# Patient Record
Sex: FEMALE | Race: WHITE | NOT HISPANIC OR LATINO | Employment: FULL TIME | URBAN - METROPOLITAN AREA
[De-identification: names, ages, dates, MRNs, and addresses within clinical notes are randomized per-mention and may not be internally consistent; named-entity substitution may affect disease eponyms.]

---

## 2023-02-11 PROBLEM — K21.9 REFLUX LARYNGITIS: Status: ACTIVE | Noted: 2023-02-11

## 2023-02-11 PROBLEM — R39.9 UTI SYMPTOMS: Status: ACTIVE | Noted: 2023-02-11

## 2023-02-11 PROBLEM — R92.1 CALCIFICATION OF LEFT BREAST ON MAMMOGRAPHY: Status: ACTIVE | Noted: 2023-02-11

## 2023-02-11 PROBLEM — N39.41 URGE INCONTINENCE: Status: ACTIVE | Noted: 2023-02-11

## 2023-02-11 PROBLEM — E55.9 VITAMIN D DEFICIENCY: Status: ACTIVE | Noted: 2023-02-11

## 2023-02-11 PROBLEM — R06.83 SNORING: Status: ACTIVE | Noted: 2023-02-11

## 2023-02-11 PROBLEM — I51.9 CARDIAC DISEASE: Status: ACTIVE | Noted: 2023-02-11

## 2023-02-11 PROBLEM — D22.9 SKIN MOLE: Status: ACTIVE | Noted: 2023-02-11

## 2023-02-11 PROBLEM — R93.1 ELEVATED CORONARY ARTERY CALCIUM SCORE: Status: ACTIVE | Noted: 2023-02-11

## 2023-02-11 PROBLEM — J34.89 NASAL DRYNESS: Status: ACTIVE | Noted: 2023-02-11

## 2023-02-11 PROBLEM — N20.0 RECURRENT NEPHROLITHIASIS: Status: ACTIVE | Noted: 2023-02-11

## 2023-02-11 PROBLEM — G47.33 OSA (OBSTRUCTIVE SLEEP APNEA): Status: ACTIVE | Noted: 2023-02-11

## 2023-02-11 PROBLEM — N64.4 BREAST PAIN: Status: ACTIVE | Noted: 2023-02-11

## 2023-02-11 PROBLEM — E66.811 CLASS 1 OBESITY DUE TO EXCESS CALORIES WITH BODY MASS INDEX (BMI) OF 31.0 TO 31.9 IN ADULT: Status: ACTIVE | Noted: 2023-02-11

## 2023-02-11 PROBLEM — N90.89 LABIAL LESION: Status: ACTIVE | Noted: 2023-02-11

## 2023-02-11 PROBLEM — E66.09 CLASS 1 OBESITY DUE TO EXCESS CALORIES WITH BODY MASS INDEX (BMI) OF 31.0 TO 31.9 IN ADULT: Status: ACTIVE | Noted: 2023-02-11

## 2023-02-11 PROBLEM — N64.89 BREAST HEMATOMA, RESOLVING: Status: ACTIVE | Noted: 2023-02-11

## 2023-02-11 PROBLEM — N95.2 ATROPHIC VAGINITIS: Status: ACTIVE | Noted: 2023-02-11

## 2023-02-11 PROBLEM — R92.8 ABNORMAL MAMMOGRAM OF BOTH BREASTS: Status: ACTIVE | Noted: 2023-02-11

## 2023-02-11 PROBLEM — J38.1 VOCAL CORD POLYP: Status: ACTIVE | Noted: 2023-02-11

## 2023-02-11 PROBLEM — R10.9 ABDOMINAL PAIN: Status: ACTIVE | Noted: 2023-02-11

## 2023-02-11 PROBLEM — E78.9 LIPID DISORDER: Status: ACTIVE | Noted: 2023-02-11

## 2023-02-11 PROBLEM — N81.89 PELVIC FLOOR WEAKNESS: Status: ACTIVE | Noted: 2023-02-11

## 2023-02-11 PROBLEM — R09.89 CHRONIC THROAT CLEARING: Status: ACTIVE | Noted: 2023-02-11

## 2023-02-11 PROBLEM — E66.9 OBESITY: Status: ACTIVE | Noted: 2023-02-11

## 2023-02-11 PROBLEM — E03.9 HYPOTHYROID: Status: ACTIVE | Noted: 2023-02-11

## 2023-02-11 PROBLEM — I42.2 ASYMMETRIC SEPTAL HYPERTROPHY (MULTI): Status: ACTIVE | Noted: 2023-02-11

## 2023-02-11 PROBLEM — E78.5 HYPERLIPIDEMIA: Status: ACTIVE | Noted: 2023-02-11

## 2023-02-11 PROBLEM — M54.59 MECHANICAL LOW BACK PAIN: Status: ACTIVE | Noted: 2023-02-11

## 2023-02-11 PROBLEM — I51.7 ASYMMETRIC SEPTAL HYPERTROPHY: Status: ACTIVE | Noted: 2023-02-11

## 2023-02-11 PROBLEM — M54.12 CERVICAL NEURITIS: Status: ACTIVE | Noted: 2023-02-11

## 2023-02-11 PROBLEM — R10.2 FEMALE PELVIC PAIN: Status: ACTIVE | Noted: 2023-02-11

## 2023-02-11 PROBLEM — R07.89 CHEST WALL PAIN: Status: ACTIVE | Noted: 2023-02-11

## 2023-02-11 PROBLEM — K76.9 LIVER LESION: Status: ACTIVE | Noted: 2023-02-11

## 2023-02-11 PROBLEM — F41.9 ANXIETY: Status: ACTIVE | Noted: 2023-02-11

## 2023-02-11 PROBLEM — N83.209 OVARIAN CYST: Status: ACTIVE | Noted: 2023-02-11

## 2023-02-11 PROBLEM — R35.0 URINARY FREQUENCY: Status: ACTIVE | Noted: 2023-02-11

## 2023-02-11 PROBLEM — R01.1 MURMUR: Status: ACTIVE | Noted: 2023-02-11

## 2023-02-11 PROBLEM — J04.0 REFLUX LARYNGITIS: Status: ACTIVE | Noted: 2023-02-11

## 2023-02-11 PROBLEM — N20.0 KIDNEY STONE: Status: ACTIVE | Noted: 2023-02-11

## 2023-02-11 RX ORDER — VIT C/E/ZN/COPPR/LUTEIN/ZEAXAN 250MG-90MG
CAPSULE ORAL
COMMUNITY

## 2023-02-11 RX ORDER — OXYBUTYNIN CHLORIDE 10 MG/1
1 TABLET, EXTENDED RELEASE ORAL DAILY
COMMUNITY
End: 2023-11-10

## 2023-02-11 RX ORDER — ROSUVASTATIN CALCIUM 20 MG/1
1 TABLET, COATED ORAL DAILY
COMMUNITY
Start: 2020-03-03 | End: 2024-02-02 | Stop reason: SDUPTHER

## 2023-02-11 RX ORDER — BUSPIRONE HYDROCHLORIDE 10 MG/1
2 TABLET ORAL 2 TIMES DAILY
COMMUNITY
Start: 2015-04-27 | End: 2024-02-02 | Stop reason: SDUPTHER

## 2023-02-11 RX ORDER — EZETIMIBE 10 MG/1
1 TABLET ORAL DAILY
COMMUNITY
Start: 2017-02-15 | End: 2024-02-05 | Stop reason: SDUPTHER

## 2023-02-11 RX ORDER — LEVOTHYROXINE SODIUM 88 UG/1
TABLET ORAL
COMMUNITY
Start: 2015-03-18 | End: 2023-05-26 | Stop reason: SDUPTHER

## 2023-02-11 RX ORDER — ASPIRIN 81 MG/1
1 TABLET ORAL DAILY
COMMUNITY
Start: 2015-03-18

## 2023-02-11 RX ORDER — ESTRADIOL 0.1 MG/G
CREAM VAGINAL
COMMUNITY
End: 2023-11-08 | Stop reason: ALTCHOICE

## 2023-02-11 RX ORDER — PHENTERMINE HYDROCHLORIDE 37.5 MG/1
1 CAPSULE ORAL
COMMUNITY
End: 2023-04-17 | Stop reason: SDUPTHER

## 2023-02-11 RX ORDER — OMEPRAZOLE 40 MG/1
1 CAPSULE, DELAYED RELEASE ORAL DAILY
COMMUNITY
Start: 2022-11-02 | End: 2023-11-08 | Stop reason: ALTCHOICE

## 2023-02-11 RX ORDER — FLUOXETINE HYDROCHLORIDE 20 MG/1
1 CAPSULE ORAL DAILY
COMMUNITY
Start: 2018-06-26 | End: 2024-02-02 | Stop reason: SDUPTHER

## 2023-03-07 LAB
ANION GAP IN SER/PLAS: 14 MMOL/L (ref 10–20)
CALCIUM (MG/DL) IN SER/PLAS: 9.8 MG/DL (ref 8.6–10.6)
CARBON DIOXIDE, TOTAL (MMOL/L) IN SER/PLAS: 26 MMOL/L (ref 21–32)
CHLORIDE (MMOL/L) IN SER/PLAS: 107 MMOL/L (ref 98–107)
CREATININE (MG/DL) IN SER/PLAS: 0.74 MG/DL (ref 0.5–1.05)
ERYTHROCYTE DISTRIBUTION WIDTH (RATIO) BY AUTOMATED COUNT: 12.1 % (ref 11.5–14.5)
ERYTHROCYTE MEAN CORPUSCULAR HEMOGLOBIN CONCENTRATION (G/DL) BY AUTOMATED: 32.9 G/DL (ref 32–36)
ERYTHROCYTE MEAN CORPUSCULAR VOLUME (FL) BY AUTOMATED COUNT: 92 FL (ref 80–100)
ERYTHROCYTES (10*6/UL) IN BLOOD BY AUTOMATED COUNT: 4.67 X10E12/L (ref 4–5.2)
GFR FEMALE: 89 ML/MIN/1.73M2
GLUCOSE (MG/DL) IN SER/PLAS: 80 MG/DL (ref 74–99)
HEMATOCRIT (%) IN BLOOD BY AUTOMATED COUNT: 42.8 % (ref 36–46)
HEMOGLOBIN (G/DL) IN BLOOD: 14.1 G/DL (ref 12–16)
INR IN PPP BY COAGULATION ASSAY: 1 (ref 0.9–1.1)
LEUKOCYTES (10*3/UL) IN BLOOD BY AUTOMATED COUNT: 7 X10E9/L (ref 4.4–11.3)
NRBC (PER 100 WBCS) BY AUTOMATED COUNT: 0.3 /100 WBC (ref 0–0)
PLATELETS (10*3/UL) IN BLOOD AUTOMATED COUNT: 261 X10E9/L (ref 150–450)
POTASSIUM (MMOL/L) IN SER/PLAS: 4.5 MMOL/L (ref 3.5–5.3)
PROTHROMBIN TIME (PT) IN PPP BY COAGULATION ASSAY: 11.2 SEC (ref 9.8–13.4)
SODIUM (MMOL/L) IN SER/PLAS: 142 MMOL/L (ref 136–145)
UREA NITROGEN (MG/DL) IN SER/PLAS: 17 MG/DL (ref 6–23)

## 2023-03-08 LAB — URINE CULTURE: NORMAL

## 2023-03-13 ENCOUNTER — OFFICE VISIT (OUTPATIENT)
Dept: PRIMARY CARE | Facility: CLINIC | Age: 68
End: 2023-03-13
Payer: COMMERCIAL

## 2023-03-13 VITALS
SYSTOLIC BLOOD PRESSURE: 124 MMHG | OXYGEN SATURATION: 98 % | HEART RATE: 108 BPM | BODY MASS INDEX: 31.71 KG/M2 | RESPIRATION RATE: 18 BRPM | WEIGHT: 157 LBS | DIASTOLIC BLOOD PRESSURE: 84 MMHG

## 2023-03-13 DIAGNOSIS — E78.2 MIXED HYPERLIPIDEMIA: ICD-10-CM

## 2023-03-13 DIAGNOSIS — E66.09 CLASS 1 OBESITY DUE TO EXCESS CALORIES WITH SERIOUS COMORBIDITY AND BODY MASS INDEX (BMI) OF 31.0 TO 31.9 IN ADULT: Primary | ICD-10-CM

## 2023-03-13 DIAGNOSIS — F41.9 ANXIETY: ICD-10-CM

## 2023-03-13 PROCEDURE — 3008F BODY MASS INDEX DOCD: CPT | Performed by: INTERNAL MEDICINE

## 2023-03-13 PROCEDURE — 99214 OFFICE O/P EST MOD 30 MIN: CPT | Performed by: INTERNAL MEDICINE

## 2023-03-13 ASSESSMENT — ENCOUNTER SYMPTOMS
EYES NEGATIVE: 1
PSYCHIATRIC NEGATIVE: 1
HEMATOLOGIC/LYMPHATIC NEGATIVE: 1
NEUROLOGICAL NEGATIVE: 1
MUSCULOSKELETAL NEGATIVE: 1
CONSTITUTIONAL NEGATIVE: 1
RESPIRATORY NEGATIVE: 1
GASTROINTESTINAL NEGATIVE: 1
CARDIOVASCULAR NEGATIVE: 1
ALLERGIC/IMMUNOLOGIC NEGATIVE: 1
ENDOCRINE NEGATIVE: 1

## 2023-03-13 NOTE — PROGRESS NOTES
Subjective   Patient ID: Susan Rowan is a 67 y.o. female who presents for Follow-up.    Elevated BMI-using Phentermine 37.5 mg     HPI     Elevated BMI    Kidney stones    Dx with OSWALDO recently    HLD    Hypothyroid              Review of Systems   Constitutional: Negative.    HENT: Negative.     Eyes: Negative.    Respiratory: Negative.     Cardiovascular: Negative.    Gastrointestinal: Negative.    Endocrine: Negative.    Genitourinary: Negative.    Musculoskeletal: Negative.    Skin: Negative.    Allergic/Immunologic: Negative.    Neurological: Negative.    Hematological: Negative.    Psychiatric/Behavioral: Negative.         Objective   Pulse 108   Resp 18   Wt 71.2 kg (157 lb)   SpO2 98%   BMI 31.71 kg/m²     Physical Exam  Constitutional:       Appearance: Normal appearance.   HENT:      Head: Normocephalic and atraumatic.      Right Ear: Tympanic membrane, ear canal and external ear normal.      Left Ear: Tympanic membrane, ear canal and external ear normal.      Nose: Nose normal.      Mouth/Throat:      Mouth: Mucous membranes are moist.   Eyes:      Extraocular Movements: Extraocular movements intact.      Conjunctiva/sclera: Conjunctivae normal.      Pupils: Pupils are equal, round, and reactive to light.   Cardiovascular:      Rate and Rhythm: Normal rate and regular rhythm.      Pulses: Normal pulses.      Heart sounds: Normal heart sounds.   Pulmonary:      Effort: Pulmonary effort is normal.      Breath sounds: Normal breath sounds.   Abdominal:      General: Abdomen is flat. Bowel sounds are normal.      Palpations: Abdomen is soft.   Musculoskeletal:         General: Normal range of motion.      Cervical back: Normal range of motion.   Skin:     General: Skin is warm.   Neurological:      General: No focal deficit present.      Mental Status: She is alert and oriented to person, place, and time.   Psychiatric:         Mood and Affect: Mood normal.         Behavior: Behavior normal.          Assessment/Plan        Elevated BMI-Continue with Phentermine 37.5 mg daily    Kidney stones    OSWALDO -follow up with sleep medicine    HLD    Hypothyroid    Continue with Current treatment    Follow up in 3 months/PRN

## 2023-04-06 LAB — POTASSIUM (MMOL/L) IN SER/PLAS: 4.4 MMOL/L (ref 3.5–5.3)

## 2023-04-12 ENCOUNTER — APPOINTMENT (OUTPATIENT)
Dept: PRIMARY CARE | Facility: CLINIC | Age: 68
End: 2023-04-12
Payer: COMMERCIAL

## 2023-04-17 ENCOUNTER — OFFICE VISIT (OUTPATIENT)
Dept: PRIMARY CARE | Facility: CLINIC | Age: 68
End: 2023-04-17
Payer: COMMERCIAL

## 2023-04-17 VITALS
RESPIRATION RATE: 20 BRPM | SYSTOLIC BLOOD PRESSURE: 129 MMHG | HEART RATE: 104 BPM | BODY MASS INDEX: 30.84 KG/M2 | WEIGHT: 153 LBS | HEIGHT: 59 IN | DIASTOLIC BLOOD PRESSURE: 87 MMHG

## 2023-04-17 DIAGNOSIS — E66.9 OBESITY (BMI 30-39.9): Primary | ICD-10-CM

## 2023-04-17 PROCEDURE — 99213 OFFICE O/P EST LOW 20 MIN: CPT | Performed by: INTERNAL MEDICINE

## 2023-04-17 PROCEDURE — 1036F TOBACCO NON-USER: CPT | Performed by: INTERNAL MEDICINE

## 2023-04-17 PROCEDURE — 3008F BODY MASS INDEX DOCD: CPT | Performed by: INTERNAL MEDICINE

## 2023-04-17 PROCEDURE — 1159F MED LIST DOCD IN RCRD: CPT | Performed by: INTERNAL MEDICINE

## 2023-04-17 RX ORDER — TAMSULOSIN HYDROCHLORIDE 0.4 MG/1
1 CAPSULE ORAL DAILY
COMMUNITY
Start: 2023-02-14 | End: 2023-11-08 | Stop reason: ALTCHOICE

## 2023-04-17 RX ORDER — PHENTERMINE HYDROCHLORIDE 37.5 MG/1
37.5 CAPSULE ORAL
Qty: 30 CAPSULE | Refills: 0 | Status: SHIPPED | OUTPATIENT
Start: 2023-04-17 | End: 2023-11-08 | Stop reason: ALTCHOICE

## 2023-04-17 NOTE — PROGRESS NOTES
Subjective   Patient ID: Susan Rowan is a 67 y.o. female who presents for No chief complaint on file..    HPI     Stage 1 Obesity    HTN    HLD    Hypothyroid        Review of Systems      No Fever/chills/headaches/dizziness/chest pains/ shortness of breath/palpitations/Nausea/vomiting/diarrhea/ constipation/urine frequency/blood in urine.      Objective   There were no vitals taken for this visit.    Physical Exam    No JVP elevation. No palpable Lymph Nodes. No Thyromegaly    CVS-NL S1/S2 . No MRG    Lungs-CTA. B/S= B/L    Abdomen-Soft, Non-tender. No masses or HSM    Extremities: No C/C/E      Assessment/Plan        Stage 1 Obesity- Continue with Adipex 37.5 mg daily x 1 month    HTN    HLD    Hypothyroid    OARRS revied    Continue with Current treatment    Follow up in 3 months/PRN

## 2023-05-19 ENCOUNTER — TELEPHONE (OUTPATIENT)
Dept: PRIMARY CARE | Facility: CLINIC | Age: 68
End: 2023-05-19
Payer: COMMERCIAL

## 2023-05-19 NOTE — TELEPHONE ENCOUNTER
PT called and stated that her RX went to the actual pharmacy and was supposed to go to [facility] on file can it get chaged please        RX: Fluoxetine 20 MG            [facility]

## 2023-05-19 NOTE — TELEPHONE ENCOUNTER
Called patient and informed her that [facility] actually sent a 90 day supply on 04/18/23 to [facility] mail service pharmacy. Patient stated that she will call the pharmacy and check what she has at home. Patient was in full understanding and had no other questions or concerns. Patient was identified with full name and date of birth.       Opal Hurtado MA

## 2023-05-26 DIAGNOSIS — E03.9 HYPOTHYROIDISM, UNSPECIFIED TYPE: ICD-10-CM

## 2023-05-26 RX ORDER — LEVOTHYROXINE SODIUM 88 UG/1
88 TABLET ORAL DAILY
Qty: 114 TABLET | Refills: 0 | Status: SHIPPED | OUTPATIENT
Start: 2023-05-26 | End: 2023-08-29 | Stop reason: SDUPTHER

## 2023-08-06 DIAGNOSIS — E03.9 HYPOTHYROIDISM, UNSPECIFIED TYPE: ICD-10-CM

## 2023-08-29 DIAGNOSIS — E03.9 HYPOTHYROIDISM, UNSPECIFIED TYPE: ICD-10-CM

## 2023-08-29 RX ORDER — LEVOTHYROXINE SODIUM 88 UG/1
88 TABLET ORAL DAILY
Qty: 90 TABLET | Refills: 3 | Status: SHIPPED | OUTPATIENT
Start: 2023-08-29 | End: 2024-02-02 | Stop reason: SDUPTHER

## 2023-08-29 RX ORDER — LEVOTHYROXINE SODIUM 88 UG/1
TABLET ORAL
Qty: 104 TABLET | Refills: 0 | OUTPATIENT
Start: 2023-08-29

## 2023-11-08 ENCOUNTER — OFFICE VISIT (OUTPATIENT)
Dept: SLEEP MEDICINE | Facility: CLINIC | Age: 68
End: 2023-11-08
Payer: COMMERCIAL

## 2023-11-08 VITALS
DIASTOLIC BLOOD PRESSURE: 82 MMHG | OXYGEN SATURATION: 95 % | WEIGHT: 159 LBS | SYSTOLIC BLOOD PRESSURE: 114 MMHG | BODY MASS INDEX: 32.11 KG/M2 | HEART RATE: 97 BPM

## 2023-11-08 DIAGNOSIS — G47.33 OSA (OBSTRUCTIVE SLEEP APNEA): Primary | ICD-10-CM

## 2023-11-08 PROCEDURE — 1159F MED LIST DOCD IN RCRD: CPT | Performed by: STUDENT IN AN ORGANIZED HEALTH CARE EDUCATION/TRAINING PROGRAM

## 2023-11-08 PROCEDURE — 1160F RVW MEDS BY RX/DR IN RCRD: CPT | Performed by: STUDENT IN AN ORGANIZED HEALTH CARE EDUCATION/TRAINING PROGRAM

## 2023-11-08 PROCEDURE — 1126F AMNT PAIN NOTED NONE PRSNT: CPT | Performed by: STUDENT IN AN ORGANIZED HEALTH CARE EDUCATION/TRAINING PROGRAM

## 2023-11-08 PROCEDURE — 99214 OFFICE O/P EST MOD 30 MIN: CPT | Performed by: STUDENT IN AN ORGANIZED HEALTH CARE EDUCATION/TRAINING PROGRAM

## 2023-11-08 PROCEDURE — 3008F BODY MASS INDEX DOCD: CPT | Performed by: STUDENT IN AN ORGANIZED HEALTH CARE EDUCATION/TRAINING PROGRAM

## 2023-11-08 PROCEDURE — 1036F TOBACCO NON-USER: CPT | Performed by: STUDENT IN AN ORGANIZED HEALTH CARE EDUCATION/TRAINING PROGRAM

## 2023-11-08 RX ORDER — BACLOFEN 10 MG/1
10 TABLET ORAL 3 TIMES DAILY
COMMUNITY

## 2023-11-08 RX ORDER — POTASSIUM CITRATE AND CITRIC ACID MONOHYDRATE 1100; 334 MG/5ML; MG/5ML
10 SOLUTION ORAL
COMMUNITY
End: 2024-04-25 | Stop reason: SDUPTHER

## 2023-11-08 ASSESSMENT — SLEEP AND FATIGUE QUESTIONNAIRES
ESS-CHAD TOTAL SCORE: 6
HOW LIKELY ARE YOU TO NOD OFF OR FALL ASLEEP WHILE SITTING QUIETLY AFTER LUNCH WITHOUT ALCOHOL: WOULD NEVER DOZE
SATISFACTION_WITH_CURRENT_SLEEP_PATTERN: SATISFIED
HOW LIKELY ARE YOU TO NOD OFF OR FALL ASLEEP WHEN YOU ARE A PASSENGER IN A CAR FOR AN HOUR WITHOUT A BREAK: HIGH CHANCE OF DOZING
WORRIED_DISTRESSED_DUE_TO_SLEEP: A LITTLE
HOW LIKELY ARE YOU TO NOD OFF OR FALL ASLEEP WHILE SITTING AND TALKING TO SOMEONE: WOULD NEVER DOZE
HOW LIKELY ARE YOU TO NOD OFF OR FALL ASLEEP WHILE SITTING AND READING: SLIGHT CHANCE OF DOZING
SLEEP_PROBLEM_INTERFERES_DAILY_ACTIVITIES: A LITTLE
SITING INACTIVE IN A PUBLIC PLACE LIKE A CLASS ROOM OR A MOVIE THEATER: SLIGHT CHANCE OF DOZING
SLEEP_PROBLEM_NOTICEABLE_TO_OTHERS: A LITTLE
HOW LIKELY ARE YOU TO NOD OFF OR FALL ASLEEP IN A CAR, WHILE STOPPED FOR A FEW MINUTES IN TRAFFIC: WOULD NEVER DOZE
HOW LIKELY ARE YOU TO NOD OFF OR FALL ASLEEP WHILE LYING DOWN TO REST IN THE AFTERNOON WHEN CIRCUMSTANCES PERMIT: WOULD NEVER DOZE
HOW LIKELY ARE YOU TO NOD OFF OR FALL ASLEEP WHILE WATCHING TV: SLIGHT CHANCE OF DOZING

## 2023-11-08 ASSESSMENT — LIFESTYLE VARIABLES
HOW OFTEN DO YOU HAVE SIX OR MORE DRINKS ON ONE OCCASION: NEVER
AUDIT-C TOTAL SCORE: 1
HOW OFTEN DO YOU HAVE A DRINK CONTAINING ALCOHOL: MONTHLY OR LESS
SKIP TO QUESTIONS 9-10: 1
HOW MANY STANDARD DRINKS CONTAINING ALCOHOL DO YOU HAVE ON A TYPICAL DAY: 1 OR 2

## 2023-11-08 ASSESSMENT — ENCOUNTER SYMPTOMS
PSYCHIATRIC NEGATIVE: 1
NEUROLOGICAL NEGATIVE: 1
CONSTITUTIONAL NEGATIVE: 1
CARDIOVASCULAR NEGATIVE: 1
RESPIRATORY NEGATIVE: 1

## 2023-11-08 ASSESSMENT — PATIENT HEALTH QUESTIONNAIRE - PHQ9
2. FEELING DOWN, DEPRESSED OR HOPELESS: NOT AT ALL
SUM OF ALL RESPONSES TO PHQ9 QUESTIONS 1 AND 2: 0
1. LITTLE INTEREST OR PLEASURE IN DOING THINGS: NOT AT ALL

## 2023-11-08 NOTE — PROGRESS NOTES
Patient: Susan Rowan    [id number]  : 1955 -- AGE 68 y.o.    Provider: Abhishek Araya MD     Location [facility]   Service Date: 2023              [facility]  Followup Visit Note    HISTORY OF PRESENT ILLNESS     HISTORY OF PRESENT ILLNESS   Susan Rowan is a 68 y.o. female with h/o OSWLADO and Obesity who presents to a [facility] for followup.     Assessment and plan from last visit: 23 w/ María Elena Flores    OBSTRUCTIVE SLEEP APNEA  -continue 4-12cwp with [facility]   -very close with compliance, 67% of nights hitting 4+ hours; using almost every single day with exception of night she had URI symptoms; sometimes takes off without recall but working to put back on if she notices this   -Sleep apnea and PAP therapy education was provided at length in clinic today.   -Diet, exercise, and weight loss were emphasized today in clinic, as were non-supine sleep, avoiding alcohol in the late evening, and driving or operating heavy machinery when sleepy.     *Reports daytime sleepiness; ESS today is 8  -mainly affects her when she has downtime at work in afternoon  -has begun to extend sleep time to 7-8 hours per night over past 1.5 weeks  -working to use pap longer periods of time/put back on if she takes off without recall; average use on days used is ~5 hours 20 minutes  -monitor; I do think with continued sleep extension/increased pap use she will likely see some improvement         OBESITY  -BMI: 32  -consider weight management referral at future visit     aware of my upcoming maternity leave; would like closer interval follow up in ~2-3 mo - provided number for scheduling, will see Dr. Araya or Cobos at [facility] during this time     Current History    On today's visit, the patient reports doing very well with her CPAP.  Haven't really felt that much subjective benefit, but snoring was definitely resolved.  Working on weight  loss    PAP Info  DURABLE MEDICAL EQUIPMENT COMPANY: [employer]  Machine: THERAPY: RESMED AIRSENSE 11 4 - 12 cm H2O  Issues with therapy: ISSUES WITH THERAPY: None  Benefits with PAP: PERCEIVED BENEFITS OF PAP: refreshing sleep decreased or no snoring    RLS Followup:   none.    Daytime Symptoms    Patient reports DAYTIME SYMPTOMS: no daytime symptoms  Patient denies daytime symptoms including: Denies: excessive daytime sleepiness sleep inertia    Naps: No  Fatigue: denies feeling fatigue    ESS: 6  JONATAN: 4    REVIEW OF SYSTEMS     REVIEW OF SYSTEMS  Review of Systems   Constitutional: Negative.    HENT: Negative.     Respiratory: Negative.     Cardiovascular: Negative.    Genitourinary: Negative.    Skin: Negative.    Neurological: Negative.    Psychiatric/Behavioral: Negative.       ALLERGIES AND MEDICATIONS     ALLERGIES  Allergies   Allergen Reactions    Vancomycin Rash    Cephalexin Unknown    Penicillins Hives    Tolterodine Unknown       MEDICATIONS: She has a current medication list which includes the following prescription(s): aspirin - Take 1 tablet (81 mg) by mouth once daily, baclofen - Take 1 tablet (10 mg) by mouth 3 times a day, buspirone - Take 2 tablets (20 mg) by mouth 2 times a day, cholecalciferol - Take by mouth, ezetimibe - Take 1 tablet (10 mg) by mouth once daily, fluoxetine - Take 1 capsule (20 mg) by mouth once daily, levothyroxine - Take 1 tablet (88 mcg) by mouth once daily. Take 1 tablet daily and double on sundays, oxybutynin xl - Take 1 tablet (10 mg) by mouth once daily. Do not crush, chew, or split, potassium citrate-citric acid - Take 5 mL (10 mEq) by mouth 3 times a day with meals, and rosuvastatin - Take 1 tablet (20 mg) by mouth once daily.    PAST MEDICAL HISTORY : She  has a past medical history of Encounter for full-term uncomplicated delivery, Encounter for screening for malignant neoplasm of colon (03/22/2017), Other conditions influencing health status  (05/25/2018), Other specified postprocedural states, Pain in unspecified foot (09/19/2017), Personal history of other diseases of the circulatory system (03/06/2020), Personal history of other diseases of the musculoskeletal system and connective tissue, Personal history of other diseases of the respiratory system (05/16/2018), Personal history of other diseases of the respiratory system (05/16/2018), Personal history of other specified conditions (06/13/2019), Personal history of urinary calculi, Pure hypercholesterolemia, unspecified, and Varicella without complication.    PAST SURGICAL HISTORY: She  has a past surgical history that includes Other surgical history (03/06/2020) and Other surgical history (03/22/2017).     FAMILY HISTORY: No changes since previous visit. Otherwise non-contributory as charted.     SOCIAL HISTORY  She  reports that she has never smoked. She has never used smokeless tobacco. She reports current alcohol use. She reports that she does not use drugs.       PHYSICAL EXAM     VITAL SIGNS: /82   Pulse 97   Wt 72.1 kg (159 lb)   SpO2 95%   BMI 32.11 kg/m²      PREVIOUS WEIGHTS:  Wt Readings from Last 3 Encounters:   11/08/23 72.1 kg (159 lb)   06/22/23 68.5 kg (151 lb)   04/20/23 69.4 kg (153 lb)         RESULTS/DATA     Bicarbonate (mmol/L)   Date Value   03/07/2023 26   11/25/2022 26   11/15/2022 27       PAP Adherence  A PAP adherence download was obtained and data was reviewed personally today in clinic.        ASSESSMENT/PLAN     Ms. Rowan is a 68 y.o. female and she returns in followup to the [facility] for OSWALDO.    Problem List, Orders, Assessment, Recommendations:  Problem List Items Addressed This Visit             ICD-10-CM    OSWALDO (obstructive sleep apnea) - Primary G47.33     - doing well with PAP therapy  - minor adjust for range to 7-14 cwp  - renew PAP supply orders           Relevant Orders    Positive Airway Pressure (PAP) Therapy     BMI 32.0-32.9,adult Z68.32     BMI Readings from Last 1 Encounters:   11/08/23 32.11 kg/m²   - encouraged healthy weight loss via diet and exercise  - consider referral to the weight loss program at follow-up visit              Disposition    Return to clinic in 4 months with María Elena Flores

## 2023-11-08 NOTE — PATIENT INSTRUCTIONS
[facility] Sleep Medicine  DO [address]  [address] [facility] [place] [address]       NAME: Susan Rowan   DATE: 11/08/23    Your Sleep Provider Today: Abhishek Araya MD  Your Primary Care Physician: Malik Draper MD   Your Referring Provider: No ref. provider found    DIAGNOSIS:   1. OSWALDO (obstructive sleep apnea)  Positive Airway Pressure (PAP) Therapy          Thank you for coming to the Sleep Medicine Clinic today! Your sleep medicine provider today was: Abhishek Araya MD Below is a summary of your treatment plan, other important information, and our contact numbers:      TREATMENT PLAN     - Follow-up in 3-4 months.  - If not already done, sign up for 'My Chart' and send prescription requests or messages through this      Obstructive sleep apnea (OSWALDO): OSWALDO is a sleep disorder where your upper airway muscles relax during sleep and the airway intermittently and repetitively narrows and collapses leading to blocked airway (apnea) which, in turn, can disrupt breathing in sleep, lower oxygen levels while you sleep and cause night time wakings. Because apnea may cause higher carbon dioxide or low oxygen levels, untreated OSWALDO can lead to heart arrhythmia, elevation of blood pressure, and make it harder for the body to consolidate memory and metabolize (leading to higher blood sugars at night).   Frequent partial arousals occur during sleep resulting in sleep deprivation and daytime sleepiness. OSWALDO is associated with an increased risk of cardiovascular disease, stroke, hypertension, and insulin resistance. Moreover, untreated OSWALDO with excessive daytime sleepiness can increase the risk of motor vehicular accidents.    Some conservative strategies for OSWALDO are:   Positional therapy - Avoid sleeping on your back.   Healthy diet, exercise, and optimizing weight encouraged.   Avoid alcohol late in the evening as it can make sleep apnea worse.     Safety: Avoid driving and  operating heavy equipment while sleepy. Drowsy driving may lead to life-threatening motor vehicle accidents.     Common treatment options for sleep apnea include weight management, positional therapy, Positive Airway Therapy (PAP) therapy, oral appliance therapy, hypoglossal nerve stimulation, and select airway surgeries.     Annual Reminders About Your Sleep Apnea    Your sleep apnea is well controlled based on reviewing your PAP Data Report.     General Reminders:  Continue current machine settings. Continue using machine every night. Need at least 4 hours daily usage.   Remember to clean your mask, tubings, and water chamber regularly as instructed.  Know the replacement schedule of your supplies/ accessories and contact your DME (durable medical equipment) provider if you are due for them.  Avoid driving or operating heavy machinery when drowsy. A person driving while sleepy is 5 times more likely to have an accident. If you feel sleepy, pull over and take a short power nap (sleep for less than 30 minutes). Otherwise, ask somebody to drive you.    Follow-up sooner through MyChart or calling our office if you have any of the following symptoms:  Snoring or stopping breathing while using the machine  Recurrence of fatigue, sleepiness, insomnia, and other symptoms you had prior using machine  Persistent or worsening fatigue or sleepiness despite regular use of machine  Issues tolerating the machine like bloating sensation, air hunger, too much hot air, too much pressure, taking off mask without recall in the middle of sleep, etc.     Other conservative measures to improve sleep apnea:  Losing weight can lead to some improvement of OSWALDO which means you will need lower pressures in machine to control your OSWALDO. In some patients, they don't need to use the machine after bariatric surgery. Hence, consider medical and/or surgical weight loss especially if your BMI is more than 35.  Avoiding alcohol, sedative-hypnotics,  and sedating medications is imperative as these substances can worsen snoring and sleep apnea  If you have nasal congestion or seasonal allergies, improving your breathing through the nose is critical for treating sleep apnea, tolerating CPAP, and improving your sleep; hence, using intranasal steroid spray like Flonase might help. Make sure you know the proper way to use it.  Stay off your back when sleeping.    Common issues with PAP machine:  Mask gets dislodged when turning to the side: Consider getting a CPAP pillow or switching to a mask with hose on top.     Dry mouth:  Your machine has built-in humidifier that heats up the air to prevent dry mouth. It can be adjusted to your comfort. You can try that first and increase setting one level one night at a time to check which setting is comfortable and effective in lessening dry mouth. If dry mouth persists despite humidity setting adjustment, may apply OTC Biotene gel over the gums at bedtime.  If Biotene gel is not effective, consider trying XEROSTOM gel from [employer].  Also, eliminate or reduce dose of meds that can cause dry mouth if possible. Lastly, may try getting a separate room humidifier machine.    Airleaks: Please call DME as they may need to adjust your mask or refit you with a different kind of mask. In addition, you can ask DME for tips on getting a good mask seal and mask fit.     Difficulty tolerating the mask: Contact your DME to try a different kind of mask and/or call office to get a referral to Sleep Psychologist for CPAP desensitization. CPAP desensitization technique is a set of strategies that helps patient cope with claustrophobia and anxiety related to wearing mask. Alternatively, we can do a daytime mini-sleep study called PAP-nap trial wherein you will try on different kinds of mask and the sleep technician will try different pressure settings on CPAP and BPAP machines to see which specific pressure is tolerable and comfortable for  "you.     Water droplets or moisture within the hose and/or mask: This is called rain-out and it is caused by condensation of too much heated humidity on the cooler walls of the hose. If you have rain-out, turn down humidity settings or get a heated hose. If you already have a heated hose, turn up the \"tube temperature\" of the heated hose. Alternatively, if you don't want to get a heated hose or warmer air, may wrap the CPAP hose with stockings to keep it somewhat warm. Also, you need to place the machine on the floor and lower the hose so that water won't travel upward towards your mask.     You can also go to the following EDUCATION WEBSITES for further information:   American Academy of Sleep Medicine http://sleepeducation.org  National Sleep Foundation: https://sleepfoundation.org  American Sleep Apnea Association: https://www.sleepapnea.org (for patients with sleep apnea)    Here at [facility], we wish you a restful sleep!     Instructions - Common OSWALDO Recs: - For your sleep apnea, continue to use your PAP every night and use it whenever you are sleeping.   - Avoid alcohol or sedatives several hours prior to sleeping.   - Get additional supplies for your PAP (e.g., mask, hose, filters) every 3 months or as your insurance allows from your [employer] company. Replacement cushions for your PAP mask can be requested monthly if airseals are an issue.  - Remember to clean your mask, tubings, and water chamber regularly as instructed.  - Avoid driving or operating heavy machinery when drowsy. A person driving while sleepy is five (5) times more likely to have an accident. If you feel sleepy, pull over and take a short power nap (sleep for less than 30 minutes). Otherwise, ask somebody to drive you.    Follow-up Appointment:   March 4 @ 4:00 pm with María Elena Flores    IMPORTANT INFORMATION     Call 911 for medical emergencies.  Our offices are generally open from Monday-Friday, 9 am - 5 pm.  If you need to get in touch " with me, you may either call me and my team(number is below) or you can use [employer].  If a referral for a test, for CPAP, or for another specialist was made, and you have not heard about scheduling this within a week, please call scheduling at [phone] ([phone]).  If you are unable to make your appointment for clinic or an overnight study, kindly call the office at least 48 hours in advance to cancel and reschedule.  If you are on CPAP, please bring your device's card or the device to each clinic appointment.   There are no supporting services by either the sleep doctors or their staff on weekends and Holidays, or after 5 PM on weekdays.   If you have been asked to come to a sleep study, make sure you bring toiletries, a comfy pillow, and any nighttime medications that you may regularly take. Also be sure to eat dinner before you arrive. We generally do not provide meals.      PRESCRIPTIONS     We require 7 days advanced notice for prescription refills. If we do not receive the request in this time, we cannot guarantee that your medication will be refilled in time.      IMPORTANT PHONE NUMBERS     Sleep Medicine Clinic Fax: [phone]  Appointments (for Adult Sleep Clinic): [phone] - option 2  Appointments (For Sleep Studies): [phone] - option 3  Behavioral Sleep Medicine: [phone]  Sleep Surgery: [phone]  ENT (Otolaryngology): [phone]  Headache Clinic (Neurology): [phone]  Neurology: [phone]  Psychiatry: [phone]  Pulmonary Function Testing (PFT) Center: [phone]  Pulmonary Medicine: [phone]  [employer] (DME): [phone]  [employer] (DME): [phone]  [facility] ([facility]): [phone]      OUR ADULT SLEEP MEDICINE TEAM   Please do not hesitate to call the office or sleep nurse with any questions between appointments:    Adult Sleep Nurses (Jimena Alejandre RN and Sonam Thurman RN):  For clinical questions and refilling  prescriptions: [phone]  Email sleep diaries and other documents at: [email or web address]    Adult Sleep Medicine Secretaries:  Carisa Mcguire (For [place]):   P: [phone]  F: [phone]  Yessica Barron (For Cobos/[place]): P: [phone]  Fax: [phone]  Xuan Young (For [place]cevic/Blank): P: [phone]  F: [phone]  Kari Grossman (For [place]): P: [phone]  F: [phone]  Aida Perkins (For Ana Maria/Ruy/Zakhary): P: [phone]  F: [phone]  Staci Dalton (For Kalin/Ramana): P: [phone]  F: [phone]     Adult Sleep Medicine Advanced Practice Providers:  Derrek Slater ([place], [place])  Jerrica Redmond ([facility])  Natalie Colon CNP ([place], [place], [place])  Audrey Flores CNP ([place], [place], [place])  Armida Bonilla ([place], [place], [place])  Amy Boles CNP ([facility])        OUR SLEEP TESTING LOCATIONS     Our team will contact you to schedule your sleep study, however, you can contact us as follow:  Main Phone Line (scheduling only): [phone]), option 3  Adult and Pediatric Locations  [facility] (6 years and older): [facility] - 4th floor ([address] [place] [address]) After hours line: [phone]  [facility] (Main campus: All ages): [facility], 6th floor. After hours line: [phone]  [facility] (5 years and older; younger considered on case-by-case basis): [facility]; [address], [address]. Scheduling  After hours line: [phone]  [facility] (6 years and older): [address]; [address]; [address]   [place] (6 years and older): [address], [address]  After hours line: [phone]   [place] (13 years and older) in [place]: [address], 2nd floor  After hours line: [phone]  [facility] (13 year and older): [address]   "After hours line: [phone]     Adult Only Locations:   [place] (18 years and older): [address] [place] [address], 2nd floor   [place] (18 years and older): [address]; 4th floor  After hours line: [phone]   [place] (18 years and older) at [place]: [id number] [place] [address]  After hours line: [phone]          CONTACTING YOUR SLEEP MEDICINE PROVIDER     Send a message directly to your provider through \"My Chart\", which is the email service through your  Records Account: [email or web address]   Call [phone] and leave a message. One of the administrative assistants will forward the message to your sleep medicine provider through \"My Chart\" and/or email.     Your sleep medicine provider for this visit was: Abhishek Araya MD      "

## 2023-11-09 ENCOUNTER — APPOINTMENT (OUTPATIENT)
Dept: UROLOGY | Facility: CLINIC | Age: 68
End: 2023-11-09
Payer: COMMERCIAL

## 2023-11-09 NOTE — ASSESSMENT & PLAN NOTE
BMI Readings from Last 1 Encounters:   11/08/23 32.11 kg/m²     - encouraged healthy weight loss via diet and exercise  - consider referral to the weight loss program at follow-up visit

## 2023-11-10 ENCOUNTER — TELEMEDICINE (OUTPATIENT)
Dept: UROLOGY | Facility: CLINIC | Age: 68
End: 2023-11-10
Payer: COMMERCIAL

## 2023-11-10 DIAGNOSIS — N39.41 URGE URINARY INCONTINENCE: Primary | ICD-10-CM

## 2023-11-10 DIAGNOSIS — R10.2 FEMALE PELVIC PAIN: ICD-10-CM

## 2023-11-10 DIAGNOSIS — N81.89 PELVIC FLOOR WEAKNESS: ICD-10-CM

## 2023-11-10 DIAGNOSIS — N95.2 ATROPHIC VAGINITIS: ICD-10-CM

## 2023-11-10 PROCEDURE — 99442 PR PHYS/QHP TELEPHONE EVALUATION 11-20 MIN: CPT | Performed by: OBSTETRICS & GYNECOLOGY

## 2023-11-10 RX ORDER — SOLIFENACIN SUCCINATE 10 MG/1
10 TABLET, FILM COATED ORAL DAILY
Qty: 90 TABLET | Refills: 3 | Status: SHIPPED | OUTPATIENT
Start: 2023-11-10 | End: 2024-01-11 | Stop reason: ALTCHOICE

## 2023-11-10 NOTE — PROGRESS NOTES
"Subjective   Patient ID: Susan Rowan is a 68 y.o. female who presents to discuss her lower urinary tract symptoms.     HPI  This visit was performed through telemedicine   68-year-old with urge urinary incontinence, history of recurrent nephrolithiasis, vaginal atrophy, and pelvic floor weakness.     The patient has been utilizing the Trospium 60 mg daily therapy with excellent results. She notes 0-1 episodes of nocturia but denies any enuresis. She voids every 3-4  hour during the day. However she notes incontinence on working out or waiting too long. She was taking the medication at night and did note dry mouth complaints but this was not bothersome for her. She denies any UTI like symptoms.     She denies any stress urinary incontinence but feels that her leakage is associated with \"waiting too long\".     She denies any vaginal complaints, no abnormal vaginal bleeding or discharge. She is utilizing the vaginal estrogen cream.      She denies any bowel related complaints, no fecal or flatal incontinence.     She has no other complaints.    From Previous note  This visit was performed through telemedicine   68-year-old with urge urinary incontinence, history of recurrent nephrolithiasis, vaginal atrophy, and pelvic floor weakness     The patient was utilizing the Trospium therapy with excellent results. She ran out of it hence noting recurrence of her urgency and incontinence symptoms. She was taking the medication at night and did note dry mouth complaints but this was not bothersome for her as she is utilizing the CPAP at night for sleep apnea. It appears that Myrbetriq and Gemtesa are cost prohibitive. She denies any UTI like symptoms.      She denies any vaginal complaints, no abnormal vaginal bleeding or discharge. She is utilizing the vaginal estrogen cream.      She denies any bowel related complaints, no fecal or flatal incontinence.     She has no other complaints.     From previous note   This visit was " performed through telemedicine  67-year-old with urge urinary incontinence, history of recurrent nephrolithiasis, vaginal atrophy, and pelvic floor weakness.     The patient patient appears to be having excellent results with the Trospium, however she is having terrible dry mouth complaints. She unable to manage these dry mouth complaints with the lozenges and Biotene. Trial of Myrbetriq OR Gemtesa were discussed at length. Other third line options including intradetrusor Botox, InterStim and PTNS were discussed at length.      She denies any vaginal complaints, no abnormal vaginal bleeding or discharge. She is utilizing the vaginal estrogen cream.      She denies any bowel related complaints, no fecal or flatal incontinence.     She has no other complaints.     From previous note  This visit was performed through telemedicine  67-year-old with urge urinary incontinence, history of recurrent nephrolithiasis, vaginal atrophy, and pelvic floor weakness.     The patient patient appears to be having excellent results with the Trospium, however she is having dry mouth complaints. She is using lozenges and other Biotene products to manage the dry mouth. However she is wishes to continue with this medication. She denies any UTI like symptoms.      She denies any vaginal complaints, no abnormal vaginal bleeding or discharge. She is utilizing the vaginal estrogen cream.      She denies any bowel related complaints, no fecal or flatal incontinence.     She has no other complaints.     From previous note  This visit was performed through telemedicine  67-year-old with urge urinary incontinence, history of recurrent nephrolithiasis, vaginal atrophy, and pelvic floor weakness.     The patient appears to be having excellent results with the Oxybutynin but she is having dry mouth complaints with it. She has not stated vaginal estrogen cream and wishes to do it at her earliest connivence. .She denies any UTI like symptoms today.      She denies any bowel related complaints, no fecal or flatal incontinence.     She denies any vaginal complaints, no abnormal vaginal bleeding or discharge.     She has no other complaints.     From previous note  67-year-old presenting as a self referral with complaints urinary urgency, frequency and kidney stones     The patient complaints of urinary urgency and frequency. She voids every 2 hours during the day and notices 2-3 episodes of nocturia but denies enuresis. She leaks on a full bladder and can make it tot he bathroom with difficulty. She leaks on laughing, coughing and sneezing. She has a history of kidney stone and she passed it once and has gross hematuria and is seeing an outside provider but needs a referral . She states she had a UTI like symptoms when she presented tot  emergency room in November.     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      She denies any bowel related complaints, no fecal or flatal incontinence.      She is sexually active and denies any dyspareunia, she denies any vaginal complaints, no abnormal vaginal bleeding or discharge.     She has no other complaints.               Review of Systems  Constitutional: No fever, No chills and No fatigue.   Eyes: No vision problems and No dryness of the eyes.   ENT: No dry mouth, No hearing loss and No nosebleeds.   Cardiovascular: No chest pain, No palpitations and No orthopnea.   Respiratory: No shortness of breath, No cough and No wheezing.   Gastrointestinal: No abdominal pain, No constipation, No nausea, No diarrhea, No vomiting and No melena.   Genitourinary: As noted in HPI.   Musculoskeletal: No back pain, No myalgias, No muscle weakness, No joint swelling and No leg edema.   Integumentary: No rashes, No skin lesion and No itching.   Neurological: No headache, No numbness and No dizziness.   Psychiatric: No sleep disturbances, No anxiety and No depression.   Endocrine: No hot flashes, No loss of hair and  No hirsutism.   Hematologic/Lymphatic: No swollen glands, No tendency for easy bleeding and No tendency for easy bruising.   All other systems have been reviewed and are negative for complaint.        Objective   Physical Exam  This visit was performed through telemedicine    Assessment/Plan      68-year-old with urge urinary incontinence, history of recurrent nephrolithiasis, vaginal atrophy, and pelvic floor weakness.     #1 we again discussed the patient's urge urinary incontinence. We discussed the [facility] care pathway including first, second, third line therapies. She was having excellent results with her oxybutynin but unfortunately noted significantly bothersome dry mouth complaints. She has utilized her trospium therapy with excellent results but does continue to note episodic leakage. She initially noted bothersome dry mouth complaints but this improved with taking the medication before bed. We did discuss proceeding with sugarless lozenges, sugarless gum, or Biotene therapy to help with her dry mouth complaints. Both Gemtesa and Myrbetriq are cost prohibitive.  We will see if there are tier exemption's or other alternatives to attempting dual therapy.  We discussed utilizing Solifenacin 10 mg now and should she have any worsening effects stopping it immediately.  We again discussed her third line options including PTNS, Botox, and InterStim. \She wishes to exhaust all oral therapies before proceeding in this direction.     #2 we discussed the patient's recurrent nephrolithiasis complaints. We discussed the importance of establishing care with one of our stone specialists. She is following up with Dr. Velasquez for stone related concerns.     3. We again discussed the patient's vaginal atrophy and how this affects her lower urinary tract complaints. She will continue her vaginal estrogen therapy 3 times a week moving forward     4. The patient will follow-up in 8 weeks to discuss her lower urinary tract  carrie.     MARK Grey MD     Scribe Attestation  By signing my name below, I, Elva Aria Roach attest that this documentation has been prepared under the direction and in the presence of Denis Grey MD. All medical record entries made by the Scribe were at my direction or personally dictated by me. I have reviewed the chart and agree that the record accurately reflects my personal performance of the history, physical exam, discussion and plan.

## 2023-12-04 ENCOUNTER — TELEPHONE (OUTPATIENT)
Dept: SLEEP MEDICINE | Facility: HOSPITAL | Age: 68
End: 2023-12-04
Payer: COMMERCIAL

## 2023-12-04 NOTE — TELEPHONE ENCOUNTER
Patient called to let Dr. Araya know that she has been having difficulty tolerating the increase in pressure setting on her PAP machine that was changed during her appointment on 11/08/2023. Patient states that she would like to have the pressures decreased so she is able to tolerate the PAP therapy better.    Message sent to Dr. Araya.

## 2023-12-07 ENCOUNTER — TELEMEDICINE (OUTPATIENT)
Dept: OTOLARYNGOLOGY | Facility: CLINIC | Age: 68
End: 2023-12-07
Payer: COMMERCIAL

## 2023-12-07 DIAGNOSIS — R05.3 CHRONIC COUGH: Primary | ICD-10-CM

## 2023-12-07 DIAGNOSIS — R09.89 CHRONIC THROAT CLEARING: ICD-10-CM

## 2023-12-07 PROCEDURE — 99213 OFFICE O/P EST LOW 20 MIN: CPT | Performed by: OTOLARYNGOLOGY

## 2023-12-07 NOTE — PROGRESS NOTES
ASSESSMENT AND PLAN:   Susan Rowan is a 68 y.o. female with a history of chronic cough on baclofen 10 mg TID who is doing well with this dose. The cough is all but gone. She has urgency of cough in the middle of the day. We discussed adding an additional 5 mg in the afternoon. We discussed annual visit in person, but she may follow up for refill of medications as needed.          Reason For Consult  No chief complaint on file.       HISTORY OF PRESENT ILLNESS:  Susan Rowan is a 68 y.o. female presenting for a follow up visit with me for chronic throat clearing.      The patient reports she uses gaviscon when she has abdominal pain or heartburn. This improves her symptom.         Prior History:   Last seen on 06/22/2023  a history of chronic throat clearing that has been ongoing for decades. She was doing well on Gaviscon and Baclofen. She is doing the speech strategies. We placed a refill of Baclofen and she will follow up as needed. She will increase her Gaviscon use as she is wishing to reduce her PPI. She  will stop PPI for now. She will stop Flonase and add nasal saline. She has a right TVC weakness.      Past Medical History  She has a past medical history of Encounter for full-term uncomplicated delivery, Encounter for screening for malignant neoplasm of colon (03/22/2017), Other conditions influencing health status (05/25/2018), Other specified postprocedural states, Pain in unspecified foot (09/19/2017), Personal history of other diseases of the circulatory system (03/06/2020), Personal history of other diseases of the musculoskeletal system and connective tissue, Personal history of other diseases of the respiratory system (05/16/2018), Personal history of other diseases of the respiratory system (05/16/2018), Personal history of other specified conditions (06/13/2019), Personal history of urinary calculi, Pure hypercholesterolemia, unspecified, and Varicella without complication. Surgical History  She  has a past surgical history that includes Other surgical history (03/06/2020) and Other surgical history (03/22/2017).   Social History  She reports that she has never smoked. She has never used smokeless tobacco. She reports current alcohol use. She reports that she does not use drugs. Allergies  Vancomycin, Cephalexin, Penicillins, and Tolterodine     Family History  Family History   Problem Relation Name Age of Onset    Arthritis Mother      Other (CARDIAC DISORDER) Mother      Other (CVA) Mother      Hyperlipidemia Mother      Osteoporosis Mother      Other (HOCM) Mother      Arthritis Father      Other (CARDIAC DISORDER) Father      Other (CEREBROVASCULAR ACCIDENT) Father      Hyperlipidemia Father      Other (MI) Father      Stroke Father      Hyperlipidemia Sister      Hyperlipidemia Brother      Anxiety disorder Other MAT GM     Depression Other MAT GM     Breast cancer Other MAT AUNT     Other (UTERUS CANCER) Other MAT AUNT        Review of Systems  All 10 systems were reviewed and negative except for above.      Last Recorded Vitals  There were no vitals taken for this visit.    Physical Exam  ENT Physical Exam  Constitutional  Appearance: patient appears well-developed and well-nourished,  Head and Face  Appearance: head appears normal and face appears normal;  Ear  Auricles: right auricle normal; left auricle normal;  Nose  External Nose: nares patent bilaterally;  Oral Cavity/Oropharynx  Lips: normal;  Neck  Neck: neck normal; neck palpation normal;  Respiratory  Inspection: no retractions visible;  Cardiovascular  Inspection: no peripheral edema present;  Neurovestibular  Mental Status: alert and oriented;  Psychiatric: mood normal;  Cranial Nerves: cranial nerves intact;        Time Spent  Prep time on day of patient encounter: 10 minutes  Time spent directly with patient, family or caregiver: 15 minutes  Additional Time Spent on Patient Care Activities: 5 minutes  Documentation Time: 10  minutes  Other Time Spent: 0 minutes  Total: 40 minutes       Scribe Attestation  By signing my name below, I, Sofia Colon , Scribbrendon attest that this documentation has been prepared under the direction and in the presence of Boyd Do MD.

## 2023-12-14 NOTE — TELEPHONE ENCOUNTER
Spoke with patient to follow-up on how she has done since Dr. Araya adjusted her pressures back down, patient states she is doing much better on the original pressure. Patient has a follow up visit scheduled with María Elena Flores PA-C on 3/4/2024.

## 2023-12-27 ENCOUNTER — TELEPHONE (OUTPATIENT)
Dept: UROLOGY | Facility: CLINIC | Age: 68
End: 2023-12-27
Payer: COMMERCIAL

## 2024-01-11 ENCOUNTER — OFFICE VISIT (OUTPATIENT)
Dept: UROLOGY | Facility: CLINIC | Age: 69
End: 2024-01-11
Payer: COMMERCIAL

## 2024-01-11 VITALS
WEIGHT: 158.3 LBS | SYSTOLIC BLOOD PRESSURE: 123 MMHG | BODY MASS INDEX: 31.97 KG/M2 | HEART RATE: 78 BPM | DIASTOLIC BLOOD PRESSURE: 86 MMHG

## 2024-01-11 DIAGNOSIS — N95.2 ATROPHIC VAGINITIS: ICD-10-CM

## 2024-01-11 DIAGNOSIS — R10.2 FEMALE PELVIC PAIN: ICD-10-CM

## 2024-01-11 DIAGNOSIS — N81.89 PELVIC FLOOR WEAKNESS: ICD-10-CM

## 2024-01-11 DIAGNOSIS — R35.0 URINARY FREQUENCY: ICD-10-CM

## 2024-01-11 DIAGNOSIS — N39.41 URGE URINARY INCONTINENCE: Primary | ICD-10-CM

## 2024-01-11 LAB
POC APPEARANCE, URINE: CLEAR
POC BILIRUBIN, URINE: NEGATIVE
POC BLOOD, URINE: NEGATIVE
POC COLOR, URINE: YELLOW
POC GLUCOSE, URINE: NEGATIVE MG/DL
POC KETONES, URINE: NEGATIVE MG/DL
POC LEUKOCYTES, URINE: ABNORMAL
POC NITRITE,URINE: NEGATIVE
POC PH, URINE: 7.5 PH
POC PROTEIN, URINE: NEGATIVE MG/DL
POC SPECIFIC GRAVITY, URINE: 1.01
POC UROBILINOGEN, URINE: 1 EU/DL

## 2024-01-11 PROCEDURE — 1036F TOBACCO NON-USER: CPT | Performed by: OBSTETRICS & GYNECOLOGY

## 2024-01-11 PROCEDURE — 52287 CYSTOSCOPY CHEMODENERVATION: CPT | Performed by: OBSTETRICS & GYNECOLOGY

## 2024-01-11 PROCEDURE — 2500000004 HC RX 250 GENERAL PHARMACY W/ HCPCS (ALT 636 FOR OP/ED): Performed by: OBSTETRICS & GYNECOLOGY

## 2024-01-11 PROCEDURE — 1160F RVW MEDS BY RX/DR IN RCRD: CPT | Performed by: OBSTETRICS & GYNECOLOGY

## 2024-01-11 PROCEDURE — 3008F BODY MASS INDEX DOCD: CPT | Performed by: OBSTETRICS & GYNECOLOGY

## 2024-01-11 PROCEDURE — 1126F AMNT PAIN NOTED NONE PRSNT: CPT | Performed by: OBSTETRICS & GYNECOLOGY

## 2024-01-11 PROCEDURE — 99213 OFFICE O/P EST LOW 20 MIN: CPT | Performed by: OBSTETRICS & GYNECOLOGY

## 2024-01-11 PROCEDURE — 96372 THER/PROPH/DIAG INJ SC/IM: CPT | Mod: 59 | Performed by: OBSTETRICS & GYNECOLOGY

## 2024-01-11 PROCEDURE — 81003 URINALYSIS AUTO W/O SCOPE: CPT | Performed by: OBSTETRICS & GYNECOLOGY

## 2024-01-11 RX ORDER — NITROFURANTOIN 25; 75 MG/1; MG/1
100 CAPSULE ORAL ONCE
Status: COMPLETED | OUTPATIENT
Start: 2024-01-11 | End: 2024-01-11

## 2024-01-11 RX ORDER — NITROFURANTOIN 25; 75 MG/1; MG/1
100 CAPSULE ORAL 2 TIMES DAILY
Qty: 6 CAPSULE | Refills: 0 | Status: SHIPPED | OUTPATIENT
Start: 2024-01-11 | End: 2024-01-14

## 2024-01-11 RX ADMIN — ONABOTULINUMTOXINA 100 UNITS: 100 INJECTION, POWDER, LYOPHILIZED, FOR SOLUTION INTRAMUSCULAR at 12:37

## 2024-01-11 RX ADMIN — NITROFURANTOIN (MONOHYDRATE/MACROCRYSTALS) 100 MG: 75; 25 CAPSULE ORAL at 12:38

## 2024-01-11 NOTE — PATIENT INSTRUCTIONS
Please follow-up in 2 weeks to discuss her lower urinary tract symptoms.    Please  your 3-day course of antibiotic on your way home.    Please contact the clinic with any questions or concerns.    [phone]

## 2024-01-11 NOTE — PROGRESS NOTES
"Subjective   Patient ID: Susan Rowan is a 68 y.o. female who presents for 100 units of intradetrusor Botox.   HPI   68-year-old with urge urinary incontinence, history of recurrent nephrolithiasis, vaginal atrophy, and pelvic floor weakness having undergone 100 units of Botox today 01/11/2024.    The patient has been utilizing Solifenacin 10 mg, she was instructed to stop this medication. She denies any UTI like symptoms.     She denies any vaginal complaints, no abnormal bleeding or discharge.     She denies any bowel related complaints, no fecal or flatal incontinence.     She has no other complaints.     From Previous note  This visit was performed through telemedicine   68-year-old with urge urinary incontinence, history of recurrent nephrolithiasis, vaginal atrophy, and pelvic floor weakness.     The patient has been utilizing the Trospium 60 mg daily therapy with excellent results. She notes 0-1 episodes of nocturia but denies any enuresis. She voids every 3-4  hour during the day. However she notes incontinence on working out or waiting too long. She was taking the medication at night and did note dry mouth complaints but this was not bothersome for her. She denies any UTI like symptoms.     She denies any stress urinary incontinence but feels that her leakage is associated with \"waiting too long\".     She denies any vaginal complaints, no abnormal vaginal bleeding or discharge. She is utilizing the vaginal estrogen cream.      She denies any bowel related complaints, no fecal or flatal incontinence.     She has no other complaints.    From Previous note  This visit was performed through telemedicine   68-year-old with urge urinary incontinence, history of recurrent nephrolithiasis, vaginal atrophy, and pelvic floor weakness     The patient was utilizing the Trospium therapy with excellent results. She ran out of it hence noting recurrence of her urgency and incontinence symptoms. She was taking the " medication at night and did note dry mouth complaints but this was not bothersome for her as she is utilizing the CPAP at night for sleep apnea. It appears that Myrbetriq and Gemtesa are cost prohibitive. She denies any UTI like symptoms.      She denies any vaginal complaints, no abnormal vaginal bleeding or discharge. She is utilizing the vaginal estrogen cream.      She denies any bowel related complaints, no fecal or flatal incontinence.     She has no other complaints.     From previous note   This visit was performed through telemedicine  67-year-old with urge urinary incontinence, history of recurrent nephrolithiasis, vaginal atrophy, and pelvic floor weakness.     The patient patient appears to be having excellent results with the Trospium, however she is having terrible dry mouth complaints. She unable to manage these dry mouth complaints with the lozenges and Biotene. Trial of Myrbetriq OR Gemtesa were discussed at length. Other third line options including intradetrusor Botox, InterStim and PTNS were discussed at length.      She denies any vaginal complaints, no abnormal vaginal bleeding or discharge. She is utilizing the vaginal estrogen cream.      She denies any bowel related complaints, no fecal or flatal incontinence.     She has no other complaints.     From previous note  This visit was performed through telemedicine  67-year-old with urge urinary incontinence, history of recurrent nephrolithiasis, vaginal atrophy, and pelvic floor weakness.     The patient patient appears to be having excellent results with the Trospium, however she is having dry mouth complaints. She is using lozenges and other Biotene products to manage the dry mouth. However she is wishes to continue with this medication. She denies any UTI like symptoms.      She denies any vaginal complaints, no abnormal vaginal bleeding or discharge. She is utilizing the vaginal estrogen cream.      She denies any bowel related complaints,  no fecal or flatal incontinence.     She has no other complaints.     From previous note  This visit was performed through telemedicine  67-year-old with urge urinary incontinence, history of recurrent nephrolithiasis, vaginal atrophy, and pelvic floor weakness.     The patient appears to be having excellent results with the Oxybutynin but she is having dry mouth complaints with it. She has not stated vaginal estrogen cream and wishes to do it at her earliest connivence. .She denies any UTI like symptoms today.     She denies any bowel related complaints, no fecal or flatal incontinence.     She denies any vaginal complaints, no abnormal vaginal bleeding or discharge.     She has no other complaints.     From previous note  67-year-old presenting as a self referral with complaints urinary urgency, frequency and kidney stones     The patient complaints of urinary urgency and frequency. She voids every 2 hours during the day and notices 2-3 episodes of nocturia but denies enuresis. She leaks on a full bladder and can make it tot he bathroom with difficulty. She leaks on laughing, coughing and sneezing. She has a history of kidney stone and she passed it once and has gross hematuria and is seeing an outside provider but needs a referral . She states she had a UTI like symptoms when she presented tot  emergency room in November.     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      She denies any bowel related complaints, no fecal or flatal incontinence.      She is sexually active and denies any dyspareunia, she denies any vaginal complaints, no abnormal vaginal bleeding or discharge.     She has no other complaints.               Review of Systems  Constitutional: No fever, No chills and No fatigue.   Eyes: No vision problems and No dryness of the eyes.   ENT: No dry mouth, No hearing loss and No nosebleeds.   Cardiovascular: No chest pain, No palpitations and No orthopnea.   Respiratory: No  shortness of breath, No cough and No wheezing.   Gastrointestinal: No abdominal pain, No constipation, No nausea, No diarrhea, No vomiting and No melena.   Genitourinary: As noted in HPI.   Musculoskeletal: No back pain, No myalgias, No muscle weakness, No joint swelling and No leg edema.   Integumentary: No rashes, No skin lesion and No itching.   Neurological: No headache, No numbness and No dizziness.   Psychiatric: No sleep disturbances, No anxiety and No depression.   Endocrine: No hot flashes, No loss of hair and No hirsutism.   Hematologic/Lymphatic: No swollen glands, No tendency for easy bleeding and No tendency for easy bruising.   All other systems have been reviewed and are negative for complaint.        Objective   Physical Exam    PHYSICAL EXAMINATION:  No LMP recorded.  Body mass index is 31.97 kg/m².  /86   Pulse 78   Wt 71.8 kg (158 lb 4.8 oz)   BMI 31.97 kg/m²   General Appearance: well appearing  Neuro: Alert and oriented   HEENT: mucous membranes moist, neck supple  Resp: No respiratory distress, normal work of breathing  MSK: normal range of motion, gait appropriate    After the patient was identified and consent was electronically signed, the patient was prepped with lidocaine and iodine.  A flexible cystoscope was then introduced into the bladder noting normal-appearing bladder mucosa and ureteral orifices in the normal orthotopic position.  Using an Olympus needle to a depth of 4 mm, 5 locations were injected across the posterior bladder wall using 2 cc aliquots of a mixture of 100 units Botox mixed into 10 cc of preservative-free normal saline.  There was no bleeding noted.  The bladder was then completely drained.  The patient tolerated the procedure well.  She received 100 mg nitrofurantoin at the completion of the case.       Assessment/Plan      68-year-old with urge urinary incontinence, history of recurrent nephrolithiasis, vaginal atrophy, and pelvic floor weakness having  undergone 100 units of Botox today 01/11/2024.     #1 uncomplicated 100 units Botox today 1/11/2024.  She was having excellent results with her oxybutynin but unfortunately noted significantly bothersome dry mouth complaints. She has utilized her trospium therapy with excellent results but does continue to note episodic leakage. She initially noted bothersome dry mouth complaints but this improved with taking the medication before bed. We did discuss proceeding with sugarless lozenges, sugarless gum, or Biotene therapy to help with her dry mouth complaints. Both Gemtesa and Myrbetriq are cost prohibitive.  We discussed stopping her Solifenacin therapy now.  She was provided a 3-day course of Macrobid.  She will follow-up in 2 weeks to discuss her lower urinary tract symptoms.     #2 we discussed the patient's recurrent nephrolithiasis complaints. We discussed the importance of establishing care with one of our stone specialists. She is following up with Dr. Velasquez for stone related concerns.     3. We again discussed the patient's vaginal atrophy and how this affects her lower urinary tract complaints. She will continue her vaginal estrogen therapy 3 times a week moving forward     4. The patient will follow-up in 2 weeks for urinalysis and PVR and to discuss her lower urinary tract symptoms.     MARK Grey MD     Scribe Attestation  By signing my name below, I, Aria Peña attest that this documentation has been prepared under the direction and in the presence of Denis Grey MD. All medical record entries made by the Scribe were at my direction or personally dictated by me. I have reviewed the chart and agree that the record accurately reflects my personal performance of the history, physical exam, discussion and plan.

## 2024-01-23 DIAGNOSIS — E03.9 HYPOTHYROIDISM, UNSPECIFIED TYPE: ICD-10-CM

## 2024-01-23 DIAGNOSIS — R82.991 HYPOCITRATURIA: Primary | ICD-10-CM

## 2024-01-24 RX ORDER — POTASSIUM CITRATE 10 MEQ/1
10 TABLET, EXTENDED RELEASE ORAL
Qty: 180 TABLET | Refills: 3 | Status: SHIPPED | OUTPATIENT
Start: 2024-01-24 | End: 2025-01-23

## 2024-01-26 ENCOUNTER — OFFICE VISIT (OUTPATIENT)
Dept: UROLOGY | Facility: CLINIC | Age: 69
End: 2024-01-26
Payer: COMMERCIAL

## 2024-01-26 VITALS
SYSTOLIC BLOOD PRESSURE: 131 MMHG | HEART RATE: 81 BPM | BODY MASS INDEX: 31.93 KG/M2 | DIASTOLIC BLOOD PRESSURE: 79 MMHG | WEIGHT: 158.1 LBS

## 2024-01-26 DIAGNOSIS — N39.41 URGE URINARY INCONTINENCE: Primary | ICD-10-CM

## 2024-01-26 DIAGNOSIS — R35.0 URINARY FREQUENCY: ICD-10-CM

## 2024-01-26 DIAGNOSIS — R10.2 FEMALE PELVIC PAIN: ICD-10-CM

## 2024-01-26 DIAGNOSIS — N95.2 ATROPHIC VAGINITIS: ICD-10-CM

## 2024-01-26 DIAGNOSIS — N81.89 PELVIC FLOOR WEAKNESS: ICD-10-CM

## 2024-01-26 LAB
POC APPEARANCE, URINE: CLEAR
POC BILIRUBIN, URINE: NEGATIVE
POC BLOOD, URINE: NEGATIVE
POC COLOR, URINE: YELLOW
POC GLUCOSE, URINE: NEGATIVE MG/DL
POC KETONES, URINE: NEGATIVE MG/DL
POC LEUKOCYTES, URINE: NEGATIVE
POC NITRITE,URINE: NEGATIVE
POC PH, URINE: 7 PH
POC PROTEIN, URINE: NEGATIVE MG/DL
POC SPECIFIC GRAVITY, URINE: 1.01
POC UROBILINOGEN, URINE: 0.2 EU/DL

## 2024-01-26 PROCEDURE — 1157F ADVNC CARE PLAN IN RCRD: CPT | Performed by: OBSTETRICS & GYNECOLOGY

## 2024-01-26 PROCEDURE — 1036F TOBACCO NON-USER: CPT | Performed by: OBSTETRICS & GYNECOLOGY

## 2024-01-26 PROCEDURE — 1126F AMNT PAIN NOTED NONE PRSNT: CPT | Performed by: OBSTETRICS & GYNECOLOGY

## 2024-01-26 PROCEDURE — 3008F BODY MASS INDEX DOCD: CPT | Performed by: OBSTETRICS & GYNECOLOGY

## 2024-01-26 PROCEDURE — 99213 OFFICE O/P EST LOW 20 MIN: CPT | Performed by: OBSTETRICS & GYNECOLOGY

## 2024-01-26 NOTE — PATIENT INSTRUCTIONS
You will be contacted by Christine, my , for scheduling of urodynamics at your earliest convenience.    We will follow-up 24 to 48 hours thereafter to discuss these results and the next steps.    Please contact the clinic with any questions or concerns.    [phone]

## 2024-01-26 NOTE — PROGRESS NOTES
Subjective   Patient ID: Susan Rowan is a 68 y.o. female who presents for 100 units of intradetrusor Botox.   HPI   68-year-old with urge urinary incontinence, history of recurrent nephrolithiasis, vaginal atrophy, and pelvic floor weakness having undergone 100 units of Botox today 01/11/2024.    The patient appeared to have benefits in her nighttime urgency and frequency complaints with her recent intradetrusor Botox.  Unfortunately she has not had any significant daytime improvements in her frequency and urgency.  PVR is 12 and urinalysis completely negative.  She continues to note daytime urgency every 1-2 hours.      She denies any vaginal complaints, no abnormal bleeding or discharge.     She denies any bowel related complaints, no fecal or flatal incontinence.     She has no other complaints.     From Previous note   68-year-old with urge urinary incontinence, history of recurrent nephrolithiasis, vaginal atrophy, and pelvic floor weakness having undergone 100 units of Botox today 01/11/2024.    The patient has been utilizing Solifenacin 10 mg, she was instructed to stop this medication. She denies any UTI like symptoms.     She denies any vaginal complaints, no abnormal bleeding or discharge.     She denies any bowel related complaints, no fecal or flatal incontinence.     She has no other complaints.     From Previous note  This visit was performed through telemedicine   68-year-old with urge urinary incontinence, history of recurrent nephrolithiasis, vaginal atrophy, and pelvic floor weakness.     The patient has been utilizing the Trospium 60 mg daily therapy with excellent results. She notes 0-1 episodes of nocturia but denies any enuresis. She voids every 3-4  hour during the day. However she notes incontinence on working out or waiting too long. She was taking the medication at night and did note dry mouth complaints but this was not bothersome for her. She denies any UTI like symptoms.     She denies  "any stress urinary incontinence but feels that her leakage is associated with \"waiting too long\".     She denies any vaginal complaints, no abnormal vaginal bleeding or discharge. She is utilizing the vaginal estrogen cream.      She denies any bowel related complaints, no fecal or flatal incontinence.     She has no other complaints.    From Previous note  This visit was performed through telemedicine   68-year-old with urge urinary incontinence, history of recurrent nephrolithiasis, vaginal atrophy, and pelvic floor weakness     The patient was utilizing the Trospium therapy with excellent results. She ran out of it hence noting recurrence of her urgency and incontinence symptoms. She was taking the medication at night and did note dry mouth complaints but this was not bothersome for her as she is utilizing the CPAP at night for sleep apnea. It appears that Myrbetriq and Gemtesa are cost prohibitive. She denies any UTI like symptoms.      She denies any vaginal complaints, no abnormal vaginal bleeding or discharge. She is utilizing the vaginal estrogen cream.      She denies any bowel related complaints, no fecal or flatal incontinence.     She has no other complaints.     From previous note   This visit was performed through telemedicine  67-year-old with urge urinary incontinence, history of recurrent nephrolithiasis, vaginal atrophy, and pelvic floor weakness.     The patient patient appears to be having excellent results with the Trospium, however she is having terrible dry mouth complaints. She unable to manage these dry mouth complaints with the lozenges and Biotene. Trial of Myrbetriq OR Gemtesa were discussed at length. Other third line options including intradetrusor Botox, InterStim and PTNS were discussed at length.      She denies any vaginal complaints, no abnormal vaginal bleeding or discharge. She is utilizing the vaginal estrogen cream.      She denies any bowel related complaints, no fecal or " flatal incontinence.     She has no other complaints.     From previous note  This visit was performed through telemedicine  67-year-old with urge urinary incontinence, history of recurrent nephrolithiasis, vaginal atrophy, and pelvic floor weakness.     The patient patient appears to be having excellent results with the Trospium, however she is having dry mouth complaints. She is using lozenges and other Biotene products to manage the dry mouth. However she is wishes to continue with this medication. She denies any UTI like symptoms.      She denies any vaginal complaints, no abnormal vaginal bleeding or discharge. She is utilizing the vaginal estrogen cream.      She denies any bowel related complaints, no fecal or flatal incontinence.     She has no other complaints.     From previous note  This visit was performed through telemedicine  67-year-old with urge urinary incontinence, history of recurrent nephrolithiasis, vaginal atrophy, and pelvic floor weakness.     The patient appears to be having excellent results with the Oxybutynin but she is having dry mouth complaints with it. She has not stated vaginal estrogen cream and wishes to do it at her earliest connivence. .She denies any UTI like symptoms today.     She denies any bowel related complaints, no fecal or flatal incontinence.     She denies any vaginal complaints, no abnormal vaginal bleeding or discharge.     She has no other complaints.     From previous note  67-year-old presenting as a self referral with complaints urinary urgency, frequency and kidney stones     The patient complaints of urinary urgency and frequency. She voids every 2 hours during the day and notices 2-3 episodes of nocturia but denies enuresis. She leaks on a full bladder and can make it tot he bathroom with difficulty. She leaks on laughing, coughing and sneezing. She has a history of kidney stone and she passed it once and has gross hematuria and is seeing an outside provider  but needs a referral . She states she had a UTI like symptoms when she presented tot  emergency room in November.     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      She denies any bowel related complaints, no fecal or flatal incontinence.      She is sexually active and denies any dyspareunia, she denies any vaginal complaints, no abnormal vaginal bleeding or discharge.     She has no other complaints.               Review of Systems  Constitutional: No fever, No chills and No fatigue.   Eyes: No vision problems and No dryness of the eyes.   ENT: No dry mouth, No hearing loss and No nosebleeds.   Cardiovascular: No chest pain, No palpitations and No orthopnea.   Respiratory: No shortness of breath, No cough and No wheezing.   Gastrointestinal: No abdominal pain, No constipation, No nausea, No diarrhea, No vomiting and No melena.   Genitourinary: As noted in HPI.   Musculoskeletal: No back pain, No myalgias, No muscle weakness, No joint swelling and No leg edema.   Integumentary: No rashes, No skin lesion and No itching.   Neurological: No headache, No numbness and No dizziness.   Psychiatric: No sleep disturbances, No anxiety and No depression.   Endocrine: No hot flashes, No loss of hair and No hirsutism.   Hematologic/Lymphatic: No swollen glands, No tendency for easy bleeding and No tendency for easy bruising.   All other systems have been reviewed and are negative for complaint.        Objective   Physical Exam    PHYSICAL EXAMINATION:  No LMP recorded.  There is no height or weight on file to calculate BMI.  There were no vitals taken for this visit.  General Appearance: well appearing  Neuro: Alert and oriented   HEENT: mucous membranes moist, neck supple  Resp: No respiratory distress, normal work of breathing  MSK: normal range of motion, gait appropriate          Assessment/Plan      68-year-old with urge urinary incontinence, history of recurrent nephrolithiasis, vaginal atrophy,  and pelvic floor weakness having undergone 100 units of Botox today 01/11/2024.     #1 unfortunately the patient has not had any significant benefits following her 100 units Botox 1/11/2024.  She was having excellent results with her oxybutynin but unfortunately noted significantly bothersome dry mouth complaints. She has utilized her trospium therapy with excellent results but does continue to note episodic leakage. She initially noted bothersome dry mouth complaints but this improved with taking the medication before bed. We did discuss proceeding with sugarless lozenges, sugarless gum, or Biotene therapy to help with her dry mouth complaints. Both Gemtesa and Myrbetriq are cost prohibitive.  She also had some benefit with Solifenacin therapy but is since stopped this following her Botox therapy.  We discussed given her persistent complaints proceeding with urodynamic testing.  She will be scheduled at her earliest convenience.      #2 we discussed the patient's recurrent nephrolithiasis complaints. We discussed the importance of establishing care with one of our stone specialists. She is following up with Dr. Velasquez for stone related concerns.     3. We again discussed the patient's vaginal atrophy and how this affects her lower urinary tract complaints. She will continue her vaginal estrogen therapy 3 times a week moving forward     4. The patient will follow-up for urodynamic testing at her earliest convenience.     MARK Grey MD     Scribe Attestation  By signing my name below, Elva GARCIA Scribe attest that this documentation has been prepared under the direction and in the presence of Denis Grey MD. All medical record entries made by the Scribe were at my direction or personally dictated by me. I have reviewed the chart and agree that the record accurately reflects my personal performance of the history, physical exam, discussion and plan.

## 2024-02-01 ENCOUNTER — PATIENT MESSAGE (OUTPATIENT)
Dept: CARDIOLOGY | Facility: CLINIC | Age: 69
End: 2024-02-01
Payer: COMMERCIAL

## 2024-02-01 DIAGNOSIS — E78.00 PURE HYPERCHOLESTEROLEMIA: Primary | ICD-10-CM

## 2024-02-02 DIAGNOSIS — E03.9 HYPOTHYROIDISM, UNSPECIFIED TYPE: ICD-10-CM

## 2024-02-02 DIAGNOSIS — F41.9 ANXIETY: ICD-10-CM

## 2024-02-02 RX ORDER — ROSUVASTATIN CALCIUM 20 MG/1
20 TABLET, COATED ORAL DAILY
Qty: 90 TABLET | Refills: 3 | Status: SHIPPED | OUTPATIENT
Start: 2024-02-02

## 2024-02-05 DIAGNOSIS — E78.5 HYPERLIPIDEMIA, UNSPECIFIED HYPERLIPIDEMIA TYPE: Primary | ICD-10-CM

## 2024-02-05 RX ORDER — FLUOXETINE HYDROCHLORIDE 20 MG/1
20 CAPSULE ORAL DAILY
Qty: 90 CAPSULE | Refills: 0 | Status: SHIPPED | OUTPATIENT
Start: 2024-02-05 | End: 2024-05-21 | Stop reason: SDUPTHER

## 2024-02-05 RX ORDER — EZETIMIBE 10 MG/1
10 TABLET ORAL DAILY
Qty: 90 TABLET | Refills: 3 | Status: SHIPPED | OUTPATIENT
Start: 2024-02-05 | End: 2024-05-20 | Stop reason: SDUPTHER

## 2024-02-05 RX ORDER — BUSPIRONE HYDROCHLORIDE 10 MG/1
20 TABLET ORAL 2 TIMES DAILY
Qty: 360 TABLET | Refills: 3 | Status: SHIPPED | OUTPATIENT
Start: 2024-02-05 | End: 2024-05-21 | Stop reason: SDUPTHER

## 2024-02-05 RX ORDER — LEVOTHYROXINE SODIUM 88 UG/1
88 TABLET ORAL DAILY
Qty: 90 TABLET | Refills: 3 | Status: SHIPPED
Start: 2024-02-05 | End: 2024-04-09 | Stop reason: SDUPTHER

## 2024-02-06 ENCOUNTER — PROCEDURE VISIT (OUTPATIENT)
Dept: UROLOGY | Facility: CLINIC | Age: 69
End: 2024-02-06
Payer: COMMERCIAL

## 2024-02-06 DIAGNOSIS — N39.41 URGE URINARY INCONTINENCE: Primary | ICD-10-CM

## 2024-02-06 LAB
POC APPEARANCE, URINE: CLEAR
POC BILIRUBIN, URINE: NEGATIVE
POC BLOOD, URINE: ABNORMAL
POC COLOR, URINE: YELLOW
POC GLUCOSE, URINE: ABNORMAL MG/DL
POC KETONES, URINE: NEGATIVE MG/DL
POC LEUKOCYTES, URINE: NEGATIVE
POC NITRITE,URINE: NEGATIVE
POC PH, URINE: 6 PH
POC PROTEIN, URINE: NEGATIVE MG/DL
POC SPECIFIC GRAVITY, URINE: 1.02
POC UROBILINOGEN, URINE: 1 EU/DL

## 2024-02-06 PROCEDURE — 51741 ELECTRO-UROFLOWMETRY FIRST: CPT | Performed by: UROLOGY

## 2024-02-06 PROCEDURE — 51797 INTRAABDOMINAL PRESSURE TEST: CPT | Performed by: UROLOGY

## 2024-02-06 PROCEDURE — 51729 CYSTOMETROGRAM W/VP&UP: CPT | Performed by: UROLOGY

## 2024-02-06 PROCEDURE — 81003 URINALYSIS AUTO W/O SCOPE: CPT | Performed by: UROLOGY

## 2024-02-06 PROCEDURE — 51784 ANAL/URINARY MUSCLE STUDY: CPT | Performed by: UROLOGY

## 2024-02-06 NOTE — PROGRESS NOTES
Susan Rowan 69 y/o female     Patient was referred by Dr. Grey to evaluate urinary incontinence.  Dr. Morgan was present in the office at time of study.  Pre uroflow was completed today with a PVR of 72ml.  Urine was clear for UTI.  Patient did leak on CLPP/VLPP.  Patient did not have DO with leak.  PVR after study was 0ml.    Patient instructed to increase fluids if blood in the urine or burning due to cath insertion.  Patient understood and consented to Urodynamics.  Patient will follow up with Dr. Grey to review results.  2/6/24 CM

## 2024-02-08 ENCOUNTER — TELEMEDICINE (OUTPATIENT)
Dept: UROLOGY | Facility: CLINIC | Age: 69
End: 2024-02-08
Payer: COMMERCIAL

## 2024-02-08 DIAGNOSIS — R10.2 FEMALE PELVIC PAIN: ICD-10-CM

## 2024-02-08 DIAGNOSIS — N95.2 ATROPHIC VAGINITIS: ICD-10-CM

## 2024-02-08 DIAGNOSIS — R35.0 URINARY FREQUENCY: ICD-10-CM

## 2024-02-08 DIAGNOSIS — N39.41 URGE URINARY INCONTINENCE: Primary | ICD-10-CM

## 2024-02-08 DIAGNOSIS — N81.89 PELVIC FLOOR WEAKNESS: ICD-10-CM

## 2024-02-08 PROCEDURE — 51797 INTRAABDOMINAL PRESSURE TEST: CPT | Performed by: OBSTETRICS & GYNECOLOGY

## 2024-02-08 PROCEDURE — 99442 PR PHYS/QHP TELEPHONE EVALUATION 11-20 MIN: CPT | Performed by: OBSTETRICS & GYNECOLOGY

## 2024-02-08 PROCEDURE — 1157F ADVNC CARE PLAN IN RCRD: CPT | Performed by: OBSTETRICS & GYNECOLOGY

## 2024-02-08 PROCEDURE — 51741 ELECTRO-UROFLOWMETRY FIRST: CPT | Performed by: OBSTETRICS & GYNECOLOGY

## 2024-02-08 PROCEDURE — 1159F MED LIST DOCD IN RCRD: CPT | Performed by: OBSTETRICS & GYNECOLOGY

## 2024-02-08 PROCEDURE — 3008F BODY MASS INDEX DOCD: CPT | Performed by: OBSTETRICS & GYNECOLOGY

## 2024-02-08 PROCEDURE — 51729 CYSTOMETROGRAM W/VP&UP: CPT | Performed by: OBSTETRICS & GYNECOLOGY

## 2024-02-08 PROCEDURE — 1126F AMNT PAIN NOTED NONE PRSNT: CPT | Performed by: OBSTETRICS & GYNECOLOGY

## 2024-02-08 PROCEDURE — 51784 ANAL/URINARY MUSCLE STUDY: CPT | Performed by: OBSTETRICS & GYNECOLOGY

## 2024-02-08 PROCEDURE — 1036F TOBACCO NON-USER: CPT | Performed by: OBSTETRICS & GYNECOLOGY

## 2024-02-08 RX ORDER — CEFAZOLIN SODIUM 2 G/100ML
2 INJECTION, SOLUTION INTRAVENOUS ONCE
Status: CANCELLED | OUTPATIENT
Start: 2024-02-08 | End: 2024-02-08

## 2024-02-08 RX ORDER — SODIUM CHLORIDE, SODIUM LACTATE, POTASSIUM CHLORIDE, CALCIUM CHLORIDE 600; 310; 30; 20 MG/100ML; MG/100ML; MG/100ML; MG/100ML
100 INJECTION, SOLUTION INTRAVENOUS CONTINUOUS
Status: CANCELLED | OUTPATIENT
Start: 2024-02-08

## 2024-02-08 NOTE — PROGRESS NOTES
Subjective   Patient ID: Susan Rowan is a 68 y.o. female who presents for  UDS test results..   HPI   This visit was performed through telemedicine    68-year-old with urge urinary incontinence, history of recurrent nephrolithiasis, vaginal atrophy, and pelvic floor weakness having undergone 100 units of Botox  01/11/2024.     The patient presents to discuss her UDS test results,  which demonstrates no evidence of stress urinary incontinence, however it shows that her urethra is hypersensitive causing bladder contraction.  Possibility of PTNS was discussed at length.     She denies any vaginal complaints, no abnormal bleeding or discharge.     She denies any bowel related complaints, no fecal or flatal incontinence.     She has no other complaints.     From Previous note  68-year-old with urge urinary incontinence, history of recurrent nephrolithiasis, vaginal atrophy, and pelvic floor weakness having undergone 100 units of Botox today 01/11/2024.    The patient appeared to have benefits in her nighttime urgency and frequency complaints with her recent intradetrusor Botox.  Unfortunately she has not had any significant daytime improvements in her frequency and urgency.  PVR is 12 and urinalysis completely negative.  She continues to note daytime urgency every 1-2 hours.      She denies any vaginal complaints, no abnormal bleeding or discharge.     She denies any bowel related complaints, no fecal or flatal incontinence.     She has no other complaints.     From Previous note   68-year-old with urge urinary incontinence, history of recurrent nephrolithiasis, vaginal atrophy, and pelvic floor weakness having undergone 100 units of Botox today 01/11/2024.    The patient has been utilizing Solifenacin 10 mg, she was instructed to stop this medication. She denies any UTI like symptoms.     She denies any vaginal complaints, no abnormal bleeding or discharge.     She denies any bowel related complaints, no fecal or  "flatal incontinence.     She has no other complaints.     From Previous note  This visit was performed through telemedicine   68-year-old with urge urinary incontinence, history of recurrent nephrolithiasis, vaginal atrophy, and pelvic floor weakness.     The patient has been utilizing the Trospium 60 mg daily therapy with excellent results. She notes 0-1 episodes of nocturia but denies any enuresis. She voids every 3-4  hour during the day. However she notes incontinence on working out or waiting too long. She was taking the medication at night and did note dry mouth complaints but this was not bothersome for her. She denies any UTI like symptoms.     She denies any stress urinary incontinence but feels that her leakage is associated with \"waiting too long\".     She denies any vaginal complaints, no abnormal vaginal bleeding or discharge. She is utilizing the vaginal estrogen cream.      She denies any bowel related complaints, no fecal or flatal incontinence.     She has no other complaints.    From Previous note  This visit was performed through telemedicine   68-year-old with urge urinary incontinence, history of recurrent nephrolithiasis, vaginal atrophy, and pelvic floor weakness     The patient was utilizing the Trospium therapy with excellent results. She ran out of it hence noting recurrence of her urgency and incontinence symptoms. She was taking the medication at night and did note dry mouth complaints but this was not bothersome for her as she is utilizing the CPAP at night for sleep apnea. It appears that Myrbetriq and Gemtesa are cost prohibitive. She denies any UTI like symptoms.      She denies any vaginal complaints, no abnormal vaginal bleeding or discharge. She is utilizing the vaginal estrogen cream.      She denies any bowel related complaints, no fecal or flatal incontinence.     She has no other complaints.     From previous note   This visit was performed through telemedicine  67-year-old " with urge urinary incontinence, history of recurrent nephrolithiasis, vaginal atrophy, and pelvic floor weakness.     The patient patient appears to be having excellent results with the Trospium, however she is having terrible dry mouth complaints. She unable to manage these dry mouth complaints with the lozenges and Biotene. Trial of Myrbetriq OR Gemtesa were discussed at length. Other third line options including intradetrusor Botox, InterStim and PTNS were discussed at length.      She denies any vaginal complaints, no abnormal vaginal bleeding or discharge. She is utilizing the vaginal estrogen cream.      She denies any bowel related complaints, no fecal or flatal incontinence.     She has no other complaints.     From previous note  This visit was performed through telemedicine  67-year-old with urge urinary incontinence, history of recurrent nephrolithiasis, vaginal atrophy, and pelvic floor weakness.     The patient patient appears to be having excellent results with the Trospium, however she is having dry mouth complaints. She is using lozenges and other Biotene products to manage the dry mouth. However she is wishes to continue with this medication. She denies any UTI like symptoms.      She denies any vaginal complaints, no abnormal vaginal bleeding or discharge. She is utilizing the vaginal estrogen cream.      She denies any bowel related complaints, no fecal or flatal incontinence.     She has no other complaints.     From previous note  This visit was performed through telemedicine  67-year-old with urge urinary incontinence, history of recurrent nephrolithiasis, vaginal atrophy, and pelvic floor weakness.     The patient appears to be having excellent results with the Oxybutynin but she is having dry mouth complaints with it. She has not stated vaginal estrogen cream and wishes to do it at her earliest connivence. .She denies any UTI like symptoms today.     She denies any bowel related complaints,  no fecal or flatal incontinence.     She denies any vaginal complaints, no abnormal vaginal bleeding or discharge.     She has no other complaints.     From previous note  67-year-old presenting as a self referral with complaints urinary urgency, frequency and kidney stones     The patient complaints of urinary urgency and frequency. She voids every 2 hours during the day and notices 2-3 episodes of nocturia but denies enuresis. She leaks on a full bladder and can make it tot he bathroom with difficulty. She leaks on laughing, coughing and sneezing. She has a history of kidney stone and she passed it once and has gross hematuria and is seeing an outside provider but needs a referral . She states she had a UTI like symptoms when she presented tot  emergency room in November.     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      She denies any bowel related complaints, no fecal or flatal incontinence.      She is sexually active and denies any dyspareunia, she denies any vaginal complaints, no abnormal vaginal bleeding or discharge.     She has no other complaints.               Review of Systems  Constitutional: No fever, No chills and No fatigue.   Eyes: No vision problems and No dryness of the eyes.   ENT: No dry mouth, No hearing loss and No nosebleeds.   Cardiovascular: No chest pain, No palpitations and No orthopnea.   Respiratory: No shortness of breath, No cough and No wheezing.   Gastrointestinal: No abdominal pain, No constipation, No nausea, No diarrhea, No vomiting and No melena.   Genitourinary: As noted in HPI.   Musculoskeletal: No back pain, No myalgias, No muscle weakness, No joint swelling and No leg edema.   Integumentary: No rashes, No skin lesion and No itching.   Neurological: No headache, No numbness and No dizziness.   Psychiatric: No sleep disturbances, No anxiety and No depression.   Endocrine: No hot flashes, No loss of hair and No hirsutism.   Hematologic/Lymphatic: No  swollen glands, No tendency for easy bleeding and No tendency for easy bruising.   All other systems have been reviewed and are negative for complaint.        Objective   Physical Exam  This visit was performed through telemedicine     Urodynamics was performed 2/6/2024.    Free uroflow demonstrated a voided volume of 72 cc with a PVR of 72 cc with a max flow of 13 cc a second and average flow of 3.5.  Patient appeared to be a Valsalva voider without evidence of urinary obstruction.    During the filling phase the patient had for sensation at 54 cc, first desire to 142 cc, and strong desire 345 cc capacity at 380 cc.  She did appear to have cough induced detrusor overactivity with hypersensitivity.  There was no evidence of detrusor sphincter dyssynergia and the patient had appropriate pelvic floor contractions.  During the voiding phase she voided a volume of 390 cc with a PVR of 0 with a maximum flow rate of 36 cc a second and average flow rate of 12 cc a second with a peak detrusor pressure of 14 cm of water and a peak detrusor pressure at maximum flow of 4.6 cm of water.    Assessment/Plan      68-year-old with urge urinary incontinence, history of recurrent nephrolithiasis, vaginal atrophy, and pelvic floor weakness having undergone 100 units of Botox  01/11/2024.     #1 urodynamics performed 2/6/2024 is notable for bladder hypersensitivity with detrusor overactivity and what appears to be cough induced detrusor overactivity.  Unfortunately the patient has not had any significant benefits following her 100 units Botox 1/11/2024.  She was having excellent results with her oxybutynin but unfortunately noted significantly bothersome dry mouth complaints. She has utilized her trospium therapy with excellent results but does continue to note episodic leakage. She initially noted bothersome dry mouth complaints but this improved with taking the medication before bed. We did discuss proceeding with sugarless lozenges,  sugarless gum, or Biotene therapy to help with her dry mouth complaints. Both Gemtesa and Myrbetriq are cost prohibitive.  She also had some benefit with Solifenacin therapy but is since stopped this following her Botox therapy.  We discussed the findings of her urodynamics and the difference between stress urinary incontinence and her detrusor overactivity and bladder hypersensitivity.  She is most bothered by these complaints and we discussed proceeding with sacral neuromodulation.  We discussed the InterStim trial as well as its efficacy, safety, and postoperative restrictions.  The patient will be scheduled at her earliest convenience for a stage I InterStim trial.    #2 we discussed the patient's recurrent nephrolithiasis complaints. We discussed the importance of establishing care with one of our stone specialists. She is following up with Dr. Velasquez for stone related concerns.     3. We again discussed the patient's vaginal atrophy and how this affects her lower urinary tract complaints. She will continue her vaginal estrogen therapy 3 times a week moving forward     4. The patient will be scheduled at her earliest convenience for stage I InterStim trial.     MARK Grey MD     Scribe Attestation  By signing my name below, I, Aria Peña attest that this documentation has been prepared under the direction and in the presence of Denis Grey MD. All medical record entries made by the Scribe were at my direction or personally dictated by me. I have reviewed the chart and agree that the record accurately reflects my personal performance of the history, physical exam, discussion and plan.

## 2024-02-12 ENCOUNTER — HOSPITAL ENCOUNTER (OUTPATIENT)
Facility: HOSPITAL | Age: 69
Setting detail: OUTPATIENT SURGERY
End: 2024-02-12
Attending: OBSTETRICS & GYNECOLOGY | Admitting: OBSTETRICS & GYNECOLOGY
Payer: COMMERCIAL

## 2024-02-12 DIAGNOSIS — R05.3 CHRONIC COUGH: ICD-10-CM

## 2024-02-12 PROBLEM — N39.41 URGE URINARY INCONTINENCE: Status: ACTIVE | Noted: 2024-02-08

## 2024-02-12 RX ORDER — BACLOFEN 10 MG/1
TABLET ORAL
Qty: 360 TABLET | Refills: 2 | Status: CANCELLED | OUTPATIENT
Start: 2024-02-12

## 2024-02-15 ENCOUNTER — OFFICE VISIT (OUTPATIENT)
Dept: OBSTETRICS AND GYNECOLOGY | Facility: CLINIC | Age: 69
End: 2024-02-15
Payer: COMMERCIAL

## 2024-02-15 VITALS
DIASTOLIC BLOOD PRESSURE: 70 MMHG | HEIGHT: 60 IN | SYSTOLIC BLOOD PRESSURE: 116 MMHG | BODY MASS INDEX: 31.41 KG/M2 | WEIGHT: 160 LBS

## 2024-02-15 DIAGNOSIS — Z12.4 CERVICAL CANCER SCREENING: ICD-10-CM

## 2024-02-15 DIAGNOSIS — Z78.0 ASYMPTOMATIC POSTMENOPAUSAL STATE: ICD-10-CM

## 2024-02-15 DIAGNOSIS — Z12.31 ENCOUNTER FOR SCREENING MAMMOGRAM FOR BREAST CANCER: ICD-10-CM

## 2024-02-15 DIAGNOSIS — Z01.419 ENCOUNTER FOR WELL WOMAN EXAM WITH ROUTINE GYNECOLOGICAL EXAM: Primary | ICD-10-CM

## 2024-02-15 DIAGNOSIS — Z13.820 SCREENING FOR OSTEOPOROSIS: ICD-10-CM

## 2024-02-15 PROCEDURE — 1160F RVW MEDS BY RX/DR IN RCRD: CPT | Performed by: OBSTETRICS & GYNECOLOGY

## 2024-02-15 PROCEDURE — 87624 HPV HI-RISK TYP POOLED RSLT: CPT

## 2024-02-15 PROCEDURE — 3008F BODY MASS INDEX DOCD: CPT | Performed by: OBSTETRICS & GYNECOLOGY

## 2024-02-15 PROCEDURE — 99397 PER PM REEVAL EST PAT 65+ YR: CPT | Performed by: OBSTETRICS & GYNECOLOGY

## 2024-02-15 PROCEDURE — 1126F AMNT PAIN NOTED NONE PRSNT: CPT | Performed by: OBSTETRICS & GYNECOLOGY

## 2024-02-15 PROCEDURE — 88175 CYTOPATH C/V AUTO FLUID REDO: CPT

## 2024-02-15 PROCEDURE — 1036F TOBACCO NON-USER: CPT | Performed by: OBSTETRICS & GYNECOLOGY

## 2024-02-15 PROCEDURE — 1159F MED LIST DOCD IN RCRD: CPT | Performed by: OBSTETRICS & GYNECOLOGY

## 2024-02-15 PROCEDURE — 1157F ADVNC CARE PLAN IN RCRD: CPT | Performed by: OBSTETRICS & GYNECOLOGY

## 2024-02-15 RX ORDER — SULFAMETHOXAZOLE AND TRIMETHOPRIM 800; 160 MG/1; MG/1
TABLET ORAL
COMMUNITY
Start: 2024-02-13 | End: 2024-03-11 | Stop reason: WASHOUT

## 2024-02-15 RX ORDER — PRAVASTATIN SODIUM 80 MG/1
80 TABLET ORAL
COMMUNITY
Start: 2015-04-13 | End: 2024-03-11 | Stop reason: WASHOUT

## 2024-02-15 RX ORDER — AZITHROMYCIN 250 MG/1
TABLET, FILM COATED ORAL
COMMUNITY
Start: 2022-03-07 | End: 2024-03-11 | Stop reason: WASHOUT

## 2024-02-15 RX ORDER — METFORMIN HYDROCHLORIDE 500 MG/1
TABLET, EXTENDED RELEASE ORAL
COMMUNITY
Start: 2023-12-25

## 2024-02-15 NOTE — PROGRESS NOTES
PAP 18 NEG HPV NEG  MAMMO 2023  DEXA   COLON 17    ASSESSMENT/PLAN    1. Encounter for well woman exam with routine gynecological exam  Routine well woman visit.   Your exam was normal today.   A pap and HPV test was done. If you are connected with the  on line system, you will be notified about your pap results through the patient portal.     2. Encounter for screening mammogram for breast cancer  A screening mammogram requisition has been placed.   Please schedule your mammogram     3. Screening for osteoporosis.  Bone density requisition.    SUBJECTIVE    HPI    67 yo  for routine well woman visit.  Last visit 3/2022 for pelvic pressure.  Up to date with PCP. Sleep apnea, uses CPAP. On fluoxetine, buspar, statin, baclofin, potassium.  Seeing Dr. Que Collier for urinary incontinence. S/p urodynamics 2 weeks ago. Currently being treated for UTI. He recommends interStim. Pt considering.  Denies any other intervening medical or surgical issues.   Works out with light weights and low impact exercise 3 times weekly.    2 grandchildren, they live in [place].  Parents 93 and 95, doing well, assisted living.      Review of Systems    Constitutional: no fever, no chills, no recent weight gain, no recent weight loss and no fatigue.   Eyes: no eye pain, no vision problems and no dryness of the eyes.   ENT: no hearing loss, no nosebleeds and no sinus congestion.   Cardiovascular: no chest pain, no palpitations and no orthopnea.   Respiratory: no shortness of breath, no cough and no wheezing.   Gastrointestinal: no abdominal pain, no constipation, + nausea, + diarrhea and + vomiting.   Genitourinary: + pelvic pain, no dysuria, + urinary incontinence, +_ vaginal dryness, + vaginal itching, no dyspareunia, no dysmenorrhea, no sexual problems, no change in urinary frequency, no vaginal discharge, no unexplained vaginal bleeding and no lesion/sore.   Musculoskeletal: + back pain, + joint  "swelling and + leg edema.   Integumentary: no rashes, no skin lesions, no breast pain, no nipple discharge and no breast lump.   Neurological: no headache, no numbness and no dizziness.   Psychiatric: no sleep disturbances, no anxiety and no depression.   Endocrine: no hot flashes, no loss of hair and no hirsutism.   Hematologic/Lymphatic: no swollen glands, no tendency for easy bleeding and no tendency for easy bruising.       OBJECTIVE    /70   Ht 1.511 m (4' 11.5\")   Wt 72.6 kg (160 lb)   BMI 31.78 kg/m²      Physical Exam     Constitutional: Alert and in no acute distress. Well developed, well nourished   Head and Face: Head and face: normal   Eyes: Normal external exam - nonicteric sclera, extraocular movements intact (EOMI) and no ptosis.   Ears, Nose, Mouth, and Throat: External inspection of ears and nose: normal   Neck: no neck asymmetry. Supple and thyroid not enlarged and there were no palpable thyroid nodules   Cardiovascular: Heart rate and rhythm were normal, normal S1 and S2, no gallops, and no murmurs   Pulmonary: No respiratory distress and clear bilateral breath sounds   Chest: Breasts: normal appearance, no nipple discharge and no skin changes and palpation of breasts and axillae: no palpable mass and no axillary lymphadenopathy   Abdomen: soft nontender; no abdominal mass palpated, no organomegaly and no hernias   Genitourinary: external genitalia: normal, no inguinal lymphadenopathy, Bartholin's urethral and [place]'s glands: normal, urethra: normal, bladder: normal on palpation and perianal area: normal   Vagina: normal.   Cervix: Normal appearing without lesions.  Uterus: Normal, mobile, nontender.  Right Adnexa/parametria: Normal.   Left Adnexa/parametria: Normal.   Skin: normal skin color and pigmentation, normal skin turgor, and no rash.   Psychiatric: alert and oriented x 3., affect normal to patient baseline and mood: appropriate          Lisa Jordan MD    "

## 2024-02-20 ENCOUNTER — APPOINTMENT (OUTPATIENT)
Dept: PRIMARY CARE | Facility: CLINIC | Age: 69
End: 2024-02-20
Payer: COMMERCIAL

## 2024-03-04 ENCOUNTER — OFFICE VISIT (OUTPATIENT)
Dept: SLEEP MEDICINE | Facility: CLINIC | Age: 69
End: 2024-03-04
Payer: COMMERCIAL

## 2024-03-04 VITALS
SYSTOLIC BLOOD PRESSURE: 135 MMHG | HEART RATE: 72 BPM | BODY MASS INDEX: 31.62 KG/M2 | DIASTOLIC BLOOD PRESSURE: 81 MMHG | TEMPERATURE: 98 F | WEIGHT: 159.2 LBS

## 2024-03-04 DIAGNOSIS — G47.33 OSA (OBSTRUCTIVE SLEEP APNEA): Primary | ICD-10-CM

## 2024-03-04 PROCEDURE — 99213 OFFICE O/P EST LOW 20 MIN: CPT | Performed by: PHYSICIAN ASSISTANT

## 2024-03-04 PROCEDURE — 1036F TOBACCO NON-USER: CPT | Performed by: PHYSICIAN ASSISTANT

## 2024-03-04 PROCEDURE — 1157F ADVNC CARE PLAN IN RCRD: CPT | Performed by: PHYSICIAN ASSISTANT

## 2024-03-04 PROCEDURE — 3008F BODY MASS INDEX DOCD: CPT | Performed by: PHYSICIAN ASSISTANT

## 2024-03-04 PROCEDURE — 1159F MED LIST DOCD IN RCRD: CPT | Performed by: PHYSICIAN ASSISTANT

## 2024-03-04 PROCEDURE — 1160F RVW MEDS BY RX/DR IN RCRD: CPT | Performed by: PHYSICIAN ASSISTANT

## 2024-03-04 PROCEDURE — 1126F AMNT PAIN NOTED NONE PRSNT: CPT | Performed by: PHYSICIAN ASSISTANT

## 2024-03-04 NOTE — ASSESSMENT & PLAN NOTE
-adjust pressures to 4-14cwp as rAHI is 8; did not like 7-14 as adjusted at last visit  -will work to increase compliance as she gets new supplies; happy with N30i style mask  -states does not believe she is snoring without pap recently; no significant weight loss or other changes

## 2024-03-04 NOTE — PATIENT INSTRUCTIONS
[facility] Sleep [facility]  DO [address] [facility] [facility]  [address] [facility] [address]       NAME: Susan Rowan   DATE: 03/04/24    Your Sleep Provider Today: María Elena Flores PA-C  Your Primary Care Physician: Malik Draper MD   Your Referring Provider: No ref. provider found    DIAGNOSIS:   No diagnosis found.    Thank you for coming to the Sleep Medicine Clinic today! Your sleep medicine provider today was: María Elena Flores PA-C Below is a summary of your treatment plan, other important information, and our contact numbers:      TREATMENT PLAN     - Follow-up in 4-6 months.  - If not already done, sign up for 'My Chart' and send prescription requests or messages through this      Instructions - Common OSWALDO Recs: - For your sleep apnea, continue to use your PAP every night and use it whenever you are sleeping.   - Avoid alcohol or sedatives several hours prior to sleeping.   - Get additional supplies for your PAP (e.g., mask, hose, filters) every 3 months or as your insurance allows from your [employer] company. Replacement cushions for your PAP mask can be requested monthly if airseals are an issue.  - Remember to clean your mask, tubings, and water chamber regularly as instructed.  - Avoid driving or operating heavy machinery when drowsy. A person driving while sleepy is five (5) times more likely to have an accident. If you feel sleepy, pull over and take a short power nap (sleep for less than 30 minutes). Otherwise, ask somebody to drive you.    Follow-up Appointment:   4-6 mo      IMPORTANT INFORMATION     Call 911 for medical emergencies.  Our offices are generally open from Monday-Friday, 9 am - 5 pm.  If you need to get in touch with me, you may either call me and my team(number is below) or you can use [employer].  If a referral for a test, for CPAP, or for another specialist was made, and you have not heard about scheduling this within a week, please call scheduling at  [phone] ([phone]).  If you are unable to make your appointment for clinic or an overnight study, kindly call the office at least 48 hours in advance to cancel and reschedule.  If you are on CPAP, please bring your device's card or the device to each clinic appointment.   There are no supporting services by either the sleep doctors or their staff on weekends and Holidays, or after 5 PM on weekdays.   If you have been asked to come to a sleep study, make sure you bring toiletries, a comfy pillow, and any nighttime medications that you may regularly take. Also be sure to eat dinner before you arrive. We generally do not provide meals.      PRESCRIPTIONS     We require 7 days advanced notice for prescription refills. If we do not receive the request in this time, we cannot guarantee that your medication will be refilled in time.      IMPORTANT PHONE NUMBERS     Sleep Medicine Clinic Fax: [phone]  Appointments (for Adult Sleep Clinic): [phone] - option 2  Appointments (For Sleep Studies): [phone] - option 3  Behavioral Sleep Medicine: [phone]  Sleep Surgery: [phone]  ENT (Otolaryngology): [phone]  Headache Clinic (Neurology): [phone]  Neurology: [phone]  Psychiatry: [phone]  Pulmonary Function Testing (PFT) Center: [phone]  Pulmonary Medicine: [phone]  [employer] (DME): [phone]  [employer] (DME): [phone]  [facility] (DME): [phone]      OUR ADULT SLEEP MEDICINE TEAM   Please do not hesitate to call the office or sleep nurse with any questions between appointments:    Adult Sleep Nurses (Jimena Alejandre, RN and Sonam Thurman RN):  For clinical questions and refilling prescriptions: [phone]  Email sleep diaries and other documents at: [email or web address]    Adult Sleep Medicine Secretaries:  Carisa Mcguire (For Vincenzo/Mor[place]/Mark/Valentina/Quiana/Roland):   P: [phone]  F:  [phone]  Yessica Barron (For [place]uller): P: [phone]  Fax: [phone]  Xuan Hasrossi (For Jurvalery/Blank): P: [phone]  F: [phone]  Kari Grossman (For Irene): P: [phone]  F: [phone]  Aida Perkins (For Ana Maria/Ruy/Zakhary): P: [phone]  F: [phone]  Staci Dalton (For Kalin/Ramana): P: [phone]  F: [phone]     Adult Sleep Medicine Advanced Practice Providers:  Derrek Slater ([place], [place])  Jerrica Redmond ([facility])  Natalie Colon CNP ([place], [place], [place])  Audrey Flores CNP ([place], [place], [place])  Armida Bonilla ([place], [place], [place])  Amy Boles CNP ([facility])        OUR SLEEP TESTING LOCATIONS     Our team will contact you to schedule your sleep study, however, you can contact us as follow:  Main Phone Line (scheduling only): [phone]), option 3  Adult and Pediatric Locations  [facility] (6 years and older): [facility] - 4th floor ([address] [place] [address]) After hours line: [phone]  [facility] (Main campus: All ages): [facility], 6th floor. After hours line: [phone]   [place] (5 years and older; younger considered on case-by-case basis): [address]; [address], [address]. Scheduling  After hours line: [phone]   [place] (6 years and older): [address]; [address]; [address]   [place] (6 years and older): [address] [facility], [address]  After hours line: [phone]   [place] (13 years and older) in [place]: [address], 2nd floor  After hours line: [phone]   [place] (13 year and older): [address]  After hours line: [phone]     Adult Only Locations:   [place] (18 years and older): [address], 2nd floor   [place] (18 years and older): [address] [facility]; 4th floor  After hours line: [phone]  [facility]  "(18 years and older) at [place]: [address] [place] [address]  After hours line: [phone]          CONTACTING YOUR SLEEP MEDICINE PROVIDER     Send a message directly to your provider through \"My Chart\", which is the email service through your  Records Account: [email or web address]   Call [phone] and leave a message. One of the administrative assistants will forward the message to your sleep medicine provider through \"My Chart\" and/or email.     Your sleep medicine provider for this visit was: María Elena Flores PA-C    "

## 2024-03-04 NOTE — PROGRESS NOTES
Patient: Susan Rowan    [id number]  : 1955 -- AGE 68 y.o.    Provider: María Elena Flores PA-C     Location [facility]   Service Date: 3/4/2024              [facility]  Followup Visit Note    HISTORY OF PRESENT ILLNESS     HISTORY OF PRESENT ILLNESS   Susan Rowan is a 68 y.o. female with h/o OSWALDO, obesity, multiple additional comorbidities who presents to a [facility] Clinic for followup.       PAST SLEEP HISTORY:   2022 - [facility] HSAT - AHI of 35       Assessment and plan from last visit: with Dr. Araya in 2023    OSWALDO (obstructive sleep apnea) - Primary G47.33        - doing well with PAP therapy  - minor adjust for range to 7-14 cwp  - renew PAP supply orders              Relevant Orders     Positive Airway Pressure (PAP) Therapy     BMI 32.0-32.9,adult Z68.32           BMI Readings from Last 1 Encounters:   23 32.11 kg/m²   - encouraged healthy weight loss via diet and exercise  - consider referral to the weight loss program at follow-up visit                  Current History    On today's visit, the patient reports doing well with cpap therapy overall. Has switch to N30i mask , which she is happy with. States she has found a good fit and is waiting for new supplies to arrive in mail- reason for lower compliance. Her pressure was adjusted at last visit, but patient had this changed back down as she was not tolerating well. .Does not snore with cpap on, but does not believe she has been recently without it either (per her ). Denies any EDS, but somewhat tired in the afternoons. Occasionally still taking off pap without recall.         ESS:  7  JONATAN:  1  FOSQ: 40    REVIEW OF SYSTEMS     REVIEW OF SYSTEMS  See HPI; all other ROS were reviewed and negative for compliant      ALLERGIES AND MEDICATIONS     ALLERGIES  Allergies   Allergen Reactions    Vancomycin Rash    Cephalexin Unknown and Hives    Penicillins Hives  and Unknown    Tolterodine Unknown, GI Upset, Other and Nausea/vomiting       MEDICATIONS: She has a current medication list which includes the following prescription(s): aspirin - Take 1 tablet (81 mg) by mouth once daily, baclofen - Take 1 tablet (10 mg) by mouth 3 times a day, buspirone - Take 2 tablets (20 mg) by mouth 2 times a day, cholecalciferol - Take by mouth, ezetimibe - Take 1 tablet (10 mg) by mouth once daily, fluoxetine - Take 1 capsule (20 mg) by mouth once daily, levothyroxine - Take 1 tablet (88 mcg) by mouth once daily. Take 1 tablet daily and double on sundays, metformin xr, omega-3 fatty acids-fish oil - Take 1 capsule by mouth once daily in the evening, potassium citrate-citric acid - Take 5 mL (10 mEq) by mouth 3 times a day with meals, rosuvastatin - Take 1 tablet (20 mg) by mouth once daily, aluminum hydrox-magnesium carb - Chew, azithromycin - Take by mouth, potassium citrate cr - Take 1 tablet (10 mEq) by mouth 2 times a day with meals. Do not crush, chew, or split (Patient not taking: Reported on 3/4/2024), pravastatin - Take 1 tablet (80 mg) by mouth once daily, and sulfamethoxazole-trimethoprim.    PAST MEDICAL HISTORY : She  has a past medical history of Encounter for full-term uncomplicated delivery, Encounter for screening for malignant neoplasm of colon (03/22/2017), Other conditions influencing health status (05/25/2018), Other specified postprocedural states, Overactive bladder, Pain in unspecified foot (09/19/2017), Personal history of other diseases of the circulatory system (03/06/2020), Personal history of other diseases of the musculoskeletal system and connective tissue, Personal history of other diseases of the respiratory system (05/16/2018), Personal history of other diseases of the respiratory system (05/16/2018), Personal history of other specified conditions (06/13/2019), Personal history of urinary calculi, Pure hypercholesterolemia, unspecified, and Varicella without  complication.    PAST SURGICAL HISTORY: She  has a past surgical history that includes Other surgical history (03/06/2020) and Other surgical history (03/22/2017).     FAMILY HISTORY: No changes since previous visit. Otherwise non-contributory as charted.     SOCIAL HISTORY  She  reports that she has never smoked. She has never used smokeless tobacco. She reports current alcohol use. She reports that she does not use drugs.       PHYSICAL EXAM     VITAL SIGNS: /81 (BP Location: Right arm, Patient Position: Sitting, BP Cuff Size: Adult)   Pulse 72   Temp 36.7 °C (98 °F)   Wt 72.2 kg (159 lb 3.2 oz)   BMI 31.62 kg/m²        PREVIOUS WEIGHTS:  Wt Readings from Last 3 Encounters:   03/04/24 72.2 kg (159 lb 3.2 oz)   02/15/24 72.6 kg (160 lb)   01/26/24 71.7 kg (158 lb 1.6 oz)       Constitutional: Alert and oriented, cooperative, no acute distress  Head: Normocephalic, atraumatic   Cranial Features: No abnormal craniofacial features  Neck: Supple. Trachea midline.  Pulmonary: Non-labored breathing, speaks in full sentences. No cough.    Cardiac: regular rate   Extremities: No clubbing, no edema  Neuromuscular: Cranial nerves grossly intact, no focal deficits      RESULTS/DATA     Bicarbonate (mmol/L)   Date Value   03/07/2023 26   11/25/2022 26   11/15/2022 27       PAP Adherence  A PAP adherence download was obtained and data was reviewed personally today in clinic.        ASSESSMENT/PLAN     Ms. Rowan is a 68 y.o. female and she returns in followup to the [facility] for OSAWLDO.    Problem List, Orders, Assessment, Recommendations:  Problem List Items Addressed This Visit             ICD-10-CM    OSWALDO (obstructive sleep apnea) - Primary G47.33     -adjust pressures to 4-14cwp as rAHI is 8; did not like 7-14 as adjusted at last visit  -will work to increase compliance as she gets new supplies; happy with N30i style mask  -states does not believe she is snoring without pap recently;  no significant weight loss or other changes         Relevant Orders    Positive Airway Pressure (PAP) Therapy           Disposition    Return to clinic in 4-6 months

## 2024-03-07 DIAGNOSIS — N39.41 URGE URINARY INCONTINENCE: ICD-10-CM

## 2024-03-07 DIAGNOSIS — N39.41 URGE URINARY INCONTINENCE: Primary | ICD-10-CM

## 2024-03-07 RX ORDER — SOLIFENACIN SUCCINATE 10 MG/1
10 TABLET, FILM COATED ORAL DAILY
Qty: 90 TABLET | Refills: 3 | Status: SHIPPED
Start: 2024-03-07 | End: 2024-03-11 | Stop reason: WASHOUT

## 2024-03-07 RX ORDER — SOLIFENACIN SUCCINATE 10 MG/1
10 TABLET, FILM COATED ORAL DAILY
Qty: 90 TABLET | Refills: 3 | Status: SHIPPED | OUTPATIENT
Start: 2024-03-07 | End: 2024-03-07 | Stop reason: SDUPTHER

## 2024-03-11 ENCOUNTER — TELEPHONE (OUTPATIENT)
Dept: CARDIOLOGY | Facility: HOSPITAL | Age: 69
End: 2024-03-11

## 2024-03-11 ENCOUNTER — OFFICE VISIT (OUTPATIENT)
Dept: CARDIOLOGY | Facility: CLINIC | Age: 69
End: 2024-03-11
Payer: COMMERCIAL

## 2024-03-11 VITALS
BODY MASS INDEX: 31.96 KG/M2 | OXYGEN SATURATION: 93 % | HEIGHT: 59 IN | DIASTOLIC BLOOD PRESSURE: 80 MMHG | SYSTOLIC BLOOD PRESSURE: 115 MMHG | WEIGHT: 158.56 LBS | HEART RATE: 79 BPM

## 2024-03-11 DIAGNOSIS — E78.00 PURE HYPERCHOLESTEROLEMIA: ICD-10-CM

## 2024-03-11 DIAGNOSIS — E78.9 LIPID DISORDER: Primary | ICD-10-CM

## 2024-03-11 DIAGNOSIS — I42.2 ASYMMETRIC SEPTAL HYPERTROPHY (MULTI): ICD-10-CM

## 2024-03-11 PROCEDURE — 1126F AMNT PAIN NOTED NONE PRSNT: CPT | Performed by: INTERNAL MEDICINE

## 2024-03-11 PROCEDURE — 93010 ELECTROCARDIOGRAM REPORT: CPT | Performed by: INTERNAL MEDICINE

## 2024-03-11 PROCEDURE — 99214 OFFICE O/P EST MOD 30 MIN: CPT | Performed by: INTERNAL MEDICINE

## 2024-03-11 PROCEDURE — 1159F MED LIST DOCD IN RCRD: CPT | Performed by: INTERNAL MEDICINE

## 2024-03-11 PROCEDURE — 93005 ELECTROCARDIOGRAM TRACING: CPT | Performed by: INTERNAL MEDICINE

## 2024-03-11 PROCEDURE — 1036F TOBACCO NON-USER: CPT | Performed by: INTERNAL MEDICINE

## 2024-03-11 PROCEDURE — 3008F BODY MASS INDEX DOCD: CPT | Performed by: INTERNAL MEDICINE

## 2024-03-11 PROCEDURE — 1157F ADVNC CARE PLAN IN RCRD: CPT | Performed by: INTERNAL MEDICINE

## 2024-03-11 PROCEDURE — 1160F RVW MEDS BY RX/DR IN RCRD: CPT | Performed by: INTERNAL MEDICINE

## 2024-03-11 ASSESSMENT — PAIN SCALES - GENERAL: PAINLEVEL: 0-NO PAIN

## 2024-03-11 ASSESSMENT — COLUMBIA-SUICIDE SEVERITY RATING SCALE - C-SSRS
1. IN THE PAST MONTH, HAVE YOU WISHED YOU WERE DEAD OR WISHED YOU COULD GO TO SLEEP AND NOT WAKE UP?: NO
6. HAVE YOU EVER DONE ANYTHING, STARTED TO DO ANYTHING, OR PREPARED TO DO ANYTHING TO END YOUR LIFE?: NO
2. HAVE YOU ACTUALLY HAD ANY THOUGHTS OF KILLING YOURSELF?: NO

## 2024-03-11 ASSESSMENT — ENCOUNTER SYMPTOMS
OCCASIONAL FEELINGS OF UNSTEADINESS: 0
DEPRESSION: 0
LOSS OF SENSATION IN FEET: 0

## 2024-03-11 NOTE — TELEPHONE ENCOUNTER
Referral placed to [facility] by Dr. Andersen.  Left patient a message to return call to center at [phone]  to facilitate an evaluation with one of our [facility] medical providers.

## 2024-03-11 NOTE — PROGRESS NOTES
Primary Care Physician: Malik Draper MD  Date of Visit: 2024  8:40 AM EDT  Location of visit: [facility] [place] [address]   Last office visit: 2022    Chief Complaint:     Follow-up (Family Hx of Heart Disease)     HPI/Summary  Susan Rowan is a 68 y.o. female who presents for followup cardiology evaluation.     In , a coronary calcium score was 276, with most of the calcification in the RCA.  A stress test in  was negative at a workload of 9.3 METS.  A screening ECG on 2020 showed leftward axis but was otherwise normal.  We saw her in  for comment on abnormal echocardiogram.  The patient's mother was diagnosed many years ago with hypertrophic cardiomyopathy.  The echocardiogram showed septal thickness of 15 mm, posterior wall thickness of 10 mm but no other features noted on the echocardiogram.  Cardiac MRI on 2022 showed a small left ventricle, normal systolic function with an ejection fraction of 69%, mild to moderate basal septal hypertrophy with maximal septal thickness of 1.5 cm.  No abnormal delayed contrast enhancement to suggest myocardial scar.    The patient feels well.  She works full-time for [employer] in the [employer].  She does not experience lightheadedness, palpitations, or syncope.  Her mother was apparently diagnosed with hypertrophic cardiomyopathy more than 20 years ago, but is in her 90s, never underwent heart surgery and does not have a defibrillator.  No family history of sudden cardiac death.        Specialty Problems          Cardiology Problems    Asymmetric septal hypertrophy (CMS/HCC)    Cardiac disease    Elevated coronary artery calcium score    Hyperlipidemia    Lipid disorder    Murmur        Past Medical History:   Diagnosis Date    Encounter for full-term uncomplicated delivery      (spontaneous vaginal delivery)    Encounter for screening for malignant neoplasm of colon 2017    Colon cancer  screening    Other conditions influencing health status 05/25/2018    History of cough    Other specified postprocedural states     History of surgery on arm    Overactive bladder     Pain in unspecified foot 09/19/2017    Acute foot pain    Personal history of other diseases of the circulatory system 03/06/2020    History of abnormal electrocardiography    Personal history of other diseases of the musculoskeletal system and connective tissue     History of arthritis    Personal history of other diseases of the respiratory system 05/16/2018    History of sinusitis    Personal history of other diseases of the respiratory system 05/16/2018    History of sore throat    Personal history of other specified conditions 06/13/2019    History of urinary urgency    Personal history of urinary calculi     History of renal calculi    Pure hypercholesterolemia, unspecified     High cholesterol    Varicella without complication     Varicella without complication          Past Surgical History:   Procedure Laterality Date    OTHER SURGICAL HISTORY  03/06/2020    Elbow surgery    OTHER SURGICAL HISTORY  03/22/2017    Repair Of Humerus / Arm            Social History     Tobacco Use    Smoking status: Never    Smokeless tobacco: Never   Vaping Use    Vaping Use: Never used   Substance Use Topics    Alcohol use: Yes    Drug use: Never        Physical Activity: Not on file            Allergies   Allergen Reactions    Vancomycin Rash    Cephalexin Unknown and Hives    Penicillins Hives and Unknown    Tolterodine Unknown, GI Upset, Other and Nausea/vomiting         Current Outpatient Medications   Medication Instructions    aspirin 81 mg EC tablet 1 tablet, oral, Daily    baclofen (LIORESAL) 10 mg, oral, 3 times daily    busPIRone (BUSPAR) 20 mg, oral, 2 times daily    cholecalciferol (Vitamin D-3) 25 MCG (1000 UT) capsule oral    ezetimibe (ZETIA) 10 mg, oral, Daily    FLUoxetine (PROZAC) 20 mg, oral, Daily    levothyroxine (SYNTHROID,  "LEVOXYL) 88 mcg, oral, Daily, Take 1 tablet daily and double on sundays    metFORMIN  mg 24 hr tablet     omega-3 fatty acids-fish oil 340-1,000 mg capsule 1 capsule, oral, Every evening    potassium citrate CR (Urocit-K-10) 10 mEq ER tablet 10 mEq, oral, 2 times daily with meals, Do not crush, chew, or split.    potassium citrate-citric acid (Polycitra) 1,100-334 mg/5 mL solution 10 mEq, oral, 3 times daily with meals    rosuvastatin (CRESTOR) 20 mg, oral, Daily       ROS     Vital Signs:  Vitals:    03/11/24 0930   BP: 115/80   BP Location: Left arm   Patient Position: Sitting   BP Cuff Size: Adult   Pulse: 79   SpO2: 93%   Weight: 71.9 kg (158 lb 9 oz)   Height: 1.499 m (4' 11\")     Wt Readings from Last 2 Encounters:   03/11/24 71.9 kg (158 lb 9 oz)   03/04/24 72.2 kg (159 lb 3.2 oz)     Body mass index is 32.03 kg/m².       Physical Exam:    On examination she appeared in good health and spirits. Vital signs as documented. Skin warm and dry and without overt rashes. Neck without JVD. Lungs clear. Heart exam notable for regular rhythm, normal sounds and absence of murmurs, rubs or gallops. Abdomen unremarkable and without evidence of organomegaly, masses, or abdominal aortic enlargement. Extremities nonedematous.     Last Labs:  CMP:  Recent Labs     04/06/23  1408 03/07/23  1347 11/25/22  2331 11/15/22  0212 05/21/22  1037 01/05/21  1749   NA  --  142 139 139 142 140   K 4.4 4.5 4.1 4.0 4.5 4.2   CL  --  107 103 106 107 104   CO2  --  26 26 27 26 26   ANIONGAP  --  14 14 10 14 14   BUN  --  17 19 16 14 15   CREATININE  --  0.74 0.95 0.69 0.73 0.70   GLUCOSE  --  80 147* 92 113* 85     Recent Labs     11/25/22  2331 11/15/22  0212 05/21/22  1037 01/05/21  1749 06/13/20  1114 06/13/19  0939 08/31/18  0417   ALBUMIN 4.3 4.2 4.4 4.1 4.1   < > 4.1   ALKPHOS 68 76 81 71 65   < > 74   ALT 29 20 31 25 32   < > 27   AST 28 17 24 19 23   < > 20   BILITOT 0.4 0.3 0.7 0.4 0.7   < > 0.3   LIPASE 73  --   --   --   --  " " --  66    < > = values in this interval not displayed.     CBC:  Recent Labs     03/07/23  1347 11/25/22  2331 11/15/22  0212 05/21/22  1037 01/05/21  1749   WBC 7.0 9.4 7.0 5.7 7.3   HGB 14.1 13.7 13.5 15.2 14.2   HCT 42.8 41.0 40.9 46.0 43.0    255 258 270 257   MCV 92 93 93 94 95     COAG:   Recent Labs     03/07/23  1347   INR 1.0     HEME/ENDO:  Recent Labs     05/21/22  1037 01/05/21  1749 06/13/20  1114 06/13/19  0939   TSH 0.27* 1.85 0.67 1.42   HGBA1C  --   --  6.2  --       CARDIAC: No results for input(s): \"LDH\", \"CKMB\", \"TROPHS\", \"BNP\" in the last 46165 hours.    No lab exists for component: \"CK\", \"CKMBP\"  Recent Labs     05/21/22  1037 06/13/20  1114 06/13/19  0939   CHOL 153 153 186   LDLF 69 65 101*   HDL 62.2 62.8 66.8   TRIG 108 128 91       Last Cardiology Tests:    ECG:    Within normal limits.    Echo:  Echo Results:  No results found for this or any previous visit from the past 3650 days.       Cath:      Stress Test:  Stress Results:  No results found for this or any previous visit from the past 365 days.         Cardiac Imaging:        Assessment/Plan     The patient likely does have a mild form of hypertrophic cardiomyopathy, with septal thickness of 1.5 cm.  Fortunately, MR imaging  performed in 2022 shows basal septal thickness of 1.5 cm, no LVOT obstruction and no abnormal delayed contrast-enhancement.  No HERB.  A lipid panel will be performed during her health screening at [facility] next week.  For completeness, we will refer her to the HCM board.  Will schedule an updated echocardiogram.  In the past, she was unable to afford any genetic testing and so we did not pursue a genetics consultation.      Orders:  Orders Placed This Encounter   Procedures    ECG 12 lead (Clinic Performed)      Followup Appts:  Future Appointments   Date Time Provider Department Center   3/20/2024  8:15 AM MARIA LUISA PAT ROOM [address] [place] [address]   6/17/2024  2:30 PM [facility] [id number] DXA 1 [id number] [facility] "   6/17/2024  3:50 PM [facility] 6 [id number] U [facility]   7/8/2024  4:00 PM María Elena Flores PA-C [id number] [facility]   9/25/2024  3:50 PM Ann Velasquez MD [id number] [facility]           ____________________________________________________________  Mp Andersen MD    Senior Attending Physician  [facility]  [facility]    [facility]  [employer]

## 2024-03-13 NOTE — TELEPHONE ENCOUNTER
Patient returns call. OV with Dr. Rodriguez scheduled on 4/25/24.  Echo scheduled on 3/18/24.    Education provided on definition of Hypertrophic Cardiomyopathy (HCM) & the goals of treatment which include minimizing symptoms & reducing the risk of complications.  Reviewed disease state hallmarks, common symptoms & testing that may be needed to develop a comprehensive individualized treatment plan.  Reviewed pharmacologic therapies used to manage HCM as well as; procedures & advanced treatments that may be needed for optimum disease state management & symptom relief.  Discussed HCM is the most common heritable heart disease affecting men & women equally. we reviewed that genetic testing / screening may be recommended for the patient & family, & we could provide assistance if needed. We discussed that HCM can lead to other health issues, so it is important to work in partnership with their medical providers.  This includes eating a healthy diet, moderate physical activity, staying hydrated with at least 8 cups of water daily, & avoid nicotine, vaping, inhalants, recreational drugs, caffeine, & alcohol. Reiterated patients with HCM can enjoy an active lifestyle & normal lifespan.  Patient provided HCM Nurse Navigator direct contact information [phone] & additional resources that me be of benefit & support for the patient & family.  HCM educational binder mailed to patient

## 2024-03-14 LAB
ATRIAL RATE: 70 BPM
P AXIS: 49 DEGREES
P OFFSET: 200 MS
P ONSET: 143 MS
PR INTERVAL: 168 MS
Q ONSET: 227 MS
QRS COUNT: 11 BEATS
QRS DURATION: 82 MS
QT INTERVAL: 438 MS
QTC CALCULATION(BAZETT): 473 MS
QTC FREDERICIA: 461 MS
R AXIS: 5 DEGREES
T AXIS: 48 DEGREES
T OFFSET: 446 MS
VENTRICULAR RATE: 70 BPM

## 2024-03-18 ENCOUNTER — HOSPITAL ENCOUNTER (OUTPATIENT)
Dept: CARDIOLOGY | Facility: CLINIC | Age: 69
Discharge: HOME | End: 2024-03-18
Payer: COMMERCIAL

## 2024-03-18 DIAGNOSIS — E78.00 PURE HYPERCHOLESTEROLEMIA: ICD-10-CM

## 2024-03-18 DIAGNOSIS — I42.2 ASYMMETRIC SEPTAL HYPERTROPHY (MULTI): ICD-10-CM

## 2024-03-18 LAB
AORTIC VALVE PEAK VELOCITY: 1.44 M/S
AV PEAK GRADIENT: 8.3 MMHG
AVA (PEAK VEL): 2.24 CM2
EJECTION FRACTION APICAL 4 CHAMBER: 60.5
EJECTION FRACTION: 62 %
LEFT ATRIUM VOLUME AREA LENGTH INDEX BSA: 21 ML/M2
LEFT VENTRICLE INTERNAL DIMENSION DIASTOLE: 3.27 CM (ref 3.5–6)
LEFT VENTRICULAR OUTFLOW TRACT DIAMETER: 1.9 CM
MITRAL VALVE E/A RATIO: 0.69
MITRAL VALVE E/E' RATIO: 6.88
RIGHT VENTRICLE FREE WALL PEAK S': 11 CM/S
TRICUSPID ANNULAR PLANE SYSTOLIC EXCURSION: 1.9 CM

## 2024-03-18 PROCEDURE — 93306 TTE W/DOPPLER COMPLETE: CPT | Performed by: STUDENT IN AN ORGANIZED HEALTH CARE EDUCATION/TRAINING PROGRAM

## 2024-03-18 PROCEDURE — 93306 TTE W/DOPPLER COMPLETE: CPT

## 2024-03-20 ENCOUNTER — APPOINTMENT (OUTPATIENT)
Dept: PRIMARY CARE | Facility: CLINIC | Age: 69
End: 2024-03-20
Payer: COMMERCIAL

## 2024-03-20 ENCOUNTER — APPOINTMENT (OUTPATIENT)
Dept: PREADMISSION TESTING | Facility: HOSPITAL | Age: 69
End: 2024-03-20
Payer: COMMERCIAL

## 2024-04-09 ENCOUNTER — OFFICE VISIT (OUTPATIENT)
Dept: PRIMARY CARE | Facility: CLINIC | Age: 69
End: 2024-04-09
Payer: COMMERCIAL

## 2024-04-09 VITALS
BODY MASS INDEX: 32.66 KG/M2 | OXYGEN SATURATION: 96 % | RESPIRATION RATE: 20 BRPM | HEART RATE: 86 BPM | SYSTOLIC BLOOD PRESSURE: 127 MMHG | DIASTOLIC BLOOD PRESSURE: 81 MMHG | HEIGHT: 59 IN | WEIGHT: 162 LBS

## 2024-04-09 DIAGNOSIS — E78.00 PURE HYPERCHOLESTEROLEMIA: ICD-10-CM

## 2024-04-09 DIAGNOSIS — E11.9 TYPE 2 DIABETES MELLITUS WITHOUT COMPLICATION, WITHOUT LONG-TERM CURRENT USE OF INSULIN (MULTI): ICD-10-CM

## 2024-04-09 DIAGNOSIS — E55.9 VITAMIN D DEFICIENCY: ICD-10-CM

## 2024-04-09 DIAGNOSIS — Z00.00 HEALTHCARE MAINTENANCE: Primary | ICD-10-CM

## 2024-04-09 DIAGNOSIS — E03.9 HYPOTHYROIDISM, UNSPECIFIED TYPE: ICD-10-CM

## 2024-04-09 PROCEDURE — 3079F DIAST BP 80-89 MM HG: CPT

## 2024-04-09 PROCEDURE — 3008F BODY MASS INDEX DOCD: CPT

## 2024-04-09 PROCEDURE — 1157F ADVNC CARE PLAN IN RCRD: CPT

## 2024-04-09 PROCEDURE — 1159F MED LIST DOCD IN RCRD: CPT

## 2024-04-09 PROCEDURE — 1036F TOBACCO NON-USER: CPT

## 2024-04-09 PROCEDURE — 3074F SYST BP LT 130 MM HG: CPT

## 2024-04-09 PROCEDURE — 1160F RVW MEDS BY RX/DR IN RCRD: CPT

## 2024-04-09 PROCEDURE — 99397 PER PM REEVAL EST PAT 65+ YR: CPT

## 2024-04-09 RX ORDER — LEVOTHYROXINE SODIUM 75 UG/1
75 TABLET ORAL DAILY
Qty: 90 TABLET | Refills: 3 | Status: SHIPPED
Start: 2024-04-09 | End: 2024-04-25 | Stop reason: SDUPTHER

## 2024-04-09 ASSESSMENT — ENCOUNTER SYMPTOMS
MUSCULOSKELETAL NEGATIVE: 1
BACK PAIN: 0
LIGHT-HEADEDNESS: 0
DIZZINESS: 0
WEAKNESS: 0
CHEST TIGHTNESS: 0
RHINORRHEA: 0
STRIDOR: 0
FLANK PAIN: 0
POLYDIPSIA: 0
ANAL BLEEDING: 0
CHILLS: 0
BRUISES/BLEEDS EASILY: 0
FEVER: 0
COLOR CHANGE: 0
SPEECH DIFFICULTY: 0
ACTIVITY CHANGE: 0
NUMBNESS: 0
SEIZURES: 0
SINUS PRESSURE: 0
NAUSEA: 0
BLOOD IN STOOL: 0
COUGH: 0
POLYPHAGIA: 0
DIARRHEA: 0
HEADACHES: 0
RECTAL PAIN: 0
FATIGUE: 0
DIAPHORESIS: 0
CONSTIPATION: 0
SORE THROAT: 0
HEMATOLOGIC/LYMPHATIC NEGATIVE: 1
NERVOUS/ANXIOUS: 0
DIFFICULTY URINATING: 0
TROUBLE SWALLOWING: 0
PHOTOPHOBIA: 0
MYALGIAS: 0
FREQUENCY: 0
GASTROINTESTINAL NEGATIVE: 1
TREMORS: 0
VOICE CHANGE: 0
CARDIOVASCULAR NEGATIVE: 1
EYE DISCHARGE: 0
NECK PAIN: 0
ENDOCRINE NEGATIVE: 1
CONSTITUTIONAL NEGATIVE: 1
UNEXPECTED WEIGHT CHANGE: 0
ABDOMINAL DISTENTION: 0
RESPIRATORY NEGATIVE: 1
NEUROLOGICAL NEGATIVE: 1
ABDOMINAL PAIN: 0
JOINT SWELLING: 0
HYPERACTIVE: 0
SHORTNESS OF BREATH: 0
HEMATURIA: 0
AGITATION: 0
NECK STIFFNESS: 0
EYES NEGATIVE: 1
PALPITATIONS: 0
PSYCHIATRIC NEGATIVE: 1
APPETITE CHANGE: 0
APNEA: 0
CONFUSION: 0
DYSPHORIC MOOD: 0
WHEEZING: 0
SLEEP DISTURBANCE: 0
DYSURIA: 0

## 2024-04-09 NOTE — PROGRESS NOTES
Primary Care Provider: Malik Draper MD    Subjective   Susan Rowan is a 68 y.o. female who presents for CPE.    CPE    Had blood work 3/2024 with work  DM II: on metformin for about 1 month now, A1c was 6.8%  Hypothyroidism: Low TSH 0.181; no medication changes were made  Normal blood counts and normal liver and kidney function    Follows with someone who is in alternative medicine and someone at [facility]    Had an UTI a few months ago was given a abx    Was taking a diet pill- she stopped this     HM:  Exercising Mon, Tues, Thurs- group aerobic exercise classes 30min   Diabetic diet  Eye exam UTD  Dental exams q6mo  Mammogram due in June 2024  Colonoscopy Oct. 2022- 5 year repeat- due Oct. 2027  Pap testing with OB/GYN  Immunizations: received Shingrix; declines PNA vaccine and COVID & flu vaccines    No chest pain, no SOB         Review of Systems   Constitutional: Negative.  Negative for activity change, appetite change, chills, diaphoresis, fatigue, fever and unexpected weight change.   HENT: Negative.  Negative for congestion, dental problem, ear discharge, ear pain, hearing loss, mouth sores, nosebleeds, postnasal drip, rhinorrhea, sinus pressure, sneezing, sore throat, tinnitus, trouble swallowing and voice change.    Eyes: Negative.  Negative for photophobia, discharge and visual disturbance.   Respiratory: Negative.  Negative for apnea, cough, chest tightness, shortness of breath, wheezing and stridor.    Cardiovascular: Negative.  Negative for chest pain, palpitations and leg swelling.   Gastrointestinal: Negative.  Negative for abdominal distention, abdominal pain, anal bleeding, blood in stool, constipation, diarrhea, nausea and rectal pain.   Endocrine: Negative.  Negative for cold intolerance, heat intolerance, polydipsia, polyphagia and polyuria.   Genitourinary: Negative.  Negative for decreased urine volume, difficulty urinating, dysuria, flank pain, frequency, hematuria and  "urgency.   Musculoskeletal: Negative.  Negative for back pain, gait problem, joint swelling, myalgias, neck pain and neck stiffness.   Skin: Negative.  Negative for color change and rash.   Neurological: Negative.  Negative for dizziness, tremors, seizures, syncope, speech difficulty, weakness, light-headedness, numbness and headaches.   Hematological: Negative.  Does not bruise/bleed easily.   Psychiatric/Behavioral: Negative.  Negative for agitation, confusion, dysphoric mood, sleep disturbance and suicidal ideas. The patient is not nervous/anxious and is not hyperactive.    All other systems reviewed and are negative.        Objective   /81   Pulse 86   Resp 20   Ht 1.499 m (4' 11\")   Wt 73.5 kg (162 lb)   SpO2 96%   BMI 32.72 kg/m²     Physical Exam  Vitals reviewed.   Constitutional:       General: She is not in acute distress.     Appearance: Normal appearance. She is normal weight. She is not ill-appearing, toxic-appearing or diaphoretic.   HENT:      Head: Normocephalic and atraumatic.      Right Ear: Tympanic membrane, ear canal and external ear normal. There is no impacted cerumen.      Left Ear: Tympanic membrane, ear canal and external ear normal. There is no impacted cerumen.      Nose: Nose normal.   Eyes:      Conjunctiva/sclera: Conjunctivae normal.   Cardiovascular:      Rate and Rhythm: Normal rate and regular rhythm.      Pulses: Normal pulses.      Heart sounds: Normal heart sounds. No murmur heard.     No friction rub. No gallop.   Pulmonary:      Effort: Pulmonary effort is normal. No respiratory distress.      Breath sounds: Normal breath sounds.   Abdominal:      General: Abdomen is flat. Bowel sounds are normal.      Palpations: Abdomen is soft.   Musculoskeletal:         General: Normal range of motion.      Cervical back: Normal range of motion and neck supple.   Skin:     General: Skin is warm and dry.      Capillary Refill: Capillary refill takes less than 2 seconds. "   Neurological:      General: No focal deficit present.      Mental Status: She is alert and oriented to person, place, and time. Mental status is at baseline.   Psychiatric:         Mood and Affect: Mood normal.         Behavior: Behavior normal.         Thought Content: Thought content normal.         Judgment: Judgment normal.         Assessment/Plan   Problem List Items Addressed This Visit    FUV   Hyperlipidemia    stable  Relevant Orders    Lipid Panel    Hemoglobin A1C    Hypothyroid    Low TSH 0.181; no medication changes were made- decrease Synthroid from 88mcg to 75mcg  Relevant Medications    levothyroxine (Synthroid, Levoxyl) 75 mcg tablet    Vitamin D deficiency    stable  Relevant Orders    Vitamin D 25-Hydroxy,Total (for eval of Vitamin D levels)    BMI 32.0-32.9,adult    Persisting   Relevant Orders    Lipid Panel    Hemoglobin A1C  Work on healthier eating and exercising     Healthcare maintenance - Primary    Relevant Orders    Lipid Panel    Hemoglobin A1C    Vitamin D 25-Hydroxy,Total (for eval of Vitamin D levels)  HM:  Eye exam UTD  Dental exams q6mo  Mammogram due in June 2024  Colonoscopy Oct. 2022- 5 year repeat- due Oct. 2027  Pap testing with OB/GYN  Immunizations: received Shingrix; declines PNA vaccine and COVID & flu vaccines     Other Visit Diagnoses       Type 2 diabetes mellitus without complication, without long-term current use of insulin (CMS/[facility])        Stable; A1c was 6.8%  Relevant Medications    C/w metformin  semaglutide 0.25 mg or 0.5 mg (2 mg/3 mL) pen injector (Start on 4/14/2024)    Other Relevant Orders    Lipid Panel    Hemoglobin A1C          Follow up in 6 months or sooner if needed

## 2024-04-24 NOTE — PROGRESS NOTES
"[facility] [place]   Hypertrophic Cardiomyopathy Center    Primary Care Physician: Malik Draper MD  Primary Cardiologist:  Dr. Mp Andersen  Date of Visit: 04/25/2024  8:00 AM EDT     HPI / Summary:   Susan Rowan is a 68 y.o. female with a family history of hypertrophic cardiomyopathy (mother) who presents as a consult for evaluation of hypertrophic cardiomyopathy.    Her mother has HCM and had an alcohol septal ablation about 25 years ago.  has had an echocardiogram and a cardiac MRI which showed normal LV function and basal septal hypertrophy up to 1.5 cm. No dyspnea, no chest pain. No syncope. She works out 3-4 times per week. She does cardio at least once per week and weights twice per week. She also does barre. These are half hour sessions. She does these at [facility]. No orthostasis. No orthopnea.      still takes care of her parents. Her mother has HCM. 's  had NPH. He is disabled and this places a large caregiver burden on her; however he is getting slightly better.     HCM History  The patient was initially diagnosed in 2022 when her echocardiogram and CMR suggested asymmetric LVH.   Family History of HCM: Mother (had an alcohol septal ablation)  NYHA Class: 1  Wall thickness: 1.5 cm, CMR 2022  EF: 65%  LVOT obstruction: No  ICD or PPM: No  Prior septal reduction therapy: No  Genetic Testing: sent 4/25/24  Parents: Mom has HCM and had an ASA. Also has HLD. Dad has HLD. Both have PPM  Children: Caryl (b 1983) - unclear if heart issues  Siblings: Lizbeth (b 1958) - seeing a cardiologist but supposedly no HCM; Ean (b 1963) - has some thickening of the wall and he got a PPM (for \"irregular rhythm\")    Sudden Cardiac Arrest Risk Factors:   No syncope  No wall thickness above 30 mm.   No apical aneurysm  NSVT by ambulatory monitor - MONITOR PENDING  No EF less than 50%  No LGE by CMR  No family history of SCA in a family member with " HCM  SCA risk: likely low pending Holter    ROS: Relevant review of symptoms of negative unless discussed above.     Problems:   Patient Active Problem List   Diagnosis    Abdominal pain    Abnormal mammogram of both breasts    Anxiety    Asymmetric septal hypertrophy (Multi)    Breast hematoma, resolving    Breast pain    Calcification of left breast on mammography    Cardiac disease    Cervical neuritis    Chest wall pain    Chronic throat clearing    Elevated coronary artery calcium score    Female pelvic pain    Hyperlipidemia    Hypothyroid    Kidney stone    Labial lesion    Lipid disorder    Liver lesion    Mechanical low back pain    Murmur    Nasal dryness    Class 1 obesity due to excess calories with body mass index (BMI) of 31.0 to 31.9 in adult    Ovarian cyst    Reflux laryngitis    Skin mole    Snoring    Urinary frequency    UTI symptoms    Vitamin D deficiency    Vocal cord polyp    Urge incontinence    Recurrent nephrolithiasis    Pelvic floor weakness    OSWALDO (obstructive sleep apnea)    Atrophic vaginitis    BMI 32.0-32.9,adult    Urge urinary incontinence    Healthcare maintenance       Medical History:   Past Medical History:   Diagnosis Date    Encounter for full-term uncomplicated delivery ([facility])      (spontaneous vaginal delivery)    Encounter for screening for malignant neoplasm of colon 2017    Colon cancer screening    Other conditions influencing health status 2018    History of cough    Other specified postprocedural states     History of surgery on arm    Overactive bladder     Pain in unspecified foot 2017    Acute foot pain    Personal history of other diseases of the circulatory system 2020    History of abnormal electrocardiography    Personal history of other diseases of the musculoskeletal system and connective tissue     History of arthritis    Personal history of other diseases of the respiratory system 2018    History of sinusitis    Personal  "history of other diseases of the respiratory system 05/16/2018    History of sore throat    Personal history of other specified conditions 06/13/2019    History of urinary urgency    Personal history of urinary calculi     History of renal calculi    Pure hypercholesterolemia, unspecified     High cholesterol    Varicella without complication     Varicella without complication       Surgical Hx:   Past Surgical History:   Procedure Laterality Date    OTHER SURGICAL HISTORY  03/06/2020    Elbow surgery    OTHER SURGICAL HISTORY  03/22/2017    Repair Of Humerus / Arm        Family Hx:   Family History   Problem Relation Name Age of Onset    Arthritis Mother      Other (CARDIAC DISORDER) Mother      Other (CVA) Mother      Hyperlipidemia Mother      Osteoporosis Mother      Other (HOCM) Mother      Arthritis Father      Other (CARDIAC DISORDER) Father      Other (CEREBROVASCULAR ACCIDENT) Father      Hyperlipidemia Father      Other (MI) Father      Stroke Father      Hyperlipidemia Sister      Hyperlipidemia Brother      Anxiety disorder Other MAT GM     Depression Other MAT GM     Breast cancer Other MAT AUNT     Other (UTERUS CANCER) Other MAT AUNT    Mother  -has HCM. Had an ASA. Has a \"pacemaker\"  Father  -HLD, \"PPM\"  Kids  Cayrl (b 1983, tough relationship) - has two kids Gail and Kvng    Social Hx:  Fun: likes to work out. Loves going to restaurants. Likes [place], [place], [place]. She cooks 99% of the time.   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   EtOH/Smoking/Drugs: No smoking, rare alcohol, no drugs    Medications  Current Outpatient Medications   Medication Instructions    aspirin 81 mg EC tablet 1 tablet, oral, Daily    baclofen (LIORESAL) 10 mg, oral, 3 times daily    busPIRone (BUSPAR) 20 mg, oral, 2 times daily    cholecalciferol (Vitamin D-3) 25 MCG (1000 UT) capsule oral    ezetimibe (ZETIA) 10 mg, oral, Daily    FLUoxetine (PROZAC) 20 mg, oral, Daily    levothyroxine " "(SYNTHROID, LEVOXYL) 75 mcg, oral, Daily, Take 1 tablet daily and double on sundays    metFORMIN  mg 24 hr tablet     omega-3 fatty acids-fish oil 340-1,000 mg capsule 1 capsule, oral, Every evening    OneTouch Delica Plus Lancet 33 gauge misc     OneTouch Verio Reflect Meter misc USE TO TEST BLOOD SUGAR AS DIRECTED    potassium citrate CR (Urocit-K-10) 10 mEq ER tablet 10 mEq, oral, 2 times daily with meals, Do not crush, chew, or split.    rosuvastatin (CRESTOR) 20 mg, oral, Daily    semaglutide 0.25 mg, subcutaneous, Once Weekly       Allergies  Vancomycin, Cephalexin, Penicillins, and Tolterodine    Exam:   Vitals: /72 (BP Location: Right arm, Patient Position: Sitting, BP Cuff Size: Large adult)   Pulse 81   Ht 1.499 m (4' 11\")   Wt 74.1 kg (163 lb 4.8 oz)   SpO2 95%   BMI 32.98 kg/m²   Wt Readings from Last 5 Encounters:   04/25/24 74.1 kg (163 lb 4.8 oz)   04/09/24 73.5 kg (162 lb)   03/11/24 71.9 kg (158 lb 9 oz)   03/04/24 72.2 kg (159 lb 3.2 oz)   02/15/24 72.6 kg (160 lb)     GEN: Pleasant, well-appearing, no acute distress.  HEENT: JVP not elevated  CHEST: Clear to auscultation, No wheeze, good air movement.  CV: normal rate, regular rhythm, no murmurs or rubs at rest, with valsalva or with squat to stand  EXT: Warm, well perfused, No LE edema.   NEURO: grossly non focal  SKIN: No obvious rashes     Labs:   Lipids  Lab Results   Component Value Date    CHOL 153 05/21/2022    CHOL 153 06/13/2020    CHOL 186 06/13/2019     Lab Results   Component Value Date    HDL 62.2 05/21/2022    HDL 62.8 06/13/2020    HDL 66.8 06/13/2019     No results found for: \"LDLCALC\"  Lab Results   Component Value Date    TRIG 108 05/21/2022    TRIG 128 06/13/2020    TRIG 91 06/13/2019     No components found for: \"CHOLHDL\"    Hemoglobin A1C  Lab Results   Component Value Date    HGBA1C 6.2 06/13/2020       BMP  Lab Results   Component Value Date    GLUCOSE 80 03/07/2023    CALCIUM 9.8 03/07/2023     " "03/07/2023    K 4.4 04/06/2023    CO2 26 03/07/2023     03/07/2023    BUN 17 03/07/2023    CREATININE 0.74 03/07/2023         Notable Studies: imaging personally reviewed and summarized by me below  EKG:  -3/11/2024: Normal sinus rhythm, normal ECG, no ST or T wave changes.    Echo:  -3/18/2024: LVEF 65%, asymmetric septal hypertrophy up to 1.4 cm, normal RV size and function, no significant valvular abnormalities, normal aortic root no pericardial effusion.  No systolic anterior motion of the mitral valve or LVOT obstruction at rest or with Valsalva.  -9/9/2022: LVEF 70%, septal wall thickness is moderately increased, no LVOT obstruction, no significant systolic anterior motion of mitral valve.    Ambulatory Rhythm Monitor:   -pending    Cardiac MRI:  -11/14/2022: LVEF 69%, maximal basal septal hypertrophy 1.5 cm, no suggestion of LVOT obstruction or HERB, no LGE.    Cardiac CT:  -2/2017: , LM 7, LAD 8, Lcx 20,     Assessment and Plan  Susan Rowan is a 68 y.o. female with a family history of hypertrophic cardiomyopathy in her mother who presents for HCM evaluation.    Overall,  has a maximal wall thickness of 1.5 cm with basal septal predominance.  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  We discussed this diagnosis explicitly. She seemed somewhat surprised by this though it was discussed in prior visits with Dr. Andersen.     #Symptoms: Fortunately she essentially has no symptoms from HCM.  She has unlimited exercise capacity and is NYHA class I.  On exam she has no suggestion of obstruction at rest, with valsalva or with [id number] cardiac auscultation.    #Family Screening: Mother has HCM and had an alcohol septal ablation in her 60s-70s. Supposedly 's brother has \"thickening\" and has a \"PPM\".  We will do genetic testing today, the out-of-pocket cost would be a maximum of $250 reportedly.  She is okay " "with this and eager to pursue.  We recommended that all of her first-degree relatives be screened for HCM explicitly.  She has a daughter Caryl who is approximately 40 years old.  's brother supposedly has \"thickening\" and \"pacemaker\".  I asked her to send me his cardiac information.    #SCA risk: We will do a Holter monitor today for 7 days to assess for subclinical ventricular arrhythmias or undetected atrial fibrillation.  She does not have any other high risk features for sudden cardiac death from hypertrophic cardiomyopathy.  With the currently available information, she is likely low risk for sudden cardiac death.    I will call her with the results of her Holter and genetic testing.     Follow up in 1 year for HCM re-evaluation. She will follow up with Dr. Andersen longitudinally.     Curt Rodriguez MD  Director, Sports Cardiology  Hypertrophic Cardiomyopathy Center    Part of this note was completed using dictation and voice recognition software. Please excuse minor errors and typos.      Time Spent: I spent 45 minutes reviewing medical testing, obtaining medical history and counselling and educating on diagnosis and documenting clinical encounter.   "

## 2024-04-25 ENCOUNTER — OFFICE VISIT (OUTPATIENT)
Dept: CARDIOLOGY | Facility: CLINIC | Age: 69
End: 2024-04-25
Payer: COMMERCIAL

## 2024-04-25 ENCOUNTER — LAB (OUTPATIENT)
Dept: LAB | Facility: LAB | Age: 69
End: 2024-04-25
Payer: COMMERCIAL

## 2024-04-25 ENCOUNTER — HOSPITAL ENCOUNTER (OUTPATIENT)
Dept: CARDIOLOGY | Facility: CLINIC | Age: 69
Discharge: HOME | End: 2024-04-25
Payer: COMMERCIAL

## 2024-04-25 ENCOUNTER — DOCUMENTATION (OUTPATIENT)
Dept: GENETICS | Facility: CLINIC | Age: 69
End: 2024-04-25

## 2024-04-25 VITALS
SYSTOLIC BLOOD PRESSURE: 122 MMHG | BODY MASS INDEX: 32.92 KG/M2 | DIASTOLIC BLOOD PRESSURE: 72 MMHG | OXYGEN SATURATION: 95 % | HEART RATE: 81 BPM | WEIGHT: 163.3 LBS | HEIGHT: 59 IN

## 2024-04-25 DIAGNOSIS — I42.2 ASYMMETRIC SEPTAL HYPERTROPHY (MULTI): ICD-10-CM

## 2024-04-25 DIAGNOSIS — I42.2 HYPERTROPHIC CARDIOMYOPATHY (MULTI): ICD-10-CM

## 2024-04-25 DIAGNOSIS — E03.9 HYPOTHYROIDISM, UNSPECIFIED TYPE: ICD-10-CM

## 2024-04-25 DIAGNOSIS — I42.2 HYPERTROPHIC CARDIOMYOPATHY (MULTI): Primary | ICD-10-CM

## 2024-04-25 DIAGNOSIS — R00.2 PALPITATIONS: ICD-10-CM

## 2024-04-25 DIAGNOSIS — E78.00 PURE HYPERCHOLESTEROLEMIA: ICD-10-CM

## 2024-04-25 PROCEDURE — 1160F RVW MEDS BY RX/DR IN RCRD: CPT | Performed by: INTERNAL MEDICINE

## 2024-04-25 PROCEDURE — 93242 EXT ECG>48HR<7D RECORDING: CPT

## 2024-04-25 PROCEDURE — 99214 OFFICE O/P EST MOD 30 MIN: CPT | Performed by: INTERNAL MEDICINE

## 2024-04-25 PROCEDURE — 1036F TOBACCO NON-USER: CPT | Performed by: INTERNAL MEDICINE

## 2024-04-25 PROCEDURE — 93244 EXT ECG>48HR<7D REV&INTERPJ: CPT | Performed by: INTERNAL MEDICINE

## 2024-04-25 PROCEDURE — 3008F BODY MASS INDEX DOCD: CPT | Performed by: INTERNAL MEDICINE

## 2024-04-25 PROCEDURE — 1157F ADVNC CARE PLAN IN RCRD: CPT | Performed by: INTERNAL MEDICINE

## 2024-04-25 PROCEDURE — 1126F AMNT PAIN NOTED NONE PRSNT: CPT | Performed by: INTERNAL MEDICINE

## 2024-04-25 PROCEDURE — 1159F MED LIST DOCD IN RCRD: CPT | Performed by: INTERNAL MEDICINE

## 2024-04-25 PROCEDURE — 36415 COLL VENOUS BLD VENIPUNCTURE: CPT

## 2024-04-25 RX ORDER — LEVOTHYROXINE SODIUM 75 UG/1
75 TABLET ORAL DAILY
Qty: 90 TABLET | Refills: 3 | Status: SHIPPED | OUTPATIENT
Start: 2024-04-25 | End: 2025-04-25

## 2024-04-25 RX ORDER — LANCETS 33 GAUGE
EACH MISCELLANEOUS
COMMUNITY
Start: 2024-04-16

## 2024-04-25 RX ORDER — LANCETS 30 GAUGE
EACH MISCELLANEOUS
COMMUNITY
Start: 2024-04-18

## 2024-04-25 ASSESSMENT — ENCOUNTER SYMPTOMS
DEPRESSION: 0
OCCASIONAL FEELINGS OF UNSTEADINESS: 0
LOSS OF SENSATION IN FEET: 0

## 2024-04-25 ASSESSMENT — PAIN SCALES - GENERAL: PAINLEVEL: 0-NO PAIN

## 2024-04-25 NOTE — PATIENT INSTRUCTIONS
"You have HCM. Your risk for sudden death is likely low. We will do a Holter monitor to assess for arrhythmias. We will also have you do genetic testing to see if there is a mutation for your abnormal heart wall thickness (HCM). I should see you yearly for HCM but Dr. Andersen will still be your primary cardiologist. Please make sure that your family is screened with echocardiograms and we will determine whether you have a mutation and whether they would benefit from genetic testing as well. Please send me your brother's information. If he has \"thickening\" and a pacemaker, I am suspicious he may have HCM.   "

## 2024-04-25 NOTE — Clinical Note
Mp and Malik - I saw  in the [facility] today for consultation. Thank you for referring her. My full note is attached.

## 2024-04-25 NOTE — PROGRESS NOTES
Subjective   Patient ID: Susan Rowan is a 68 y.o. female who presents for cardiac genetic counseling due to a personal and family history of hypertrophic cardiomyopathy. We reviewed the hereditary aspects of this condition, and the genetic testing options available to her.    Background Information: In 2022, 's PCP detected a heart murmur, and ordered an echocardiogram, which showed septal hypertrophy. Cardiac MRI confirmed a diagnosis of hypertrophic cardiomyopathy.    Family History:  A three generation pedigree was collected, and was concerning for the following:    -Brother and mother diagnosed with hypertrophic cardiomyopathy.    Susan Rowan is of [place] descent.  Consanguinity and Ashkenazi Yarsani ancestry was denied.    The rest of the family history was negative for intellectual disability, autism, birth defects, multiple miscarriages, early onset cancer or kidney concerns, and other hereditary conditions.    Assessment/Plan   Genetic Test Ordered: yes    Genetic Counseling:  Hypertrophic cardiomyopathy (HCM) is a condition in which the muscles of the left ventricle thicken, and is most commonly due to a mutation of one of the genes currently known to encode different components of the sarcomere and is inherited in an autosomal dominant manner. For individuals with a family history of HCM, a pathogenic mutation (known harmful gene change) in one of the sarcomere genes can be found in 50-60% of individuals. For individuals without a family history (simplex case), a pathogenic mutation can be found in around 20-30% of individuals.    The most common genes in which mutations are found in individuals with HCM are MYH7, MYBPC3, TNNT2, and TNNI3, however, mutations in many other genes have been found in individuals with HCM. Some individuals with HCM will actually have more than one mutation (either 2 mutations in the same gene, or 2 mutations in different genes). Because of the genetic heterogeneity  seen in HCM, a broad panel approach to genetic testing is recommended.    We reviewed options for testing, specifically discussing a hypertrophic cardiomyopathy panel through [facility] that would examine 30 genes associated with the condition. We also reviewed the various possible test results, including positive, negative, and variant of unknown significance (VUS).    After discussion, the patient elected to proceed with genetic testing. Results should be available in 3-4 weeks and will be called out to the patient. Should a likely pathogenic or pathogenic mutation be identified, we will be able to offer family members targeted testing.  If no mutation is identified, cardiac screening for all 1st-degree relatives will be recommended.    Jimena Trammell MS [facility]  Licensed Genetic Counselor  [facility]  [phone]    Total time spent on day of encounter: 22 minutes

## 2024-05-06 ENCOUNTER — PATIENT MESSAGE (OUTPATIENT)
Dept: PRIMARY CARE | Facility: CLINIC | Age: 69
End: 2024-05-06
Payer: COMMERCIAL

## 2024-05-06 DIAGNOSIS — R53.83 EXHAUSTION: Primary | ICD-10-CM

## 2024-05-07 ENCOUNTER — OFFICE VISIT (OUTPATIENT)
Dept: PRIMARY CARE | Facility: CLINIC | Age: 69
End: 2024-05-07
Payer: COMMERCIAL

## 2024-05-07 ENCOUNTER — LAB (OUTPATIENT)
Dept: LAB | Facility: LAB | Age: 69
End: 2024-05-07
Payer: COMMERCIAL

## 2024-05-07 VITALS
HEIGHT: 59 IN | BODY MASS INDEX: 32.25 KG/M2 | DIASTOLIC BLOOD PRESSURE: 80 MMHG | SYSTOLIC BLOOD PRESSURE: 136 MMHG | OXYGEN SATURATION: 97 % | HEART RATE: 81 BPM | RESPIRATION RATE: 18 BRPM | WEIGHT: 160 LBS

## 2024-05-07 DIAGNOSIS — R11.11 VOMITING WITHOUT NAUSEA, UNSPECIFIED VOMITING TYPE: ICD-10-CM

## 2024-05-07 DIAGNOSIS — E03.9 HYPOTHYROIDISM, UNSPECIFIED TYPE: ICD-10-CM

## 2024-05-07 DIAGNOSIS — R53.83 EXHAUSTION: ICD-10-CM

## 2024-05-07 DIAGNOSIS — Z78.9 DRUG INTOLERANCE: ICD-10-CM

## 2024-05-07 DIAGNOSIS — R53.83 FATIGUE, UNSPECIFIED TYPE: ICD-10-CM

## 2024-05-07 DIAGNOSIS — Z78.9 DRUG INTOLERANCE: Primary | ICD-10-CM

## 2024-05-07 LAB
ALBUMIN SERPL BCP-MCNC: 4.5 G/DL (ref 3.4–5)
ALP SERPL-CCNC: 73 U/L (ref 33–136)
ALT SERPL W P-5'-P-CCNC: 22 U/L (ref 7–45)
ANION GAP SERPL CALC-SCNC: 14 MMOL/L (ref 10–20)
AST SERPL W P-5'-P-CCNC: 18 U/L (ref 9–39)
BASOPHILS # BLD AUTO: 0.02 X10*3/UL (ref 0–0.1)
BASOPHILS NFR BLD AUTO: 0.3 %
BILIRUB SERPL-MCNC: 0.5 MG/DL (ref 0–1.2)
BUN SERPL-MCNC: 17 MG/DL (ref 6–23)
CALCIUM SERPL-MCNC: 9.9 MG/DL (ref 8.6–10.3)
CHLORIDE SERPL-SCNC: 104 MMOL/L (ref 98–107)
CO2 SERPL-SCNC: 27 MMOL/L (ref 21–32)
CREAT SERPL-MCNC: 0.83 MG/DL (ref 0.5–1.05)
EGFRCR SERPLBLD CKD-EPI 2021: 77 ML/MIN/1.73M*2
EOSINOPHIL # BLD AUTO: 0.12 X10*3/UL (ref 0–0.7)
EOSINOPHIL NFR BLD AUTO: 1.7 %
ERYTHROCYTE [DISTWIDTH] IN BLOOD BY AUTOMATED COUNT: 12.4 % (ref 11.5–14.5)
GLUCOSE SERPL-MCNC: 106 MG/DL (ref 74–99)
HCT VFR BLD AUTO: 43.9 % (ref 36–46)
HGB BLD-MCNC: 14.8 G/DL (ref 12–16)
IMM GRANULOCYTES # BLD AUTO: 0.02 X10*3/UL (ref 0–0.7)
IMM GRANULOCYTES NFR BLD AUTO: 0.3 % (ref 0–0.9)
LYMPHOCYTES # BLD AUTO: 1.71 X10*3/UL (ref 1.2–4.8)
LYMPHOCYTES NFR BLD AUTO: 24.6 %
MAGNESIUM SERPL-MCNC: 2.1 MG/DL (ref 1.6–2.4)
MCH RBC QN AUTO: 30.6 PG (ref 26–34)
MCHC RBC AUTO-ENTMCNC: 33.7 G/DL (ref 32–36)
MCV RBC AUTO: 91 FL (ref 80–100)
MONOCYTES # BLD AUTO: 0.54 X10*3/UL (ref 0.1–1)
MONOCYTES NFR BLD AUTO: 7.8 %
NEUTROPHILS # BLD AUTO: 4.54 X10*3/UL (ref 1.2–7.7)
NEUTROPHILS NFR BLD AUTO: 65.3 %
NRBC BLD-RTO: 0 /100 WBCS (ref 0–0)
PLATELET # BLD AUTO: 276 X10*3/UL (ref 150–450)
POTASSIUM SERPL-SCNC: 4.8 MMOL/L (ref 3.5–5.3)
PROT SERPL-MCNC: 6.5 G/DL (ref 6.4–8.2)
RBC # BLD AUTO: 4.84 X10*6/UL (ref 4–5.2)
SODIUM SERPL-SCNC: 140 MMOL/L (ref 136–145)
VIT B12 SERPL-MCNC: 317 PG/ML (ref 211–911)
WBC # BLD AUTO: 7 X10*3/UL (ref 4.4–11.3)

## 2024-05-07 PROCEDURE — 1160F RVW MEDS BY RX/DR IN RCRD: CPT

## 2024-05-07 PROCEDURE — 80053 COMPREHEN METABOLIC PANEL: CPT

## 2024-05-07 PROCEDURE — 93000 ELECTROCARDIOGRAM COMPLETE: CPT

## 2024-05-07 PROCEDURE — 99214 OFFICE O/P EST MOD 30 MIN: CPT

## 2024-05-07 PROCEDURE — 82607 VITAMIN B-12: CPT

## 2024-05-07 PROCEDURE — 1157F ADVNC CARE PLAN IN RCRD: CPT

## 2024-05-07 PROCEDURE — 83735 ASSAY OF MAGNESIUM: CPT

## 2024-05-07 PROCEDURE — 3008F BODY MASS INDEX DOCD: CPT

## 2024-05-07 PROCEDURE — 1159F MED LIST DOCD IN RCRD: CPT

## 2024-05-07 PROCEDURE — 36415 COLL VENOUS BLD VENIPUNCTURE: CPT

## 2024-05-07 PROCEDURE — 85025 COMPLETE CBC W/AUTO DIFF WBC: CPT

## 2024-05-07 ASSESSMENT — ENCOUNTER SYMPTOMS
DIZZINESS: 0
APNEA: 0
FATIGUE: 1
MYALGIAS: 1
RESPIRATORY NEGATIVE: 1
RHINORRHEA: 0
BRUISES/BLEEDS EASILY: 0
NECK PAIN: 0
COUGH: 0
WEAKNESS: 0
CARDIOVASCULAR NEGATIVE: 1
CONSTIPATION: 0
ABDOMINAL DISTENTION: 0
ENDOCRINE NEGATIVE: 1
CONFUSION: 0
DIAPHORESIS: 0
PALPITATIONS: 0
HEADACHES: 0
CHEST TIGHTNESS: 0
SPEECH DIFFICULTY: 0
HEMATURIA: 0
NERVOUS/ANXIOUS: 0
DYSPHORIC MOOD: 0
CHILLS: 0
FREQUENCY: 0
APPETITE CHANGE: 0
SHORTNESS OF BREATH: 0
NUMBNESS: 0
FLANK PAIN: 0
VOMITING: 1
TREMORS: 0
DIARRHEA: 0
POLYPHAGIA: 0
WHEEZING: 0
COLOR CHANGE: 0
JOINT SWELLING: 0
EYE DISCHARGE: 0
LIGHT-HEADEDNESS: 0
HEMATOLOGIC/LYMPHATIC NEGATIVE: 1
BACK PAIN: 0
SORE THROAT: 0
FEVER: 0
PSYCHIATRIC NEGATIVE: 1
STRIDOR: 0
NAUSEA: 0
ABDOMINAL PAIN: 0
SINUS PRESSURE: 0
AGITATION: 0
ANAL BLEEDING: 0
VOICE CHANGE: 0
UNEXPECTED WEIGHT CHANGE: 0
SLEEP DISTURBANCE: 0
SEIZURES: 0
RECTAL PAIN: 0
DIFFICULTY URINATING: 0
NECK STIFFNESS: 0
HYPERACTIVE: 0
ACTIVITY CHANGE: 0
NEUROLOGICAL NEGATIVE: 1
EYES NEGATIVE: 1
TROUBLE SWALLOWING: 0
DYSURIA: 0
BLOOD IN STOOL: 0
PHOTOPHOBIA: 0
POLYDIPSIA: 0

## 2024-05-07 NOTE — PROGRESS NOTES
Primary Care Provider: Malik Draper MD    Subjective   Susan Rowan is a 68 y.o. female who presents for Follow-up (Stopped taking metformin /Feels exhaustion/ ).    Feeling exhausted- started yesterday  Other associated symptoms: Myalgia, severe fatigue, vomiting  Vomited once today; symptoms worse after taking Ozempic   Symptoms started yesterday/ today  No fevers, no chills, no chest pain, no SOB, no dizziness, no racing HR, no back pain  She stopped her metformin 2 weeks ago- based on the guidance of a provider where she works at [employer] (has a PCP at [employer] as well)  Has been on lower dose of Synthroid for 1 week now  Sundays take Ozempic- has taken 2 doses now  IO today BS 79           Review of Systems   Constitutional:  Positive for fatigue. Negative for activity change, appetite change, chills, diaphoresis, fever and unexpected weight change.   HENT: Negative.  Negative for congestion, dental problem, ear discharge, ear pain, hearing loss, mouth sores, nosebleeds, postnasal drip, rhinorrhea, sinus pressure, sneezing, sore throat, tinnitus, trouble swallowing and voice change.    Eyes: Negative.  Negative for photophobia, discharge and visual disturbance.   Respiratory: Negative.  Negative for apnea, cough, chest tightness, shortness of breath, wheezing and stridor.    Cardiovascular: Negative.  Negative for chest pain, palpitations and leg swelling.   Gastrointestinal:  Positive for vomiting. Negative for abdominal distention, abdominal pain, anal bleeding, blood in stool, constipation, diarrhea, nausea and rectal pain.   Endocrine: Negative.  Negative for cold intolerance, heat intolerance, polydipsia, polyphagia and polyuria.   Genitourinary: Negative.  Negative for decreased urine volume, difficulty urinating, dysuria, flank pain, frequency, hematuria and urgency.   Musculoskeletal:  Positive for myalgias. Negative for back pain, gait problem, joint swelling, neck pain and neck  "stiffness.   Skin: Negative.  Negative for color change and rash.   Neurological: Negative.  Negative for dizziness, tremors, seizures, syncope, speech difficulty, weakness, light-headedness, numbness and headaches.   Hematological: Negative.  Does not bruise/bleed easily.   Psychiatric/Behavioral: Negative.  Negative for agitation, confusion, dysphoric mood, sleep disturbance and suicidal ideas. The patient is not nervous/anxious and is not hyperactive.    All other systems reviewed and are negative.        Objective   /80   Pulse 81   Resp 18   Ht 1.499 m (4' 11\")   Wt 72.6 kg (160 lb)   SpO2 97%   BMI 32.32 kg/m²     Physical Exam  Vitals reviewed.   Constitutional:       General: She is not in acute distress.     Appearance: Normal appearance. She is normal weight. She is not ill-appearing, toxic-appearing or diaphoretic.   HENT:      Head: Normocephalic and atraumatic.      Nose: Nose normal.   Eyes:      Conjunctiva/sclera: Conjunctivae normal.   Cardiovascular:      Rate and Rhythm: Normal rate and regular rhythm.      Pulses: Normal pulses.      Heart sounds: Normal heart sounds. No murmur heard.     No friction rub. No gallop.   Pulmonary:      Effort: Pulmonary effort is normal. No respiratory distress.      Breath sounds: Normal breath sounds.   Abdominal:      General: Abdomen is flat. Bowel sounds are normal.      Palpations: Abdomen is soft.   Musculoskeletal:         General: Normal range of motion.      Cervical back: Normal range of motion and neck supple.   Lymphadenopathy:      Cervical: No cervical adenopathy.   Skin:     General: Skin is warm and dry.      Capillary Refill: Capillary refill takes less than 2 seconds.   Neurological:      General: No focal deficit present.      Mental Status: She is alert and oriented to person, place, and time. Mental status is at baseline.   Psychiatric:         Mood and Affect: Mood normal.         Behavior: Behavior normal.         Thought " Content: Thought content normal.         Judgment: Judgment normal.         Assessment/Plan   Problem List Items Addressed This Visit       Hypothyroid    C/w levothyroxine as rx  Relevant Orders    Vitamin B12 (Completed)    CBC and Auto Differential     Other Visit Diagnoses       Drug intolerance    -  Primary    Relevant Orders    Vitamin B12 (Completed)    CBC and Auto Differential    Fatigue, unspecified type        Relevant Orders    Vitamin B12 (Completed)    ECG 12 lead (Clinic Performed)    CBC and Auto Differential    Vomiting without nausea, unspecified vomiting type        Relevant Orders    Vitamin B12 (Completed)    CBC and Auto Differential     Symptoms most likely related to starting Ozempic.  Stop Ozempic immediately can restart metformin in 1 week   In office blood sugar today 79

## 2024-05-09 LAB — SCAN RESULT: NORMAL

## 2024-05-10 LAB — BODY SURFACE AREA: 1.76 M2

## 2024-05-10 NOTE — RESULT ENCOUNTER NOTE
There is a variant of uncertain significance in SOS1 p.L6P. As such, will continue with plan for first degree relatives to be screened with echo and EKG.     Her brother does not have HCM according to the medical records she forwarded.

## 2024-05-10 NOTE — RESULT ENCOUNTER NOTE
Holter showed max , min HR 57 and average HR 86. <1% PAC and PVC burden. 8 short episodes of SVT (fastest 157, longest 19 beats). 0 patient triggers.     Voicemail left for  with the results.

## 2024-05-20 ENCOUNTER — TELEPHONE (OUTPATIENT)
Dept: CARDIOLOGY | Facility: CLINIC | Age: 69
End: 2024-05-20
Payer: COMMERCIAL

## 2024-05-20 ENCOUNTER — PATIENT MESSAGE (OUTPATIENT)
Dept: PRIMARY CARE | Facility: CLINIC | Age: 69
End: 2024-05-20

## 2024-05-20 DIAGNOSIS — E78.5 HYPERLIPIDEMIA, UNSPECIFIED HYPERLIPIDEMIA TYPE: ICD-10-CM

## 2024-05-20 NOTE — TELEPHONE ENCOUNTER
----- Message from Susan Rowan sent at 5/20/2024  2:56 PM EDT -----  Regarding: Rx refill for Ezetimibe   Contact: [phone]  Sol Andersen,  [facility] pharmacy will be contacting you to ask for a refill RX for Ezetimbe for me.  Please keep a look out for this request.    Thank you in advance!  Stay well!

## 2024-05-21 DIAGNOSIS — E03.9 HYPOTHYROIDISM, UNSPECIFIED TYPE: Primary | ICD-10-CM

## 2024-05-21 DIAGNOSIS — F41.9 ANXIETY: ICD-10-CM

## 2024-05-21 RX ORDER — FLUOXETINE HYDROCHLORIDE 20 MG/1
20 CAPSULE ORAL DAILY
Qty: 90 CAPSULE | Refills: 0 | Status: SHIPPED | OUTPATIENT
Start: 2024-05-21 | End: 2024-08-19

## 2024-05-21 RX ORDER — BUSPIRONE HYDROCHLORIDE 10 MG/1
20 TABLET ORAL 2 TIMES DAILY
Qty: 360 TABLET | Refills: 3 | Status: SHIPPED | OUTPATIENT
Start: 2024-05-21 | End: 2025-05-21

## 2024-05-21 RX ORDER — EZETIMIBE 10 MG/1
10 TABLET ORAL DAILY
Qty: 90 TABLET | Refills: 3 | Status: SHIPPED | OUTPATIENT
Start: 2024-05-21 | End: 2025-05-21

## 2024-05-21 NOTE — TELEPHONE ENCOUNTER
----- Message from Pilar Nelson MA sent at 5/21/2024 10:53 AM EDT -----  Regarding: FW: Feeling extremely exhausted  Contact: [phone]    ----- Message -----  From: Susan Rowan  Sent: 5/20/2024   1:36 PM EDT  To:  [id number] [place] [id number] Clinical Support Staff  Subject: Feeling extremely exhausted                      Hello, I am still feeling extremely exhausted.  I can barely keep my eyes open after 8 hours of good sleep.  I am tired driving to work so soon after I get up.  I cannot understand why I am so tired.  I am worried about it.  Could it be the reduction in my levothyroxin ?

## 2024-05-21 NOTE — TELEPHONE ENCOUNTER
Called patient and LVM- would like her to call me to go over her symptoms in greater detail and how she is feeling. RF medications. Requested she go to the lab for a TSH level as well.

## 2024-05-23 ENCOUNTER — TELEPHONE (OUTPATIENT)
Dept: PRIMARY CARE | Facility: CLINIC | Age: 69
End: 2024-05-23
Payer: COMMERCIAL

## 2024-05-23 NOTE — TELEPHONE ENCOUNTER
----- Message from Pilar Nelson MA sent at 5/21/2024 10:53 AM EDT -----  Regarding: FW: Feeling extremely exhausted  Contact: [phone]    ----- Message -----  From: Susan Rowan  Sent: 5/20/2024   1:36 PM EDT  To:  [id number] [place] [id number] Clinical Support Staff  Subject: Feeling extremely exhausted                      Hello, I am still feeling extremely exhausted.  I can barely keep my eyes open after 8 hours of good sleep.  I am tired driving to work so soon after I get up.  I cannot understand why I am so tired.  I am worried about it.  Could it be the reduction in my levothyroxin ?

## 2024-05-23 NOTE — TELEPHONE ENCOUNTER
Feeling a little bit better, more energy. No chest pain, no SOB. Taking Synthroid 75mcg in the middle of the night-- prior was taking Synthroid 88mcg in the middle of the night and double dose on Sundays. Discussed pt is to take Synthroid 75mcg in the middle of the night and double dose on Sundays. She is going to start this, this week. She will follow up if her symptoms return.

## 2024-06-17 ENCOUNTER — APPOINTMENT (OUTPATIENT)
Dept: RADIOLOGY | Facility: CLINIC | Age: 69
End: 2024-06-17
Payer: COMMERCIAL

## 2024-06-17 ENCOUNTER — HOSPITAL ENCOUNTER (OUTPATIENT)
Dept: RADIOLOGY | Facility: CLINIC | Age: 69
Discharge: HOME | End: 2024-06-17
Payer: COMMERCIAL

## 2024-06-17 DIAGNOSIS — Z13.820 SCREENING FOR OSTEOPOROSIS: ICD-10-CM

## 2024-06-17 DIAGNOSIS — Z78.0 ASYMPTOMATIC POSTMENOPAUSAL STATE: ICD-10-CM

## 2024-06-17 PROCEDURE — 77080 DXA BONE DENSITY AXIAL: CPT | Performed by: RADIOLOGY

## 2024-06-17 PROCEDURE — 77080 DXA BONE DENSITY AXIAL: CPT

## 2024-06-18 ENCOUNTER — HOSPITAL ENCOUNTER (OUTPATIENT)
Dept: RADIOLOGY | Facility: CLINIC | Age: 69
Discharge: HOME | End: 2024-06-18
Payer: COMMERCIAL

## 2024-06-18 VITALS — WEIGHT: 160 LBS | BODY MASS INDEX: 32.32 KG/M2

## 2024-06-18 DIAGNOSIS — R92.8 OTHER ABNORMAL AND INCONCLUSIVE FINDINGS ON DIAGNOSTIC IMAGING OF BREAST: ICD-10-CM

## 2024-06-18 DIAGNOSIS — Z12.31 ENCOUNTER FOR SCREENING MAMMOGRAM FOR BREAST CANCER: Primary | ICD-10-CM

## 2024-06-18 PROCEDURE — 77062 BREAST TOMOSYNTHESIS BI: CPT

## 2024-06-18 PROCEDURE — 77066 DX MAMMO INCL CAD BI: CPT | Performed by: STUDENT IN AN ORGANIZED HEALTH CARE EDUCATION/TRAINING PROGRAM

## 2024-06-18 PROCEDURE — G0279 TOMOSYNTHESIS, MAMMO: HCPCS | Performed by: STUDENT IN AN ORGANIZED HEALTH CARE EDUCATION/TRAINING PROGRAM

## 2024-06-26 ENCOUNTER — TELEPHONE (OUTPATIENT)
Dept: OBSTETRICS AND GYNECOLOGY | Facility: CLINIC | Age: 69
End: 2024-06-26
Payer: COMMERCIAL

## 2024-07-08 ENCOUNTER — APPOINTMENT (OUTPATIENT)
Dept: SLEEP MEDICINE | Facility: CLINIC | Age: 69
End: 2024-07-08
Payer: COMMERCIAL

## 2024-07-08 DIAGNOSIS — R05.3 CHRONIC COUGH: Primary | ICD-10-CM

## 2024-07-08 DIAGNOSIS — G47.33 OSA (OBSTRUCTIVE SLEEP APNEA): Primary | ICD-10-CM

## 2024-07-08 PROCEDURE — 1159F MED LIST DOCD IN RCRD: CPT | Performed by: PHYSICIAN ASSISTANT

## 2024-07-08 PROCEDURE — 99213 OFFICE O/P EST LOW 20 MIN: CPT | Performed by: PHYSICIAN ASSISTANT

## 2024-07-08 PROCEDURE — 3008F BODY MASS INDEX DOCD: CPT | Performed by: PHYSICIAN ASSISTANT

## 2024-07-08 PROCEDURE — 1157F ADVNC CARE PLAN IN RCRD: CPT | Performed by: PHYSICIAN ASSISTANT

## 2024-07-08 NOTE — PATIENT INSTRUCTIONS
[facility] Sleep Medicine  [address]  [facility]  [address] [facility] [address]       NAME: Susan Rowan   DATE: 07/08/24    Your Sleep Provider Today: María Elena Flores PA-C  Your Primary Care Physician: Malik Draper MD   Your Referring Provider: No ref. provider found    DIAGNOSIS:   No diagnosis found.    Thank you for coming to the Sleep Medicine Clinic today! Your sleep medicine provider today was: María Elena Flores PA-C Below is a summary of your treatment plan, other important information, and our contact numbers:      TREATMENT PLAN     If going to run cpap without water; please turn off humidity and temperature.  [facility] phone number: [phone], extension #[phone]    Bring cpap to next office visit with all equipment.       Instructions - Common OSWALDO Recs: - For your sleep apnea, continue to use your PAP every night and use it whenever you are sleeping.   - Avoid alcohol or sedatives several hours prior to sleeping.   - Get additional supplies for your PAP (e.g., mask, hose, filters) every 3 months or as your insurance allows from your [employer] company. Replacement cushions for your PAP mask can be requested monthly if airseals are an issue.  - Remember to clean your mask, tubings, and water chamber regularly as instructed.  - Avoid driving or operating heavy machinery when drowsy. A person driving while sleepy is five (5) times more likely to have an accident. If you feel sleepy, pull over and take a short power nap (sleep for less than 30 minutes). Otherwise, ask somebody to drive you.      Follow-up Appointment:   3-4 months with all equipment       IMPORTANT INFORMATION     Call 911 for medical emergencies.  Our offices are generally open from Monday-Friday, 9 am - 5 pm.  If you need to get in touch with me, you may either call me and my team(number is below) or you can use [employer].  If a referral for a test, for CPAP, or for another specialist was made, and you have  not heard about scheduling this within a week, please call scheduling at [phone]).  If you are unable to make your appointment for clinic or an overnight study, kindly call the office at least 48 hours in advance to cancel and reschedule.  If you are on CPAP, please bring your device's card or the device to each clinic appointment.   There are no supporting services by either the sleep doctors or their staff on weekends and Holidays, or after 5 PM on weekdays.   If you have been asked to come to a sleep study, make sure you bring toiletries, a comfy pillow, and any nighttime medications that you may regularly take. Also be sure to eat dinner before you arrive. We generally do not provide meals.      PRESCRIPTIONS     We require 7 days advanced notice for prescription refills. If we do not receive the request in this time, we cannot guarantee that your medication will be refilled in time.      IMPORTANT PHONE NUMBERS     Sleep Medicine Clinic Fax: [phone]  Appointments (for Adult Sleep Clinic): [phone] - option 2  Appointments (For Sleep Studies): [phone] - option 3  Behavioral Sleep Medicine: [phone]  Sleep Surgery: [phone]  ENT (Otolaryngology): [phone]  Headache Clinic (Neurology): [phone]  Neurology: [phone]  Psychiatry: [phone]  Pulmonary Function Testing (PFT) Center: [phone]  Pulmonary Medicine: [phone]  [facility] (DME): [phone]  [employer] [facility] [employer] (DME): [phone]  [facility] ([facility]): [phone]      OUR ADULT SLEEP MEDICINE TEAM   Please do not hesitate to call the office or sleep nurse with any questions between appointments:    Adult Sleep Nurses (Jimena Alejandre, RN and Sonam Thurman RN):  For clinical questions and refilling prescriptions: [phone]  Email sleep diaries and other documents at: [email or web address]    Adult Sleep Medicine Secretaries:  Carisa Mcguire (For  Vincenzo/Carmen/Mark/Valentina/Quiana/Roland):   P: [phone]  F: [phone]  Yessica Barron (For Cobos/Gulisandroenpaluler): P: [phone]  Fax: [phone]  Xuan Young (For Jurjaviervic/Blank): P: [phone]  F: [phone]  Kari Grossman (For Irene): P: [phone]  F: [phone]  Aida Perkins (For Ana Maria/Ruy/Zakhary): P: [phone]  F: [phone]  Staci Dalton (For Kalin/Ramana): P: [phone]  F: [phone]     Adult Sleep Medicine Advanced Practice Providers:  Derrek Slater ([place], [place])  Jerrica Redmond ([facility])  Natalie Colon CNP ([place], [place], [place])  Audrey Flores CNP ([place], [place], [place])  Armida Bonilla ([place], [place], [place])  Amy Boles CNP ([place], [place])        OUR SLEEP TESTING LOCATIONS     Our team will contact you to schedule your sleep study, however, you can contact us as follow:  Main Phone Line (scheduling only): [phone] ([phone]), option 3  Adult and Pediatric Locations  [facility] (6 years and older): [facility] - 4th floor ([address] [place] [address]) After hours line: [phone]  [facility] (Main campus: All ages): [facility], 6th floor. After hours line: [phone]   [place] (5 years and older; younger considered on case-by-case basis): [address]; [address], [address]. Scheduling  After hours line: [phone]   [place] (6 years and older): [address]; [address]; [address]   [place] (6 years and older): [address] [facility], [address]  After hours line: [phone]   [place] (13 years and older) in [place]: [address], 2nd floor  After hours line: [phone]   [place] (13 year and older): [address] [facility] [address], [address]  After hours line: [phone]     Adult Only Locations:   [place] (18 years and older): [address] [facility], 2nd floor   [place] (18 years and older): [address] [facility]; 4th floor  After hours line: [phone]   [place] (18 years and older) at [place]: [address] [place] [address]  After hours line: [phone]          CONTACTING YOUR SLEEP MEDICINE PROVIDER     Send a message directly to your provider through \"My Chart\", which is the email service through your  Records Account: [email or web address]   Call [phone] and leave a message. One of the administrative assistants will forward the message to your sleep medicine provider through \"My Chart\" and/or email.     Your sleep medicine provider for this visit was: María Elena Flores PA-C    "

## 2024-07-08 NOTE — PROGRESS NOTES
Patient: Susan Rowan    [id number]  : 1955 -- AGE 68 y.o.    Provider: María Elena Flores PA-C     Location [facility]   Service Date: 2024              [facility] Medicine [facility]  Followup Visit Note      Virtual or Telephone Consent    A telephone visit (audio only) between the patient (at the originating site) and the provider (at the distant site) was utilized to provide this telehealth service.   Verbal consent was requested and obtained from Susan Rowan on this date, 24 for a telehealth visit.           HISTORY OF PRESENT ILLNESS     HISTORY OF PRESENT ILLNESS   Susan Rowan is a 68 y.o. female with h/o OSWALDO,obesity, HLD, multiple comorbidities who presents to a [facility] Sleep Medicine Clinic for followup.     PAST SLEEP HISTORY:   2022 - [facility] HSAT - AHI of 35       Assessment and plan from last visit: 3/4/2024--     OSWALDO (obstructive sleep apnea) - Primary G47.33        -adjust pressures to 4-14cwp as rAHI is 8; did not like 7-14 as adjusted at last visit  -will work to increase compliance as she gets new supplies; happy with N30i style mask  -states does not believe she is snoring without pap recently; no significant weight loss or other changes           Relevant Orders     Positive Airway Pressure (PAP) Therapy         Current History    On today's visit, the patient reports that she had difficulty getting supply refill from [facility] and was out of supplies for a while; took her a while to re acclimate. Is working toward getting used to cpap again. Does not snore with use. States sometimes mask is coming off/flipping off over her nose during the night and causing high leaks. Is concerned about her number of events per hour. Does report sometimes water chamber is completely dry in the morning.   Mask style: N30i, does have chin strap.           REVIEW OF SYSTEMS     REVIEW OF SYSTEMS  See HPI; all other ROS were reviewed and negative for  compliant      ALLERGIES AND MEDICATIONS     ALLERGIES  Allergies   Allergen Reactions    Vancomycin Rash    Cephalexin Unknown and Hives    Penicillins Hives and Unknown    Ozempic [Semaglutide] Other     Myalgia, severe fatigue, vomiting    Tolterodine Unknown, GI Upset, Other and Nausea/vomiting       MEDICATIONS: She has a current medication list which includes the following prescription(s): aspirin - Take 1 tablet (81 mg) by mouth once daily, baclofen - Take 1 tablet (10 mg) by mouth 3 times a day, buspirone - Take 2 tablets (20 mg) by mouth 2 times a day, cholecalciferol - Take by mouth, ezetimibe - Take 1 tablet (10 mg) by mouth once daily, fluoxetine - Take 1 capsule (20 mg) by mouth once daily, levothyroxine - Take 1 tablet (75 mcg) by mouth once daily. Take 1 tablet daily and double on sundays, omega-3 fatty acids-fish oil - Take 1 capsule by mouth once daily in the evening, onetouch delica plus lancet, onetouch verio reflect meter - USE TO TEST BLOOD SUGAR AS DIRECTED, potassium citrate cr - Take 1 tablet (10 mEq) by mouth 2 times a day with meals. Do not crush, chew, or split, rosuvastatin - Take 1 tablet (20 mg) by mouth once daily, and metformin xr.    PAST MEDICAL HISTORY : She  has a past medical history of Encounter for full-term uncomplicated delivery ([facility]), Encounter for screening for malignant neoplasm of colon (03/22/2017), Other conditions influencing health status (05/25/2018), Other specified postprocedural states, Overactive bladder, Pain in unspecified foot (09/19/2017), Personal history of other diseases of the circulatory system (03/06/2020), Personal history of other diseases of the musculoskeletal system and connective tissue, Personal history of other diseases of the respiratory system (05/16/2018), Personal history of other diseases of the respiratory system (05/16/2018), Personal history of other specified conditions (06/13/2019), Personal history of urinary calculi, Pure  hypercholesterolemia, unspecified, and Varicella without complication.    PAST SURGICAL HISTORY: She  has a past surgical history that includes Other surgical history (03/06/2020) and Other surgical history (03/22/2017).     FAMILY HISTORY: No changes since previous visit. Otherwise non-contributory as charted.     SOCIAL HISTORY  She  reports that she has never smoked. She has never been exposed to tobacco smoke. She has never used smokeless tobacco. She reports current alcohol use of about 1.0 standard drink of alcohol per week. She reports that she does not use drugs.       PHYSICAL EXAM     VITAL SIGNS: LMP  (LMP Unknown)        PREVIOUS WEIGHTS:  Wt Readings from Last 3 Encounters:   06/18/24 72.6 kg (160 lb)   05/07/24 72.6 kg (160 lb)   04/25/24 74.1 kg (163 lb 4.8 oz)         RESULTS/DATA     Bicarbonate (mmol/L)   Date Value   05/07/2024 27   03/07/2023 26   11/25/2022 26   11/15/2022 27       PAP Adherence  A PAP adherence download was obtained and data was reviewed personally today in clinic.        ASSESSMENT/PLAN     Ms. Rowan is a 68 y.o. female and she returns in followup to the [facility] for OSWALDO.    Problem List, Orders, Assessment, Recommendations:  Problem List Items Addressed This Visit             ICD-10-CM    OSWALDO (obstructive sleep apnea) - Primary G47.33     -rAHI ~9 on DL  -not snoring with device, has otherwise not felt a huge difference since starting PAP   -high leak noted likely contributing to her elevated rAHI  -has chin strap, mask also flipping up at times  -bring to clinic next appointment for troubleshooting/look at mask fit          Relevant Orders    Positive Airway Pressure (PAP) Therapy         Disposition    Return to clinic in 2-3 month - provided number to schedule

## 2024-07-09 NOTE — ASSESSMENT & PLAN NOTE
-rAHI ~9 on DL  -not snoring with device, has otherwise not felt a huge difference since starting PAP   -high leak noted likely contributing to her elevated rAHI  -has chin strap, mask also flipping up at times  -bring to clinic next appointment for troubleshooting/look at mask fit

## 2024-07-12 ENCOUNTER — PATIENT MESSAGE (OUTPATIENT)
Dept: PRIMARY CARE | Facility: CLINIC | Age: 69
End: 2024-07-12
Payer: COMMERCIAL

## 2024-07-12 DIAGNOSIS — Z13.6 ENCOUNTER FOR SCREENING FOR CORONARY ARTERY DISEASE: Primary | ICD-10-CM

## 2024-07-15 RX ORDER — BACLOFEN 10 MG/1
10 TABLET ORAL 3 TIMES DAILY
Qty: 270 TABLET | Refills: 0 | Status: SHIPPED
Start: 2024-07-15 | End: 2024-07-16 | Stop reason: SDUPTHER

## 2024-07-16 DIAGNOSIS — R05.3 CHRONIC COUGH: ICD-10-CM

## 2024-07-17 RX ORDER — BACLOFEN 10 MG/1
10 TABLET ORAL 3 TIMES DAILY
Qty: 270 TABLET | Refills: 2 | Status: SHIPPED | OUTPATIENT
Start: 2024-07-17

## 2024-07-26 ENCOUNTER — PATIENT MESSAGE (OUTPATIENT)
Dept: PRIMARY CARE | Facility: CLINIC | Age: 69
End: 2024-07-26
Payer: COMMERCIAL

## 2024-08-09 ENCOUNTER — LAB (OUTPATIENT)
Dept: LAB | Facility: LAB | Age: 69
End: 2024-08-09
Payer: COMMERCIAL

## 2024-08-09 DIAGNOSIS — E03.9 HYPOTHYROIDISM, UNSPECIFIED TYPE: ICD-10-CM

## 2024-08-09 DIAGNOSIS — E11.9 TYPE 2 DIABETES MELLITUS WITHOUT COMPLICATION, WITHOUT LONG-TERM CURRENT USE OF INSULIN (MULTI): ICD-10-CM

## 2024-08-09 DIAGNOSIS — E55.9 VITAMIN D DEFICIENCY: ICD-10-CM

## 2024-08-09 DIAGNOSIS — Z00.00 HEALTHCARE MAINTENANCE: ICD-10-CM

## 2024-08-09 DIAGNOSIS — E78.00 PURE HYPERCHOLESTEROLEMIA: ICD-10-CM

## 2024-08-09 LAB
25(OH)D3 SERPL-MCNC: 38 NG/ML (ref 30–100)
CHOLEST SERPL-MCNC: 170 MG/DL (ref 0–199)
CHOLESTEROL/HDL RATIO: 3
EST. AVERAGE GLUCOSE BLD GHB EST-MCNC: 140 MG/DL
HBA1C MFR BLD: 6.5 %
HDLC SERPL-MCNC: 57.6 MG/DL
LDLC SERPL CALC-MCNC: 84 MG/DL
NON HDL CHOLESTEROL: 112 MG/DL (ref 0–149)
TRIGL SERPL-MCNC: 140 MG/DL (ref 0–149)
TSH SERPL-ACNC: 1.19 MIU/L (ref 0.44–3.98)
VLDL: 28 MG/DL (ref 0–40)

## 2024-08-09 PROCEDURE — 84443 ASSAY THYROID STIM HORMONE: CPT

## 2024-08-09 PROCEDURE — 82306 VITAMIN D 25 HYDROXY: CPT

## 2024-08-09 PROCEDURE — 80061 LIPID PANEL: CPT

## 2024-08-09 PROCEDURE — 83036 HEMOGLOBIN GLYCOSYLATED A1C: CPT

## 2024-08-09 PROCEDURE — 36415 COLL VENOUS BLD VENIPUNCTURE: CPT

## 2024-08-12 ENCOUNTER — TELEPHONE (OUTPATIENT)
Dept: PRIMARY CARE | Facility: CLINIC | Age: 69
End: 2024-08-12
Payer: COMMERCIAL

## 2024-08-12 NOTE — TELEPHONE ENCOUNTER
Per Divya Aaron, APRN-CNP... Attempted to call the patient about results. Per Divya Aaron No voicemail.    DM II: A1c was under 7% at 6.5% Normal vitamin D levels- on the loewr end of normal though, she can start OTC vitamin D 1000IU once daily in addition to any vitamin d she is currently taking. Normal thyroid function. Cholesterol was good.

## 2024-08-12 NOTE — TELEPHONE ENCOUNTER
Called pt back   Per HARDY Alva-CNP... Informed Patient DM II: A1c was under 7% at 6.5%- excellent Normal vitamin D levels- on the loewr end of normal though, she can start OTC vitamin D 1000IU once daily in addition to any vitamin d she is currently taking. Normal thyroid function. Cholesterol was good.    Pt stated she is going to schedule a calcium scoring test then call back and schedule a follow up.

## 2024-08-19 DIAGNOSIS — F41.9 ANXIETY: ICD-10-CM

## 2024-08-19 RX ORDER — FLUOXETINE HYDROCHLORIDE 20 MG/1
20 CAPSULE ORAL DAILY
Qty: 90 CAPSULE | Refills: 0 | Status: SHIPPED | OUTPATIENT
Start: 2024-08-19

## 2024-08-26 ENCOUNTER — OFFICE VISIT (OUTPATIENT)
Dept: OTOLARYNGOLOGY | Facility: HOSPITAL | Age: 69
End: 2024-08-26
Payer: COMMERCIAL

## 2024-08-26 ENCOUNTER — APPOINTMENT (OUTPATIENT)
Dept: PRIMARY CARE | Facility: CLINIC | Age: 69
End: 2024-08-26
Payer: COMMERCIAL

## 2024-08-26 VITALS
BODY MASS INDEX: 32.05 KG/M2 | SYSTOLIC BLOOD PRESSURE: 118 MMHG | HEIGHT: 59 IN | WEIGHT: 159 LBS | OXYGEN SATURATION: 96 % | RESPIRATION RATE: 21 BRPM | HEART RATE: 82 BPM | DIASTOLIC BLOOD PRESSURE: 77 MMHG

## 2024-08-26 VITALS — HEIGHT: 59 IN | WEIGHT: 159 LBS | BODY MASS INDEX: 32.05 KG/M2 | TEMPERATURE: 97.3 F

## 2024-08-26 DIAGNOSIS — E03.9 HYPOTHYROIDISM, UNSPECIFIED TYPE: ICD-10-CM

## 2024-08-26 DIAGNOSIS — R49.0 VOICE HOARSENESS: Primary | ICD-10-CM

## 2024-08-26 DIAGNOSIS — R05.3 CHRONIC COUGH: ICD-10-CM

## 2024-08-26 DIAGNOSIS — R13.10 DYSPHAGIA, UNSPECIFIED TYPE: ICD-10-CM

## 2024-08-26 DIAGNOSIS — R47.89 BREATHY VOICE QUALITY: ICD-10-CM

## 2024-08-26 DIAGNOSIS — E11.9 TYPE 2 DIABETES MELLITUS WITHOUT COMPLICATION, WITHOUT LONG-TERM CURRENT USE OF INSULIN (MULTI): ICD-10-CM

## 2024-08-26 DIAGNOSIS — J34.89 NASAL PAIN: ICD-10-CM

## 2024-08-26 DIAGNOSIS — R13.12 OROPHARYNGEAL DYSPHAGIA: ICD-10-CM

## 2024-08-26 DIAGNOSIS — R53.82 CHRONIC FATIGUE: Primary | ICD-10-CM

## 2024-08-26 DIAGNOSIS — K21.9 REFLUX LARYNGITIS: ICD-10-CM

## 2024-08-26 DIAGNOSIS — E78.00 PURE HYPERCHOLESTEROLEMIA: ICD-10-CM

## 2024-08-26 DIAGNOSIS — E55.9 VITAMIN D DEFICIENCY: ICD-10-CM

## 2024-08-26 DIAGNOSIS — J04.0 REFLUX LARYNGITIS: ICD-10-CM

## 2024-08-26 DIAGNOSIS — J34.89 NASAL LESION: ICD-10-CM

## 2024-08-26 DIAGNOSIS — Z71.89 ADVANCE DIRECTIVE DISCUSSED WITH PATIENT: ICD-10-CM

## 2024-08-26 PROCEDURE — 3008F BODY MASS INDEX DOCD: CPT

## 2024-08-26 PROCEDURE — 3044F HG A1C LEVEL LT 7.0%: CPT

## 2024-08-26 PROCEDURE — 3008F BODY MASS INDEX DOCD: CPT | Performed by: OTOLARYNGOLOGY

## 2024-08-26 PROCEDURE — 1157F ADVNC CARE PLAN IN RCRD: CPT

## 2024-08-26 PROCEDURE — 1159F MED LIST DOCD IN RCRD: CPT | Performed by: OTOLARYNGOLOGY

## 2024-08-26 PROCEDURE — 3048F LDL-C <100 MG/DL: CPT | Performed by: OTOLARYNGOLOGY

## 2024-08-26 PROCEDURE — 3078F DIAST BP <80 MM HG: CPT

## 2024-08-26 PROCEDURE — 1158F ADVNC CARE PLAN TLK DOCD: CPT

## 2024-08-26 PROCEDURE — 1159F MED LIST DOCD IN RCRD: CPT

## 2024-08-26 PROCEDURE — 99214 OFFICE O/P EST MOD 30 MIN: CPT

## 2024-08-26 PROCEDURE — 99214 OFFICE O/P EST MOD 30 MIN: CPT | Performed by: OTOLARYNGOLOGY

## 2024-08-26 PROCEDURE — 3048F LDL-C <100 MG/DL: CPT

## 2024-08-26 PROCEDURE — 1123F ACP DISCUSS/DSCN MKR DOCD: CPT

## 2024-08-26 PROCEDURE — 3044F HG A1C LEVEL LT 7.0%: CPT | Performed by: OTOLARYNGOLOGY

## 2024-08-26 PROCEDURE — 3074F SYST BP LT 130 MM HG: CPT

## 2024-08-26 PROCEDURE — 1036F TOBACCO NON-USER: CPT

## 2024-08-26 PROCEDURE — 31579 LARYNGOSCOPY TELESCOPIC: CPT | Performed by: OTOLARYNGOLOGY

## 2024-08-26 PROCEDURE — 1160F RVW MEDS BY RX/DR IN RCRD: CPT

## 2024-08-26 PROCEDURE — 1157F ADVNC CARE PLAN IN RCRD: CPT | Performed by: OTOLARYNGOLOGY

## 2024-08-26 ASSESSMENT — ENCOUNTER SYMPTOMS
TREMORS: 0
SINUS PRESSURE: 0
FLANK PAIN: 0
RECTAL PAIN: 0
MUSCULOSKELETAL NEGATIVE: 1
SHORTNESS OF BREATH: 0
CARDIOVASCULAR NEGATIVE: 1
MYALGIAS: 0
NAUSEA: 0
SEIZURES: 0
ANAL BLEEDING: 0
BACK PAIN: 0
RHINORRHEA: 0
NUMBNESS: 0
EYES NEGATIVE: 1
NECK PAIN: 0
HEMATURIA: 0
LOSS OF SENSATION IN FEET: 0
DYSPHORIC MOOD: 0
RESPIRATORY NEGATIVE: 1
DYSURIA: 0
PSYCHIATRIC NEGATIVE: 1
DIARRHEA: 0
CONFUSION: 0
DIFFICULTY URINATING: 0
LIGHT-HEADEDNESS: 0
APNEA: 0
NEUROLOGICAL NEGATIVE: 1
GASTROINTESTINAL NEGATIVE: 1
CONSTIPATION: 0
SLEEP DISTURBANCE: 0
SPEECH DIFFICULTY: 0
ACTIVITY CHANGE: 0
HEMATOLOGIC/LYMPHATIC NEGATIVE: 1
POLYDIPSIA: 0
UNEXPECTED WEIGHT CHANGE: 0
CHEST TIGHTNESS: 0
WEAKNESS: 0
FEVER: 0
JOINT SWELLING: 0
HYPERACTIVE: 0
OCCASIONAL FEELINGS OF UNSTEADINESS: 0
NERVOUS/ANXIOUS: 0
NECK STIFFNESS: 0
DIZZINESS: 0
WHEEZING: 0
COUGH: 0
DIAPHORESIS: 0
SORE THROAT: 0
SINUS PAIN: 0
BLOOD IN STOOL: 0
POLYPHAGIA: 0
BRUISES/BLEEDS EASILY: 0
ENDOCRINE NEGATIVE: 1
ABDOMINAL DISTENTION: 0
FREQUENCY: 0
ABDOMINAL PAIN: 0
EYE DISCHARGE: 0
HEADACHES: 0
PHOTOPHOBIA: 0
AGITATION: 0
APPETITE CHANGE: 0
STRIDOR: 0
FATIGUE: 1
DEPRESSION: 0
CHILLS: 0
PALPITATIONS: 0
COLOR CHANGE: 0

## 2024-08-26 ASSESSMENT — PATIENT HEALTH QUESTIONNAIRE - PHQ9
2. FEELING DOWN, DEPRESSED OR HOPELESS: NOT AT ALL
1. LITTLE INTEREST OR PLEASURE IN DOING THINGS: NOT AT ALL
2. FEELING DOWN, DEPRESSED OR HOPELESS: NOT AT ALL
1. LITTLE INTEREST OR PLEASURE IN DOING THINGS: NOT AT ALL
SUM OF ALL RESPONSES TO PHQ9 QUESTIONS 1 AND 2: 0
SUM OF ALL RESPONSES TO PHQ9 QUESTIONS 1 & 2: 0

## 2024-08-26 NOTE — PROGRESS NOTES
"Primary Care Provider: Divya Aaron, APRN-CNP    Subjective   Susan Rowan is a 69 y.o. female who presents for Follow-up (Swallowing issues).    HPI     Has concerns of persisting fatigue  No trouble sleeping; wearing CPAP nightly- sees sleep medicine on 10/14/24 for an extensive review of her CPAP  Mood has been good; PHQ-2: 0  Exercising 3-4 times a week; no issues with this  No chest pain, no SOB, no muscle pain, no weakness, no joint pain, no headaches, no fevers, no chills, no constipation, no diarrhea  Never smoker    Healthcare POA-  Juno Rowan, ; full code; requested paperwork    Hypothyroidism   TSH was WNL on 8/9    Vitamin d level was 38; taking OTC Vitamin D 1,000IU    DM II- diet controlled  Lab Results   Component Value Date    HGBA1C 6.5 (H) 08/09/2024     HLD    Lab Results   Component Value Date    CHOL 170 08/09/2024    CHOL 153 05/21/2022    CHOL 153 06/13/2020     Lab Results   Component Value Date    HDL 57.6 08/09/2024    HDL 62.2 05/21/2022    HDL 62.8 06/13/2020     Lab Results   Component Value Date    LDLCALC 84 08/09/2024     Lab Results   Component Value Date    TRIG 140 08/09/2024    TRIG 108 05/21/2022    TRIG 128 06/13/2020     No components found for: \"CHOLHDL\"    Has been gagging and coughing, will vomit occ; voice hoarseness; going on for 1 month  Is seeing ENT today at 2:45pm.    No unintentional weight loss    Colonoscopy last 10/14/22- repeat 5 year 10/14/2027    Review of Systems   Constitutional:  Positive for fatigue. Negative for activity change, appetite change, chills, diaphoresis, fever and unexpected weight change.   HENT:  Negative for congestion, dental problem, ear discharge, ear pain, hearing loss, mouth sores, nosebleeds, postnasal drip, rhinorrhea, sinus pressure, sinus pain, sneezing, sore throat and tinnitus.         SEE HPI   Eyes: Negative.  Negative for photophobia, discharge and visual disturbance.   Respiratory: Negative.  Negative for apnea, " "cough, chest tightness, shortness of breath, wheezing and stridor.    Cardiovascular: Negative.  Negative for chest pain, palpitations and leg swelling.   Gastrointestinal: Negative.  Negative for abdominal distention, abdominal pain, anal bleeding, blood in stool, constipation, diarrhea, nausea and rectal pain.   Endocrine: Negative.  Negative for cold intolerance, heat intolerance, polydipsia, polyphagia and polyuria.   Genitourinary: Negative.  Negative for decreased urine volume, difficulty urinating, dysuria, flank pain, frequency, hematuria and urgency.   Musculoskeletal: Negative.  Negative for back pain, gait problem, joint swelling, myalgias, neck pain and neck stiffness.   Skin: Negative.  Negative for color change and rash.   Neurological: Negative.  Negative for dizziness, tremors, seizures, syncope, speech difficulty, weakness, light-headedness, numbness and headaches.   Hematological: Negative.  Does not bruise/bleed easily.   Psychiatric/Behavioral: Negative.  Negative for agitation, confusion, dysphoric mood, sleep disturbance and suicidal ideas. The patient is not nervous/anxious and is not hyperactive.    All other systems reviewed and are negative.        Objective   /77   Pulse 82   Resp 21   Ht 1.499 m (4' 11\")   Wt 72.1 kg (159 lb)   LMP  (LMP Unknown)   SpO2 96%   BMI 32.11 kg/m²     Physical Exam  Vitals reviewed.   Constitutional:       General: She is not in acute distress.     Appearance: Normal appearance. She is normal weight. She is not ill-appearing, toxic-appearing or diaphoretic.   HENT:      Head: Normocephalic and atraumatic.      Right Ear: Tympanic membrane, ear canal and external ear normal. There is no impacted cerumen.      Left Ear: Tympanic membrane, ear canal and external ear normal. There is no impacted cerumen.   Eyes:      Conjunctiva/sclera: Conjunctivae normal.   Cardiovascular:      Rate and Rhythm: Normal rate and regular rhythm.      Pulses: Normal " "pulses.      Heart sounds: Normal heart sounds. No murmur heard.     No friction rub. No gallop.   Pulmonary:      Effort: Pulmonary effort is normal. No respiratory distress.      Breath sounds: Normal breath sounds.   Abdominal:      General: Abdomen is flat. Bowel sounds are normal.      Palpations: Abdomen is soft.   Musculoskeletal:         General: Normal range of motion.      Cervical back: Normal range of motion and neck supple.   Lymphadenopathy:      Cervical: No cervical adenopathy.   Skin:     General: Skin is warm and dry.      Capillary Refill: Capillary refill takes less than 2 seconds.   Neurological:      General: No focal deficit present.      Mental Status: She is alert and oriented to person, place, and time. Mental status is at baseline.   Psychiatric:         Mood and Affect: Mood normal.         Behavior: Behavior normal.         Thought Content: Thought content normal.         Judgment: Judgment normal.         Assessment/Plan   Problem List Items Addressed This Visit             ICD-10-CM    Hyperlipidemia E78.5    Stable, c/w zetia and rosuvastatin  Lab Results   Component Value Date    CHOL 170 08/09/2024    CHOL 153 05/21/2022    CHOL 153 06/13/2020     Lab Results   Component Value Date    HDL 57.6 08/09/2024    HDL 62.2 05/21/2022    HDL 62.8 06/13/2020     Lab Results   Component Value Date    LDLCALC 84 08/09/2024     Lab Results   Component Value Date    TRIG 140 08/09/2024    TRIG 108 05/21/2022    TRIG 128 06/13/2020     No components found for: \"CHOLHDL\"  Lifestyle changes to work on getting LDL from 84 to 70  Relevant Orders    Comprehensive metabolic panel    CBC and Auto Differential    Urinalysis with Reflex Microscopic    Hypothyroid E03.9    Stable  C.w Levothyroxine  Relevant Orders    Comprehensive metabolic panel    CBC and Auto Differential    Urinalysis with Reflex Microscopic    Reflux laryngitis J04.0, K21.9    New  Sees ENT today  Relevant Orders    Comprehensive " metabolic panel    CBC and Auto Differential    Urinalysis with Reflex Microscopic    Vitamin D deficiency E55.9    Increase OTC Vitamin D3 2,000IU  Relevant Orders    Comprehensive metabolic panel    CBC and Auto Differential    Urinalysis with Reflex Microscopic    Advance directive discussed with patient  Healthcare POROMA Rowan, ; full code; requested paperwork Z71.89    Type 2 diabetes mellitus without complication, without long-term current use of insulin (Multi) E11.9    Stable  Diet controlled  Lab Results   Component Value Date    HGBA1C 6.5 (H) 08/09/2024   Relevant Orders    Comprehensive metabolic panel    CBC and Auto Differential    Urinalysis with Reflex Microscopic    Chronic fatigue - Primary R53.82    Depression?- has been stable; OSWALDO?- wearing CPAP nightly- sees sleep med on 10/14/24; increase vitamin d supplement  C/w regular physical activity  Relevant Orders    Comprehensive metabolic panel    CBC and Auto Differential    Urinalysis with Reflex Microscopic     Follow up in 3-4 months or sooner if needed

## 2024-08-26 NOTE — PROGRESS NOTES
ASSESSMENT AND PLAN:   Susan Rowan is a 69 y.o. female with a history of chronic cough ongoing for some time. Tried baclofen and Gaviscon in past.   Today, she presents with new swallow concerns.   Today on stroboscopy no evidence of masses or lesions in larynx. She does report voice changes with mild asymmetry and mild right VC edema otherwise normal.    We discussed assessment of her swallow. Order MBSS and barium esophagram. Patient reports left-sided nasal irritation comes and goes, but was not seen on exam. This sounds like a furuncle. Prescribe Bactorban intranasal. Also suggested OTC Bacitracin ointment in the nares nightly.        Reason For Consult  Chief Complaint   Patient presents with    Follow-up     Difficulty swallowing, severe coughing until vomiting.        HISTORY OF PRESENT ILLNESS:  Susan Rowan is a 69 y.o. female presenting for a follow-up visit with me for dysphagia and severe cough leading to vomiting.     Presents for follow-up for chronic cough. She has been using Baclofen 3x day with significant improvement and has also been utilizing Gaviscon. We recommended that she discontinue use of PPI as it has not been helping. We also suggested adding nasal saline as well as Flonase    The patient reports for the last month she has had difficulty with swallowing solids more than liquids. Especially over the the last week she feels like there is something stuck in her throat. She has not noted that one food is worse than another.     She will have spells of coughing, that leads to retching and vomiting. She thinks it occurs more at night. She explains that she will take a breath in and it goes the wrong way and begins gagging. She also notes her voice has been raspy.     Patient reports left-sided nasal irritation comes and goes.  Patient had throat polyps when she was younger.     Prior History:   Last seen 12/7/23 Assessment and Plan:  Susan Rowan is a 68 y.o. female with a history of  chronic cough on baclofen 10 mg TID who is doing well with this dose. The cough is all but gone. She has urgency of cough in the middle of the day. We discussed adding an additional 5 mg in the afternoon. We discussed annual visit in person, but she may follow up for refill of medications as needed.     6/22/23 A/P:  This is a 67 year old female with a history of chronic throat clearing that has been ongoing for decades. She is doing well on Gaviscon and Baclofen. She is doing the speech strategies. We placed a refill of Baclofen and she will follow up as needed. She will increase her Gaviscon use as she is wishing to reduce her PPI. SHe will stop PPI for now. She will stop Flonase and add nasal saline      Past Medical History  She has a past medical history of Encounter for full-term uncomplicated delivery ([facility]), Encounter for screening for malignant neoplasm of colon (03/22/2017), Other conditions influencing health status (05/25/2018), Other specified postprocedural states, Overactive bladder, Pain in unspecified foot (09/19/2017), Personal history of other diseases of the circulatory system (03/06/2020), Personal history of other diseases of the musculoskeletal system and connective tissue, Personal history of other diseases of the respiratory system (05/16/2018), Personal history of other diseases of the respiratory system (05/16/2018), Personal history of other specified conditions (06/13/2019), Personal history of urinary calculi, Pure hypercholesterolemia, unspecified, and Varicella without complication. Surgical History  She has a past surgical history that includes Other surgical history (03/06/2020) and Other surgical history (03/22/2017).   Social History  She reports that she has never smoked. She has never been exposed to tobacco smoke. She has never used smokeless tobacco. She reports current alcohol use of about 1.0 standard drink of alcohol per week. She reports that she does not use drugs.  "Allergies  Vancomycin, Cephalexin, Penicillins, Ozempic [semaglutide], and Tolterodine     Family History  Family History   Problem Relation Name Age of Onset    Arthritis Mother      Other (CARDIAC DISORDER) Mother      Other (CVA) Mother      Hyperlipidemia Mother      Osteoporosis Mother      Other (HOCM) Mother      Arthritis Father      Other (CARDIAC DISORDER) Father      Other (CEREBROVASCULAR ACCIDENT) Father      Hyperlipidemia Father      Other (MI) Father      Stroke Father      Hyperlipidemia Sister      Hyperlipidemia Brother      Anxiety disorder Other MAT GM     Depression Other MAT GM     Breast cancer Other MAT AUNT     Other (UTERUS CANCER) Other MAT AUNT        Review of Systems  All 10 systems were reviewed and negative except for above.      Last Recorded Vitals  Temperature 36.3 °C (97.3 °F), height 1.499 m (4' 11\"), weight 72.1 kg (159 lb), not currently breastfeeding.    Physical Exam  ENT Physical Exam   ENT Physical Exam  Constitutional  Appearance: patient appears well-developed and well-nourished,  Head and Face  Appearance: head appears normal and face appears normal;  Ear  Auricles: right auricle normal; left auricle normal;  Nose  External Nose: nares patent bilaterally;  Oral Cavity/Oropharynx  Lips: normal;  Neck  Neck: neck normal; neck palpation normal;  Respiratory  Inspection: no retractions visible;  Cardiovascular  Inspection: no peripheral edema present;  Neurovestibular  Mental Status: alert and oriented;  Psychiatric: mood normal;  Cranial Nerves: cranial nerves intact;     Relevant Results       Patient Reported Outcome Measures         Radiology, Laboratory and Pathology  No results found.      Procedures   Flexible Laryngoscopy w/ Videostroboscopy    VOICE AND SPEECH CHARACTERISTICS:  Normal spoken speech, no dysphonia, no roughness, no breathiness, no asthenia, no strain.    Intelligibility: normal.   Resonance: balanced.   Vocal Loudness: normal.   Breath Support: " normal.    PROCEDURE:    Indications: difficulty swallowing and cough  PROCEDURE NOTE: FLEXIBLE LARYNGOSCOPY WITH STROBOSCOPY  I recommended a flexible laryngoscopy with stroboscopy based on PE findings, and/or concern for mucosal wave details based upon history and/or for issues associated with hyperreflexic gag on mirror exam concerning for pathology. Risks, benefits, and alternatives were explained. The patient wishes to proceed and gives verbal consent.   Patient is seated in the exam chair. After adequate topical anesthesia, I advance the flexible endoscope. The examination included evaluation of the miranda, vallecula, base of tongue, pyriforms, post-cricoid area, larynx and immediate subglottis.    Reflux Finding Score  Subglottic edema: Absent   Thick Mucus: Absent   Granuloma: Absent   Ventricular Obliteration: Partial   Erythema/Hyperemia: Absent   IA Thickening: Mild   TVC Edema: Absent   Diffuse Laryngitis: Absent     Gross Arytenoid Movement: symmetric.  Arytenoid Height: normal.   Supraglottic Tension: none.    Symmetry: asymmetry.   Amplitude: normal.  Phase Closure: in-phase.  Periodicity: aperiodic.  Mucosal Wave Lateral Excursion/Secondary Wave: Bilateral Vocal Cord: no restriction - wave moved more than ½ the width of the vocal fold.    Closure: closed.    Time Spent  Prep time on day of patient encounter: 10 minutes  Time spent directly with patient, family or caregiver: 15 minutes  Additional Time Spent on Patient Care Activities/Discussion with SLP re care plan: 5 minutes  Documentation Time: 10 minutes  Other Time Spent: 0 minutes  Total: 40 minutes     Scribe Attestation  By signing my name below, IMolly , Scrabbie   attest that this documentation has been prepared under the direction and in the presence of Boyd Do MD.

## 2024-08-29 RX ORDER — MUPIROCIN 20 MG/G
OINTMENT TOPICAL NIGHTLY
Qty: 15 G | Refills: 1 | Status: SHIPPED | OUTPATIENT
Start: 2024-08-29 | End: 2024-11-27

## 2024-09-19 ENCOUNTER — HOSPITAL ENCOUNTER (OUTPATIENT)
Dept: RADIOLOGY | Facility: HOSPITAL | Age: 69
Discharge: HOME | End: 2024-09-19
Payer: COMMERCIAL

## 2024-09-19 DIAGNOSIS — R13.10 DYSPHAGIA, UNSPECIFIED TYPE: ICD-10-CM

## 2024-09-19 PROCEDURE — 92611 MOTION FLUOROSCOPY/SWALLOW: CPT | Mod: GN

## 2024-09-19 PROCEDURE — 2500000005 HC RX 250 GENERAL PHARMACY W/O HCPCS: Performed by: OTOLARYNGOLOGY

## 2024-09-19 PROCEDURE — A9698 NON-RAD CONTRAST MATERIALNOC: HCPCS | Performed by: OTOLARYNGOLOGY

## 2024-09-19 PROCEDURE — 74220 X-RAY XM ESOPHAGUS 1CNTRST: CPT

## 2024-09-19 PROCEDURE — 2500000001 HC RX 250 WO HCPCS SELF ADMINISTERED DRUGS (ALT 637 FOR MEDICARE OP): Performed by: OTOLARYNGOLOGY

## 2024-09-19 PROCEDURE — 74230 X-RAY XM SWLNG FUNCJ C+: CPT

## 2024-09-19 NOTE — ADDENDUM NOTE
Encounter addended by: Lucien Cartagena, SLP on: 9/19/2024 10:49 AM   Actions taken: Clinical Note Signed

## 2024-09-19 NOTE — PROCEDURES
Speech-Language Pathology    Outpatient Modified Barium Swallow Study    Patient Name: Susan Rowan  MRN: [id number]  : 1955  Today's Date: 24  Time Calculation  Start Time: 0900  Stop Time: 940  Time Calculation (min): 40 min        Modified Barium Swallow Study completed. Informed verbal consent obtained prior to completion of exam. The study was completed per protocol with various liquid barium consistencies, pudding, solids and a 13mm barium tablet. A 1.9 cm or .75 inch (outer diameter) ring was placed on the chin in the lateral view and on the lateral, left side of the neck in the a-p view in order to complete objective measurements during swallowing. The anatomic structures and function of the oropharynx, larynx, hypopharynx and cervical esophagus were evaluated.    SLP: Lucien Cartagena SLP   Contact info: [employer] chat; phone: [phone]      Reason for Referral: Patient c/o choking and coughing; not necessarily with PO.    Patient Hx: Colon CA recent visit with [facility] dx of chronic cough.  Respiratory Status: Room air  Current diet: Regular foods and liquids.    Pain:  Pain Scale: 0-10  Ratin    FINAL SPEECH RECOMMENDATIONS    DIET:   - Regular (IDDSI Level 7)  - Thin liquids (IDDSI Level 0)    PLAN:  SLP Plan: No treatment needs identified at this time   Discussed POC: Patient  Discussed Risks/Benefits: Yes  Patient/Caregiver Agreeable: Yes    Education Provided: Results and recommendations per MBSS, with video review; recommendations and POC at this time. Verbal understanding and agreement given on all accounts.     Additional consult suggested: Follow up with MARIBETH- Dr. Do to review results.    Repeat study/ dc plan: No       Mechanics of the Swallow Summary:  ORAL PHASE:  Lip Closure - No labial escape/anterior loss of bolus   Tongue Control During Bolus Hold - Escape to lateral buccal cavity and/or floor of mouth   Bolus prep/mastication - Slow prolonged mastication with  complete re-collection necessary   Bolus transport/lingual motion - Brisk tongue motion for A-P movement of the bolus   Oral residue - Residue collection on oral structure     PHARYNGEAL PHASE:  Initiation of pharyngeal swallow - Bolus head at pit of pyriforms   Soft palate elevation - No bolus between soft palate/pharyngeal wall   Laryngeal elevation - Complete superior movement of thyroid cartilage with contact of arytenoids to epiglottic petiole   Anterior hyoid excursion - Partial anterior movement   Epiglottic movement - Complete inversion    Laryngeal vestibule closure - Complete - no air/contrast in laryngeal vestibule   Pharyngeal stripping wave - Complete  Pharyngeal contraction (A/P view) - Complete  Pharyngoesophageal segment opening - Complete distension and complete duration/no obstruction of flow of bolus   Tongue base retraction - Trace column of contrast or air between tongue base and pharyngeal wall   Pharyngeal residue - Complete pharyngeal clearance     ESOPHAGEAL PHASE:  Esophageal clearance - Esophageal retention with retrograde flow below the pharyngoesophageal segment - see esophagram completed on this date.       SLP Impressions with Severity Rating:   Oropharyngeal swallow within functional limits. Swallowing physiology is detailed above. Impairments most impacting swallowing efficiency include maneuvers of the oral phase. This may be a result of over compensating behaviors given the globus sensation the patient was endorsing during exam. SLP utilized video screen as biofeedback during exam to demonstrate patients swallow function during globus sensations events; at which time pharyngeal lumen was clear of stasis. Patient demonstrated no evidence of penetration or aspiration across all textures and consistencies. Upon a-p view brief retention of purees and barium tablet at the LES. The A-P bolus follow-through is not intended to be utilized as a diagnostic assessment of the esophagus, rather  a tool to observe the biomechanically aspects of the swallow continuum and to inform the need for further evaluation by medical specialists, as applicable.      Strategies attempted- n/a    OUTCOME MEASURES:  Functional Oral Intake Scale  Functional Oral Intake Scale: Level 7        total oral diet with no restrictions     Eating Assessment Tool (EAT-10) TOTAL SCORE 07/40   A total score of 3 or above may indicate difficulty with swallowing safely and/or efficiently    Rosenbek's Penetration Aspiration Scale  Thin Liquids: 1. NO ASPIRATION & NO PENETRATION - no aspiration, contrast does not enter airway  [place] Thick Liquids: 1. NO ASPIRATION & NO PENETRATION - no aspiration, contrast does not enter airway  Puree: 1. NO ASPIRATION & NO PENETRATION - no aspiration, contrast does not enter airway  Solids: 1. NO ASPIRATION & NO PENETRATION - no aspiration, contrast does not enter airway

## 2024-09-23 ENCOUNTER — APPOINTMENT (OUTPATIENT)
Dept: OTOLARYNGOLOGY | Facility: HOSPITAL | Age: 69
End: 2024-09-23
Payer: COMMERCIAL

## 2024-09-23 DIAGNOSIS — N20.0 KIDNEY STONE: Primary | ICD-10-CM

## 2024-09-25 ENCOUNTER — APPOINTMENT (OUTPATIENT)
Dept: UROLOGY | Facility: CLINIC | Age: 69
End: 2024-09-25
Payer: COMMERCIAL

## 2024-10-09 ENCOUNTER — APPOINTMENT (OUTPATIENT)
Dept: UROLOGY | Facility: CLINIC | Age: 69
End: 2024-10-09
Payer: COMMERCIAL

## 2024-10-09 DIAGNOSIS — N20.0 RECURRENT NEPHROLITHIASIS: Primary | ICD-10-CM

## 2024-10-09 PROCEDURE — 1157F ADVNC CARE PLAN IN RCRD: CPT | Performed by: UROLOGY

## 2024-10-09 PROCEDURE — 3048F LDL-C <100 MG/DL: CPT | Performed by: UROLOGY

## 2024-10-09 PROCEDURE — 3044F HG A1C LEVEL LT 7.0%: CPT | Performed by: UROLOGY

## 2024-10-09 PROCEDURE — 99213 OFFICE O/P EST LOW 20 MIN: CPT | Performed by: UROLOGY

## 2024-10-09 NOTE — PROGRESS NOTES
Subjective     This visit was completed via telemedicine. All issues as below were discussed and addressed but no physical exam was performed unless allowed by visual confirmation. If it was felt that the patient should be evaluated in clinic, then they were directed there. Patient verbally consented to visit.      Susan Rowan is a 69 y.o. female with history of urge incontinence and recurrent nephrolithiasis s/p right ureteroscopy for 5mm stone in the right ureter at [facility], she presents today for annual visit. She has no new complaints. Denies any recent gross hematuria, fevers, chills, urinary retention, intractable flank or abdominal pain, nausea or vomiting.        Past Medical History:   Diagnosis Date    Encounter for full-term uncomplicated delivery ([facility])      (spontaneous vaginal delivery)    Encounter for screening for malignant neoplasm of colon 2017    Colon cancer screening    Other conditions influencing health status 2018    History of cough    Other specified postprocedural states     History of surgery on arm    Overactive bladder     Pain in unspecified foot 2017    Acute foot pain    Personal history of other diseases of the circulatory system 2020    History of abnormal electrocardiography    Personal history of other diseases of the musculoskeletal system and connective tissue     History of arthritis    Personal history of other diseases of the respiratory system 2018    History of sinusitis    Personal history of other diseases of the respiratory system 2018    History of sore throat    Personal history of other specified conditions 2019    History of urinary urgency    Personal history of urinary calculi     History of renal calculi    Pure hypercholesterolemia, unspecified     High cholesterol    Varicella without complication     Varicella without complication     Past Surgical History:   Procedure Laterality Date    OTHER SURGICAL HISTORY   03/06/2020    Elbow surgery    OTHER SURGICAL HISTORY  03/22/2017    Repair Of Humerus / Arm     Family History   Problem Relation Name Age of Onset    Arthritis Mother      Other (CARDIAC DISORDER) Mother      Other (CVA) Mother      Hyperlipidemia Mother      Osteoporosis Mother      Other (HOCM) Mother      Arthritis Father      Other (CARDIAC DISORDER) Father      Other (CEREBROVASCULAR ACCIDENT) Father      Hyperlipidemia Father      Other (MI) Father      Stroke Father      Hyperlipidemia Sister      Hyperlipidemia Brother      Anxiety disorder Other MAT GM     Depression Other MAT GM     Breast cancer Other MAT AUNT     Other (UTERUS CANCER) Other MAT AUNT      Current Outpatient Medications   Medication Sig Dispense Refill    aspirin 81 mg EC tablet Take 1 tablet (81 mg) by mouth once daily.      baclofen (Lioresal) 10 mg tablet Take 1 tablet (10 mg) by mouth 3 times a day. 270 tablet 2    busPIRone (Buspar) 10 mg tablet Take 2 tablets (20 mg) by mouth 2 times a day. 360 tablet 3    cholecalciferol (Vitamin D-3) 25 MCG (1000 UT) capsule Take 2 capsules (50 mcg) by mouth once daily.      ezetimibe (Zetia) 10 mg tablet Take 1 tablet (10 mg) by mouth once daily. 90 tablet 3    FLUoxetine (PROzac) 20 mg capsule TAKE ONE CAPSULE BY MOUTH ONCE DAILY 90 capsule 0    levothyroxine (Synthroid, Levoxyl) 75 mcg tablet Take 1 tablet (75 mcg) by mouth once daily. Take 1 tablet daily and double on sundays 90 tablet 3    mupirocin (Bactroban) 2 % ointment Apply topically once daily at bedtime. 15 g 1    omega-3 fatty acids-fish oil 340-1,000 mg capsule Take 1 capsule by mouth once daily in the evening.      potassium citrate CR (Urocit-K-10) 10 mEq ER tablet Take 1 tablet (10 mEq) by mouth 2 times a day with meals. Do not crush, chew, or split. 180 tablet 3    rosuvastatin (Crestor) 20 mg tablet Take 1 tablet (20 mg) by mouth once daily. 90 tablet 3     No current facility-administered medications for this visit.      Allergies   Allergen Reactions    Vancomycin Rash    Cephalexin Unknown and Hives    Penicillins Hives and Unknown    Ozempic [Semaglutide] Other     Myalgia, severe fatigue, vomiting    Tolterodine Unknown, GI Upset, Other and Nausea/vomiting     Social History     Socioeconomic History    Marital status:      Spouse name: Not on file    Number of children: Not on file    Years of education: Not on file    Highest education level: Not on file   Occupational History    Not on file   Tobacco Use    Smoking status: Never     Passive exposure: Never    Smokeless tobacco: Never   Vaping Use    Vaping status: Never Used   Substance and Sexual Activity    Alcohol use: Yes     Alcohol/week: 1.0 standard drink of alcohol     Types: 1 Shots of liquor per week    Drug use: Never    Sexual activity: Yes   Other Topics Concern    Not on file   Social History Narrative    Not on file     Social Determinants of Health     Financial Resource Strain: Not on file   Food Insecurity: Not on file   Transportation Needs: Not on file   Physical Activity: Not on file   Stress: Not on file   Social Connections: Not on file   Intimate Partner Violence: Not on file   Housing Stability: Not on file       Review of Systems  Pertinent items are noted in HPI.    Objective       Lab Review  Lab Results   Component Value Date    WBC 7.0 05/07/2024    RBC 4.84 05/07/2024    HGB 14.8 05/07/2024    HCT 43.9 05/07/2024     05/07/2024      Lab Results   Component Value Date    BUN 17 05/07/2024    CREATININE 0.83 05/07/2024              Assessment/Plan   There are no diagnoses linked to this encounter.    Nephrolithiasis, s/p right ureteroscopy for a 5mm stone in the right ureter at [facility]     Patient had imaging from an outside institution which showed non-obstructing left inferior pole stones measuring 8 and 9 mm.     We will obtain a CT A&P for further evaluation and follow up virtually to review.     All questions were answered to  the patient's satisfaction. Patient agrees with the plan and wishes to proceed. Follow-up will be scheduled appropriately.       Scribed for Dr. Velasquez by Maricarmen Benz. I , Dr Velasquez, have personally reviewed and agreed with the information entered by the Virtual Scribe.

## 2024-10-14 ENCOUNTER — APPOINTMENT (OUTPATIENT)
Dept: SLEEP MEDICINE | Facility: CLINIC | Age: 69
End: 2024-10-14
Payer: COMMERCIAL

## 2024-10-14 VITALS
HEART RATE: 81 BPM | BODY MASS INDEX: 33.33 KG/M2 | WEIGHT: 165 LBS | DIASTOLIC BLOOD PRESSURE: 76 MMHG | TEMPERATURE: 98.3 F | SYSTOLIC BLOOD PRESSURE: 123 MMHG

## 2024-10-14 DIAGNOSIS — G47.33 OSA (OBSTRUCTIVE SLEEP APNEA): Primary | ICD-10-CM

## 2024-10-14 PROCEDURE — 99213 OFFICE O/P EST LOW 20 MIN: CPT | Performed by: PHYSICIAN ASSISTANT

## 2024-10-14 PROCEDURE — 1159F MED LIST DOCD IN RCRD: CPT | Performed by: PHYSICIAN ASSISTANT

## 2024-10-14 PROCEDURE — 3074F SYST BP LT 130 MM HG: CPT | Performed by: PHYSICIAN ASSISTANT

## 2024-10-14 PROCEDURE — 1036F TOBACCO NON-USER: CPT | Performed by: PHYSICIAN ASSISTANT

## 2024-10-14 PROCEDURE — 3044F HG A1C LEVEL LT 7.0%: CPT | Performed by: PHYSICIAN ASSISTANT

## 2024-10-14 PROCEDURE — 3078F DIAST BP <80 MM HG: CPT | Performed by: PHYSICIAN ASSISTANT

## 2024-10-14 PROCEDURE — 3048F LDL-C <100 MG/DL: CPT | Performed by: PHYSICIAN ASSISTANT

## 2024-10-14 PROCEDURE — 1157F ADVNC CARE PLAN IN RCRD: CPT | Performed by: PHYSICIAN ASSISTANT

## 2024-10-14 NOTE — PROGRESS NOTES
Patient: Susan Rowan    [id number]  : 1955 -- AGE 69 y.o.    Provider: María Elena Flores PA-C     Location [facility]   Service Date: 10/14/2024              [facility]  Followup Visit Note    HISTORY OF PRESENT ILLNESS     HISTORY OF PRESENT ILLNESS   Susan Rowan is a 69 y.o. female with h/o  OSWALDO,obesity, HLD, multiple comorbidities who presents to a [facility] for followup.     PAST SLEEP HISTORY:   2022 - Dar HSAT - AHI of 35    Assessment and plan from last visit: 2024  OSWALDO (obstructive sleep apnea) - Primary G47.33        -rAHI ~9 on DL  -not snoring with device, has otherwise not felt a huge difference since starting PAP   -high leak noted likely contributing to her elevated rAHI  -has chin strap, mask also flipping up at times  -bring to clinic next appointment for troubleshooting/look at mask fit            Relevant Orders     Positive Airway Pressure (PAP) Therapy         Current History    On today's visit, the patient reports doing very well with cpap at this time. States she loja gotten very used to therapy and no issues, barely notices it. Switched from N30i small wide cushion to medium and this has helped with her leak drastically. She is very happy. Stopped using distilled water, running machine dry, has kept humidity/temperatures settings off.   Denies any other sleep issues/concerns today.         ESS:  4  JONATAN:  0  FOSQ: 38    REVIEW OF SYSTEMS     REVIEW OF SYSTEMS  See HPI; all other ROS were reviewed and negative for compliant      ALLERGIES AND MEDICATIONS     ALLERGIES  Allergies   Allergen Reactions    Vancomycin Rash    Cephalexin Unknown and Hives    Penicillins Hives and Unknown    Ozempic [Semaglutide] Other     Myalgia, severe fatigue, vomiting    Tolterodine Unknown, GI Upset, Other and Nausea/vomiting       MEDICATIONS: She has a current medication list which includes the following  prescription(s): aspirin - Take 1 tablet (81 mg) by mouth once daily, baclofen - Take 1 tablet (10 mg) by mouth 3 times a day, buspirone - Take 2 tablets (20 mg) by mouth 2 times a day, cholecalciferol - Take 2 capsules (50 mcg) by mouth once daily, ezetimibe - Take 1 tablet (10 mg) by mouth once daily, fluoxetine - TAKE ONE CAPSULE BY MOUTH ONCE DAILY, levothyroxine - Take 1 tablet (75 mcg) by mouth once daily. Take 1 tablet daily and double on sundays, mupirocin - Apply topically once daily at bedtime, omega-3 fatty acids-fish oil - Take 1 capsule by mouth once daily in the evening, potassium citrate cr - Take 1 tablet (10 mEq) by mouth 2 times a day with meals. Do not crush, chew, or split, and rosuvastatin - Take 1 tablet (20 mg) by mouth once daily.    PAST MEDICAL HISTORY : She  has a past medical history of Encounter for full-term uncomplicated delivery, Encounter for screening for malignant neoplasm of colon (03/22/2017), Other conditions influencing health status (05/25/2018), Other specified postprocedural states, Overactive bladder, Pain in unspecified foot (09/19/2017), Personal history of other diseases of the circulatory system (03/06/2020), Personal history of other diseases of the musculoskeletal system and connective tissue, Personal history of other diseases of the respiratory system (05/16/2018), Personal history of other diseases of the respiratory system (05/16/2018), Personal history of other specified conditions (06/13/2019), Personal history of urinary calculi, Pure hypercholesterolemia, unspecified, and Varicella without complication.    PAST SURGICAL HISTORY: She  has a past surgical history that includes Other surgical history (03/06/2020) and Other surgical history (03/22/2017).     FAMILY HISTORY: No changes since previous visit. Otherwise non-contributory as charted.     SOCIAL HISTORY  She  reports that she has never smoked. She has never been exposed to tobacco smoke. She has never  used smokeless tobacco. She reports current alcohol use of about 1.0 standard drink of alcohol per week. She reports that she does not use drugs.       PHYSICAL EXAM     VITAL SIGNS: /76   Pulse 81   Temp 36.8 °C (98.3 °F)   Wt 74.8 kg (165 lb)   LMP  (LMP Unknown)   BMI 33.33 kg/m²        PREVIOUS WEIGHTS:  Wt Readings from Last 3 Encounters:   10/14/24 74.8 kg (165 lb)   08/26/24 72.1 kg (159 lb)   08/26/24 72.1 kg (159 lb)       Constitutional: Alert and oriented, cooperative, no acute distress  Head: Normocephalic, atraumatic   Cranial Features: No abnormal craniofacial features  Neck: Supple. Trachea midline.  Pulmonary: Non-labored breathing, speaks in full sentences. No cough.    Cardiac: regular rate   Extremities: No clubbing, no edema  Neuromuscular: Cranial nerves grossly intact, no focal deficits      RESULTS/DATA     Bicarbonate (mmol/L)   Date Value   05/07/2024 27   03/07/2023 26   11/25/2022 26   11/15/2022 27       PAP Adherence  A PAP adherence download was obtained and data was reviewed personally today in clinic.        ASSESSMENT/PLAN     Ms. Rowan is a 69 y.o. female and she returns in followup to the [facility] for OSWALDO.    Problem List, Orders, Assessment, Recommendations:  Problem List Items Addressed This Visit             ICD-10-CM    OSWALDO (obstructive sleep apnea) - Primary G47.33     -rAHI improved today, 5 vs 9 last visit; leak also improved   -not snoring with device, happy with therapy overall  -ran device in clinic per plan last visit, all looks good with no significant leak  -avoid drowsy driving          Relevant Orders    Positive Airway Pressure (PAP) Therapy       Disposition    Return to clinic in 12 months

## 2024-10-14 NOTE — ASSESSMENT & PLAN NOTE
-rAHI improved today, 5 vs 9 last visit; leak also improved   -not snoring with device, happy with therapy overall  -ran device in clinic per plan last visit, all looks good with no significant leak  -avoid drowsy driving

## 2024-10-21 ENCOUNTER — HOSPITAL ENCOUNTER (OUTPATIENT)
Dept: RADIOLOGY | Facility: CLINIC | Age: 69
Discharge: HOME | End: 2024-10-21
Payer: COMMERCIAL

## 2024-10-21 DIAGNOSIS — N20.0 RECURRENT NEPHROLITHIASIS: ICD-10-CM

## 2024-10-21 PROCEDURE — 74176 CT ABD & PELVIS W/O CONTRAST: CPT

## 2024-10-21 PROCEDURE — 74176 CT ABD & PELVIS W/O CONTRAST: CPT | Performed by: RADIOLOGY

## 2024-10-29 ENCOUNTER — APPOINTMENT (OUTPATIENT)
Dept: UROLOGY | Facility: CLINIC | Age: 69
End: 2024-10-29
Payer: COMMERCIAL

## 2024-10-29 VITALS — TEMPERATURE: 97.3 F

## 2024-10-29 DIAGNOSIS — N20.0 RECURRENT NEPHROLITHIASIS: Primary | ICD-10-CM

## 2024-10-29 PROCEDURE — 3048F LDL-C <100 MG/DL: CPT | Performed by: UROLOGY

## 2024-10-29 PROCEDURE — 99214 OFFICE O/P EST MOD 30 MIN: CPT | Performed by: UROLOGY

## 2024-10-29 PROCEDURE — 3044F HG A1C LEVEL LT 7.0%: CPT | Performed by: UROLOGY

## 2024-10-29 PROCEDURE — 1157F ADVNC CARE PLAN IN RCRD: CPT | Performed by: UROLOGY

## 2024-10-29 PROCEDURE — 1036F TOBACCO NON-USER: CPT | Performed by: UROLOGY

## 2024-10-29 PROCEDURE — 1159F MED LIST DOCD IN RCRD: CPT | Performed by: UROLOGY

## 2024-10-29 PROCEDURE — 1126F AMNT PAIN NOTED NONE PRSNT: CPT | Performed by: UROLOGY

## 2024-10-29 ASSESSMENT — PAIN SCALES - GENERAL: PAINLEVEL_OUTOF10: 0-NO PAIN

## 2024-10-30 ENCOUNTER — HOSPITAL ENCOUNTER (OUTPATIENT)
Dept: RADIOLOGY | Facility: HOSPITAL | Age: 69
Discharge: HOME | End: 2024-10-30
Payer: COMMERCIAL

## 2024-10-30 DIAGNOSIS — N20.0 RECURRENT NEPHROLITHIASIS: ICD-10-CM

## 2024-10-30 PROCEDURE — 74018 RADEX ABDOMEN 1 VIEW: CPT

## 2024-11-04 ENCOUNTER — HOSPITAL ENCOUNTER (OUTPATIENT)
Dept: RADIOLOGY | Facility: HOSPITAL | Age: 69
Discharge: HOME | End: 2024-11-04
Payer: COMMERCIAL

## 2024-11-04 ENCOUNTER — APPOINTMENT (OUTPATIENT)
Dept: ENDOCRINOLOGY | Facility: CLINIC | Age: 69
End: 2024-11-04
Payer: COMMERCIAL

## 2024-11-04 DIAGNOSIS — Z13.6 ENCOUNTER FOR SCREENING FOR CORONARY ARTERY DISEASE: ICD-10-CM

## 2024-11-04 PROCEDURE — 75571 CT HRT W/O DYE W/CA TEST: CPT

## 2024-11-06 ENCOUNTER — OFFICE VISIT (OUTPATIENT)
Dept: UROLOGY | Facility: CLINIC | Age: 69
End: 2024-11-06
Payer: COMMERCIAL

## 2024-11-06 VITALS — BODY MASS INDEX: 32.25 KG/M2 | HEIGHT: 59 IN | WEIGHT: 160 LBS | TEMPERATURE: 96.2 F

## 2024-11-06 DIAGNOSIS — Z01.818 PREOP TESTING: ICD-10-CM

## 2024-11-06 DIAGNOSIS — N20.0 LEFT RENAL STONE: Primary | ICD-10-CM

## 2024-11-06 PROCEDURE — 99214 OFFICE O/P EST MOD 30 MIN: CPT | Performed by: UROLOGY

## 2024-11-06 PROCEDURE — 3044F HG A1C LEVEL LT 7.0%: CPT | Performed by: UROLOGY

## 2024-11-06 PROCEDURE — 1159F MED LIST DOCD IN RCRD: CPT | Performed by: UROLOGY

## 2024-11-06 PROCEDURE — 3008F BODY MASS INDEX DOCD: CPT | Performed by: UROLOGY

## 2024-11-06 PROCEDURE — 3048F LDL-C <100 MG/DL: CPT | Performed by: UROLOGY

## 2024-11-06 PROCEDURE — 1126F AMNT PAIN NOTED NONE PRSNT: CPT | Performed by: UROLOGY

## 2024-11-06 PROCEDURE — 1157F ADVNC CARE PLAN IN RCRD: CPT | Performed by: UROLOGY

## 2024-11-06 RX ORDER — CIPROFLOXACIN 2 MG/ML
400 INJECTION, SOLUTION INTRAVENOUS ONCE
OUTPATIENT
Start: 2024-11-06 | End: 2024-11-06

## 2024-11-06 ASSESSMENT — PAIN SCALES - GENERAL: PAINLEVEL_OUTOF10: 0-NO PAIN

## 2024-11-06 NOTE — PROGRESS NOTES
Subjective   Susan Rowan is a 69 y.o. female with history of urge incontinence and recurrent nephrolithiasis s/p right ureteroscopy for 5mm stone in the right ureter at [facility], she presents today for a follow up visit to review KUB to assess stone opacity in anticipation of ESWL.     CT A&P from 10/21/2024 showed tiny 1-2 mm nonobstructing calculus in the  lower pole of the right kidney. Within the lower pole of the left kidney, there is a 6 mm nonobstructing calculus seen. Additionally, there is a 5 mm nonobstructing stone at the junction of the mid and lower poles of the left kidney.         Past Medical History:   Diagnosis Date    Encounter for full-term uncomplicated delivery      (spontaneous vaginal delivery)    Encounter for screening for malignant neoplasm of colon 2017    Colon cancer screening    Other conditions influencing health status 2018    History of cough    Other specified postprocedural states     History of surgery on arm    Overactive bladder     Pain in unspecified foot 2017    Acute foot pain    Personal history of other diseases of the circulatory system 2020    History of abnormal electrocardiography    Personal history of other diseases of the musculoskeletal system and connective tissue     History of arthritis    Personal history of other diseases of the respiratory system 2018    History of sinusitis    Personal history of other diseases of the respiratory system 2018    History of sore throat    Personal history of other specified conditions 2019    History of urinary urgency    Personal history of urinary calculi     History of renal calculi    Pure hypercholesterolemia, unspecified     High cholesterol    Varicella without complication     Varicella without complication     Past Surgical History:   Procedure Laterality Date    OTHER SURGICAL HISTORY  2020    Elbow surgery    OTHER SURGICAL HISTORY  2017    Repair Of Humerus /  Arm     Family History   Problem Relation Name Age of Onset    Arthritis Mother      Other (CARDIAC DISORDER) Mother      Other (CVA) Mother      Hyperlipidemia Mother      Osteoporosis Mother      Other (HOCM) Mother      Arthritis Father      Other (CARDIAC DISORDER) Father      Other (CEREBROVASCULAR ACCIDENT) Father      Hyperlipidemia Father      Other (MI) Father      Stroke Father      Hyperlipidemia Sister      Hyperlipidemia Brother      Anxiety disorder Other MAT GM     Depression Other MAT GM     Breast cancer Other MAT AUNT     Other (UTERUS CANCER) Other MAT AUNT      Current Outpatient Medications   Medication Sig Dispense Refill    aspirin 81 mg EC tablet Take 1 tablet (81 mg) by mouth once daily.      baclofen (Lioresal) 10 mg tablet Take 1 tablet (10 mg) by mouth 3 times a day. 270 tablet 2    busPIRone (Buspar) 10 mg tablet Take 2 tablets (20 mg) by mouth 2 times a day. 360 tablet 3    cholecalciferol (Vitamin D-3) 25 MCG (1000 UT) capsule Take 2 capsules (50 mcg) by mouth once daily.      ezetimibe (Zetia) 10 mg tablet Take 1 tablet (10 mg) by mouth once daily. 90 tablet 3    FLUoxetine (PROzac) 20 mg capsule TAKE ONE CAPSULE BY MOUTH ONCE DAILY 90 capsule 0    levothyroxine (Synthroid, Levoxyl) 75 mcg tablet Take 1 tablet (75 mcg) by mouth once daily. Take 1 tablet daily and double on sundays 90 tablet 3    mupirocin (Bactroban) 2 % ointment Apply topically once daily at bedtime. 15 g 1    omega-3 fatty acids-fish oil 340-1,000 mg capsule Take 1 capsule by mouth once daily in the evening.      potassium citrate CR (Urocit-K-10) 10 mEq ER tablet Take 1 tablet (10 mEq) by mouth 2 times a day with meals. Do not crush, chew, or split. 180 tablet 3    rosuvastatin (Crestor) 20 mg tablet Take 1 tablet (20 mg) by mouth once daily. 90 tablet 3     No current facility-administered medications for this visit.     Allergies   Allergen Reactions    Vancomycin Rash    Cephalexin Unknown and Hives     Penicillins Hives and Unknown    Ozempic [Semaglutide] Other     Myalgia, severe fatigue, vomiting    Tolterodine Unknown, GI Upset, Other and Nausea/vomiting     Social History     Socioeconomic History    Marital status:      Spouse name: Not on file    Number of children: Not on file    Years of education: Not on file    Highest education level: Not on file   Occupational History    Not on file   Tobacco Use    Smoking status: Never     Passive exposure: Never    Smokeless tobacco: Never   Vaping Use    Vaping status: Never Used   Substance and Sexual Activity    Alcohol use: Yes     Alcohol/week: 1.0 standard drink of alcohol     Types: 1 Shots of liquor per week    Drug use: Never    Sexual activity: Yes   Other Topics Concern    Not on file   Social History Narrative    Not on file     Social Drivers of Health     Financial Resource Strain: Not on file   Food Insecurity: Not on file   Transportation Needs: Not on file   Physical Activity: Not on file   Stress: Not on file   Social Connections: Not on file   Intimate Partner Violence: Not on file   Housing Stability: Not on file       Review of Systems  Pertinent items are noted in HPI.    Objective       Lab Review  Lab Results   Component Value Date    WBC 7.0 05/07/2024    RBC 4.84 05/07/2024    HGB 14.8 05/07/2024    HCT 43.9 05/07/2024     05/07/2024      Lab Results   Component Value Date    BUN 17 05/07/2024    CREATININE 0.83 05/07/2024          Assessment/Plan   There are no diagnoses linked to this encounter.  Nephrolithiasis     I personally reviewed KUB from 10/30/2024 which showed small left lower pole renal calculus unchanged.    We discussed proceeding with left ESWL.     Risks of extracorporeal shock wave lithotripsy (ESWL) were discussed with the patient in great detail. Patient was advised to refrain from blood thinners and nonsteroidal inflammatory medication one week prior. Risks of hematuria, sepsis, renal damage, renal  hematoma, failure of stone fragmentation and risk of ureteral obstruction with stone fragments requiring additional surgical intervention were discussed.       All questions were answered to the patient's satisfaction. Patient agrees with the plan and wishes to proceed. Follow-up will be scheduled appropriately.     Scribed for Dr. Velasquez by Elva Roach . I , Dr Velasquez, have personally reviewed and agreed with the information entered by the Virtual Scribe.

## 2024-11-07 ENCOUNTER — TELEPHONE (OUTPATIENT)
Dept: CARDIOLOGY | Facility: CLINIC | Age: 69
End: 2024-11-07
Payer: COMMERCIAL

## 2024-11-07 DIAGNOSIS — E78.00 PURE HYPERCHOLESTEROLEMIA: ICD-10-CM

## 2024-11-07 RX ORDER — ROSUVASTATIN CALCIUM 40 MG/1
40 TABLET, COATED ORAL DAILY
Qty: 90 TABLET | Refills: 3 | Status: SHIPPED | OUTPATIENT
Start: 2024-11-07 | End: 2025-11-07

## 2024-11-07 NOTE — TELEPHONE ENCOUNTER
Spoke with patient, reviewed coronary calcium score.  She is already on combination therapy with rosuvastatin and ezetimibe.  Last LDL cholesterol slightly above goal.  She has no perceived side effects.  Have advised patient to increase rosuvastatin to 40 mg daily, continue ezetimibe, and follow-up in the office for an annual visit in March, 2025.  We can recheck a lipid panel then if not obtained by her primary care provider.

## 2024-11-12 ENCOUNTER — TELEMEDICINE (OUTPATIENT)
Dept: OTOLARYNGOLOGY | Facility: CLINIC | Age: 69
End: 2024-11-12
Payer: COMMERCIAL

## 2024-11-12 VITALS — WEIGHT: 160 LBS | HEIGHT: 59 IN | BODY MASS INDEX: 32.25 KG/M2

## 2024-11-12 DIAGNOSIS — R05.3 CHRONIC COUGH: ICD-10-CM

## 2024-11-12 DIAGNOSIS — R13.10 DYSPHAGIA, UNSPECIFIED TYPE: Primary | ICD-10-CM

## 2024-11-12 DIAGNOSIS — R09.89 CHRONIC THROAT CLEARING: ICD-10-CM

## 2024-11-12 PROCEDURE — 1157F ADVNC CARE PLAN IN RCRD: CPT | Performed by: OTOLARYNGOLOGY

## 2024-11-12 PROCEDURE — 1036F TOBACCO NON-USER: CPT | Performed by: OTOLARYNGOLOGY

## 2024-11-12 PROCEDURE — 3044F HG A1C LEVEL LT 7.0%: CPT | Performed by: OTOLARYNGOLOGY

## 2024-11-12 PROCEDURE — 1126F AMNT PAIN NOTED NONE PRSNT: CPT | Performed by: OTOLARYNGOLOGY

## 2024-11-12 PROCEDURE — 3048F LDL-C <100 MG/DL: CPT | Performed by: OTOLARYNGOLOGY

## 2024-11-12 PROCEDURE — 1159F MED LIST DOCD IN RCRD: CPT | Performed by: OTOLARYNGOLOGY

## 2024-11-12 PROCEDURE — 3008F BODY MASS INDEX DOCD: CPT | Performed by: OTOLARYNGOLOGY

## 2024-11-12 PROCEDURE — 99443 PR PHYS/QHP TELEPHONE EVALUATION 21-30 MIN: CPT | Performed by: OTOLARYNGOLOGY

## 2024-11-12 PROCEDURE — 1160F RVW MEDS BY RX/DR IN RCRD: CPT | Performed by: OTOLARYNGOLOGY

## 2024-11-12 ASSESSMENT — PATIENT HEALTH QUESTIONNAIRE - PHQ9
1. LITTLE INTEREST OR PLEASURE IN DOING THINGS: NOT AT ALL
2. FEELING DOWN, DEPRESSED OR HOPELESS: NOT AT ALL
SUM OF ALL RESPONSES TO PHQ9 QUESTIONS 1 AND 2: 0

## 2024-11-12 ASSESSMENT — PAIN SCALES - GENERAL: PAINLEVEL_OUTOF10: 0-NO PAIN

## 2024-11-12 NOTE — PROGRESS NOTES
Patient: Susan Rowan   MRN: [id number] YOB: 1955   Sex: female Age: 69 y.o.  Date of Service: 2024       ASSESSMENT AND PLAN  I discussed the findings with Susan Rowan and have recommended the followin. Dysphagia, improving. MBS with CP web. We discussed options for dysphagia if it was to reoccur  - Follow-up as needed      CHIEF COMPLAINT  Chief Complaint   Patient presents with    Dysphagia    Follow-up       HISTORY OF PRESENT ILLNESS  Susan Rowan is a 69 y.o. female referred by Boyd Griffiths MD for evaluation of dysphagia and coughing.  The patient reports she has always had issues throat clearing, improving on Baclofen.     24  She reports she is overall feeling better. She did have issues swallowing but that has been getting better since she got her MBS that did not show significant issues. She has occasional heartburn but will take Gaviscon which works.    Hi's note 24  Presents for follow-up for chronic cough. She has been using Baclofen 3x day with significant improvement and has also been utilizing Gaviscon. We recommended that she discontinue use of PPI as it has not been helping. We also suggested adding nasal saline as well as Flonase  The patient reports for the last month she has had difficulty with swallowing solids more than liquids. Especially over the the last week she feels like there is something stuck in her throat. She has not noted that one food is worse than another.   She will have spells of coughing, that leads to retching and vomiting. She thinks it occurs more at night. She explains that she will take a breath in and it goes the wrong way and begins gagging. She also notes her voice has been raspy.       ADDITIONAL HISTORY  Past Medical History  She has a past medical history of Encounter for full-term uncomplicated delivery, Encounter for screening for malignant neoplasm of colon (2017), Other conditions influencing health  status (05/25/2018), Other specified postprocedural states, Overactive bladder, Pain in unspecified foot (09/19/2017), Personal history of other diseases of the circulatory system (03/06/2020), Personal history of other diseases of the musculoskeletal system and connective tissue, Personal history of other diseases of the respiratory system (05/16/2018), Personal history of other diseases of the respiratory system (05/16/2018), Personal history of other specified conditions (06/13/2019), Personal history of urinary calculi, Pure hypercholesterolemia, unspecified, and Varicella without complication. Surgical History  She has a past surgical history that includes Other surgical history (03/06/2020) and Other surgical history (03/22/2017).   Social History  She reports that she has never smoked. She has never been exposed to tobacco smoke. She has never used smokeless tobacco. She reports current alcohol use of about 1.0 standard drink of alcohol per week. She reports that she does not use drugs. Allergies  Vancomycin, Cephalexin, Penicillins, Ozempic [semaglutide], and Tolterodine     Family History  Family History   Problem Relation Name Age of Onset    Arthritis Mother      Other (CARDIAC DISORDER) Mother      Other (CVA) Mother      Hyperlipidemia Mother      Osteoporosis Mother      Other (HOCM) Mother      Arthritis Father      Other (CARDIAC DISORDER) Father      Other (CEREBROVASCULAR ACCIDENT) Father      Hyperlipidemia Father      Other (MI) Father      Stroke Father      Hyperlipidemia Sister      Hyperlipidemia Brother      Anxiety disorder Other MAT GM     Depression Other MAT GM     Breast cancer Other MAT AUNT     Other (UTERUS CANCER) Other MAT AUNT         REVIEW OF SYSTEMS  All 10 systems were reviewed and negative except for above.      PHYSICAL EXAM  ENT Physical Exam   GENERAL: Well-nourished and developed, alert and appropriate, no distress, voice [id number]  RESPIRATORY: Breathing quietly, no  "stridor  HEAD: Normocephalic atraumatic  FACE: Symmetric, no masses or lesions  EYES:  Pupils reactive, sclera clear, external ocular muscles intact, no nystagmus.    EARS:  Pinnae normal. External auditory canals clear and tympanic membranes intact.  NOSE:  No anterior lesions, masses or polyps.  ORAL CAVITY/OROPHARYNX:  Buccal mucosa is moist without lesions or masses, tongue midline and palate elevates symmetrically. Tongue mobility intact.  NECK:  Soft. There is no lymphadenopathy or thyromegaly.    NEUROLOGIC:  Cranial nerves II-XII grossly intact.       Last Recorded Vitals  Height 1.499 m (4' 11\"), weight 72.6 kg (160 lb), not currently breastfeeding.    RESULTS    Patient Reported Outcome Measures  N/A    Laboratory, Radiology, and Pathology  I personally reviewed the following results, with the following interpretation:   MBS 9/19/24 - small CP web        PROCEDURES  None    ----------------------------------------------------------------------  Yanique Davis MD, MAEd    [facility]  Department of Otolaryngology - Head and Neck Surgery  [facility]    The total time I spent in care of this patient today (excluding time spent on other billable services) is as follows:    Time Spent  Prep time on day of patient encounter: 5 minutes  Time spent directly with patient, family or caregiver: 10 minutes  Additional Time Spent on Patient Care Activities: 5 minutes  Documentation Time: 5 minutes  Other Time Spent: 0 minutes  Total: 25 minutes         Virtual or Telephone Consent    An interactive audio and video telecommunication system which permits real time communications between the patient (at the originating site) and provider (at the distant site) was utilized to provide this telehealth service.   Verbal consent was requested and obtained from Susan Rowan on this date, 11/12/24 for a telehealth visit.     "

## 2024-11-25 DIAGNOSIS — F41.9 ANXIETY: ICD-10-CM

## 2024-11-27 ENCOUNTER — APPOINTMENT (OUTPATIENT)
Dept: OTOLARYNGOLOGY | Facility: HOSPITAL | Age: 69
End: 2024-11-27

## 2024-11-27 RX ORDER — FLUOXETINE HYDROCHLORIDE 20 MG/1
20 CAPSULE ORAL DAILY
Qty: 90 CAPSULE | Refills: 0 | Status: SHIPPED | OUTPATIENT
Start: 2024-11-27

## 2024-12-02 ENCOUNTER — PRE-ADMISSION TESTING (OUTPATIENT)
Dept: PREADMISSION TESTING | Facility: HOSPITAL | Age: 69
End: 2024-12-02
Payer: COMMERCIAL

## 2024-12-02 VITALS
HEART RATE: 70 BPM | BODY MASS INDEX: 32.46 KG/M2 | TEMPERATURE: 97.3 F | DIASTOLIC BLOOD PRESSURE: 71 MMHG | WEIGHT: 165.34 LBS | RESPIRATION RATE: 14 BRPM | HEIGHT: 60 IN | OXYGEN SATURATION: 97 % | SYSTOLIC BLOOD PRESSURE: 112 MMHG

## 2024-12-02 DIAGNOSIS — Z01.818 PREOP TESTING: ICD-10-CM

## 2024-12-02 DIAGNOSIS — E11.9 TYPE 2 DIABETES MELLITUS WITHOUT COMPLICATION, WITHOUT LONG-TERM CURRENT USE OF INSULIN (MULTI): ICD-10-CM

## 2024-12-02 DIAGNOSIS — J04.0 REFLUX LARYNGITIS: ICD-10-CM

## 2024-12-02 DIAGNOSIS — E78.00 PURE HYPERCHOLESTEROLEMIA: ICD-10-CM

## 2024-12-02 DIAGNOSIS — K21.9 REFLUX LARYNGITIS: ICD-10-CM

## 2024-12-02 DIAGNOSIS — R53.82 CHRONIC FATIGUE: ICD-10-CM

## 2024-12-02 DIAGNOSIS — N20.0 LEFT RENAL STONE: ICD-10-CM

## 2024-12-02 DIAGNOSIS — E55.9 VITAMIN D DEFICIENCY: ICD-10-CM

## 2024-12-02 DIAGNOSIS — E03.9 HYPOTHYROIDISM, UNSPECIFIED TYPE: ICD-10-CM

## 2024-12-02 LAB
ALBUMIN SERPL BCP-MCNC: 4 G/DL (ref 3.4–5)
ALP SERPL-CCNC: 54 U/L (ref 33–136)
ALT SERPL W P-5'-P-CCNC: 20 U/L (ref 7–45)
ANION GAP SERPL CALC-SCNC: 12 MMOL/L (ref 10–20)
APPEARANCE UR: CLEAR
AST SERPL W P-5'-P-CCNC: 17 U/L (ref 9–39)
ATRIAL RATE: 68 BPM
BASOPHILS # BLD AUTO: 0.02 X10*3/UL (ref 0–0.1)
BASOPHILS NFR BLD AUTO: 0.4 %
BILIRUB SERPL-MCNC: 0.8 MG/DL (ref 0–1.2)
BILIRUB UR STRIP.AUTO-MCNC: NEGATIVE MG/DL
BUN SERPL-MCNC: 21 MG/DL (ref 6–23)
CALCIUM SERPL-MCNC: 9.4 MG/DL (ref 8.6–10.3)
CHLORIDE SERPL-SCNC: 105 MMOL/L (ref 98–107)
CO2 SERPL-SCNC: 27 MMOL/L (ref 21–32)
COLOR UR: YELLOW
CREAT SERPL-MCNC: 0.83 MG/DL (ref 0.5–1.05)
EGFRCR SERPLBLD CKD-EPI 2021: 77 ML/MIN/1.73M*2
EOSINOPHIL # BLD AUTO: 0.14 X10*3/UL (ref 0–0.7)
EOSINOPHIL NFR BLD AUTO: 2.6 %
ERYTHROCYTE [DISTWIDTH] IN BLOOD BY AUTOMATED COUNT: 11.9 % (ref 11.5–14.5)
GLUCOSE SERPL-MCNC: 120 MG/DL (ref 74–99)
GLUCOSE UR STRIP.AUTO-MCNC: NEGATIVE MG/DL
HCT VFR BLD AUTO: 42.6 % (ref 36–46)
HGB BLD-MCNC: 13.9 G/DL (ref 12–16)
IMM GRANULOCYTES # BLD AUTO: 0.01 X10*3/UL (ref 0–0.7)
IMM GRANULOCYTES NFR BLD AUTO: 0.2 % (ref 0–0.9)
INR PPP: 1 (ref 0.9–1.2)
KETONES UR STRIP.AUTO-MCNC: NEGATIVE MG/DL
LEUKOCYTE ESTERASE UR QL STRIP.AUTO: ABNORMAL
LYMPHOCYTES # BLD AUTO: 2.04 X10*3/UL (ref 1.2–4.8)
LYMPHOCYTES NFR BLD AUTO: 38.1 %
MCH RBC QN AUTO: 30.5 PG (ref 26–34)
MCHC RBC AUTO-ENTMCNC: 32.6 G/DL (ref 32–36)
MCV RBC AUTO: 93 FL (ref 80–100)
MONOCYTES # BLD AUTO: 0.48 X10*3/UL (ref 0.1–1)
MONOCYTES NFR BLD AUTO: 9 %
NEUTROPHILS # BLD AUTO: 2.66 X10*3/UL (ref 1.2–7.7)
NEUTROPHILS NFR BLD AUTO: 49.7 %
NITRITE UR QL STRIP.AUTO: NEGATIVE
NRBC BLD-RTO: NORMAL /100{WBCS}
P AXIS: 29 DEGREES
P OFFSET: 198 MS
P ONSET: 147 MS
PH UR STRIP.AUTO: 6.5 [PH]
PLATELET # BLD AUTO: 227 X10*3/UL (ref 150–450)
POTASSIUM SERPL-SCNC: 4 MMOL/L (ref 3.5–5.3)
PR INTERVAL: 160 MS
PROT SERPL-MCNC: 6.3 G/DL (ref 6.4–8.2)
PROT UR STRIP.AUTO-MCNC: NEGATIVE MG/DL
PROTHROMBIN TIME: 9.7 SECONDS (ref 9.3–12.7)
Q ONSET: 227 MS
QRS COUNT: 11 BEATS
QRS DURATION: 84 MS
QT INTERVAL: 440 MS
QTC CALCULATION(BAZETT): 467 MS
QTC FREDERICIA: 459 MS
R AXIS: -21 DEGREES
RBC # BLD AUTO: 4.56 X10*6/UL (ref 4–5.2)
RBC # UR STRIP.AUTO: NEGATIVE /UL
RBC #/AREA URNS AUTO: NORMAL /HPF
SODIUM SERPL-SCNC: 140 MMOL/L (ref 136–145)
SP GR UR STRIP.AUTO: 1.02
SQUAMOUS #/AREA URNS AUTO: NORMAL /HPF
T AXIS: 33 DEGREES
T OFFSET: 447 MS
TRANS CELLS #/AREA UR COMP ASSIST: NORMAL /HPF
UROBILINOGEN UR STRIP.AUTO-MCNC: 0.2 MG/DL
VENTRICULAR RATE: 68 BPM
WBC # BLD AUTO: 5.4 X10*3/UL (ref 4.4–11.3)
WBC #/AREA URNS AUTO: NORMAL /HPF

## 2024-12-02 PROCEDURE — 36415 COLL VENOUS BLD VENIPUNCTURE: CPT

## 2024-12-02 PROCEDURE — 85610 PROTHROMBIN TIME: CPT

## 2024-12-02 PROCEDURE — 87086 URINE CULTURE/COLONY COUNT: CPT | Mod: BEALAB

## 2024-12-02 PROCEDURE — 85025 COMPLETE CBC W/AUTO DIFF WBC: CPT

## 2024-12-02 PROCEDURE — 81001 URINALYSIS AUTO W/SCOPE: CPT

## 2024-12-02 PROCEDURE — 93005 ELECTROCARDIOGRAM TRACING: CPT

## 2024-12-02 PROCEDURE — 93010 ELECTROCARDIOGRAM REPORT: CPT | Performed by: INTERNAL MEDICINE

## 2024-12-02 PROCEDURE — 80053 COMPREHEN METABOLIC PANEL: CPT

## 2024-12-02 ASSESSMENT — DUKE ACTIVITY SCORE INDEX (DASI)
CAN YOU DO HEAVY WORK AROUND THE HOUSE LIKE SCRUBBING FLOORS OR LIFTING AND MOVING HEAVY FURNITURE: YES
CAN YOU DO LIGHT WORK AROUND THE HOUSE LIKE DUSTING OR WASHING DISHES: YES
CAN YOU HAVE SEXUAL RELATIONS: YES
CAN YOU TAKE CARE OF YOURSELF (EAT, DRESS, BATHE, OR USE TOILET): YES
CAN YOU PARTICIPATE IN STRENOUS SPORTS LIKE SWIMMING, SINGLES TENNIS, FOOTBALL, BASKETBALL, OR SKIING: NO
CAN YOU DO MODERATE WORK AROUND THE HOUSE LIKE VACUUMING, SWEEPING FLOORS OR CARRYING GROCERIES: YES
DASI METS SCORE: 9
CAN YOU WALK A BLOCK OR TWO ON LEVEL GROUND: YES
CAN YOU CLIMB A FLIGHT OF STAIRS OR WALK UP A HILL: YES
CAN YOU DO YARD WORK LIKE RAKING LEAVES, WEEDING OR PUSHING A MOWER: YES
CAN YOU WALK INDOORS, SUCH AS AROUND YOUR HOUSE: YES
CAN YOU RUN A SHORT DISTANCE: YES
CAN YOU PARTICIPATE IN MODERATE RECREATIONAL ACTIVITIES LIKE GOLF, BOWLING, DANCING, DOUBLES TENNIS OR THROWING A BASEBALL OR FOOTBALL: YES
TOTAL_SCORE: 50.7

## 2024-12-02 ASSESSMENT — PAIN - FUNCTIONAL ASSESSMENT: PAIN_FUNCTIONAL_ASSESSMENT: 0-10

## 2024-12-02 ASSESSMENT — PAIN SCALES - GENERAL: PAINLEVEL_OUTOF10: 0 - NO PAIN

## 2024-12-02 NOTE — H&P (VIEW-ONLY)
CPM/PAT Evaluation       Name: Susan Rowan (Susan Rowan)  /Age: 1955/ y.o.     In-Person       Chief Complaint: Left renal stone     HPI  Patient is an alert and oriented 69 year old female scheduled for a  ESWL on 24 with Dr. Velasquez for  Left renal stone . PMHX includes OSWALDO, HLD, hypothyroidism, DM2. Presents to [facility] PAT today for perioperative risk stratification and optimization.       Past Medical History:   Diagnosis Date    Encounter for full-term uncomplicated delivery      (spontaneous vaginal delivery)    Encounter for screening for malignant neoplasm of colon 2017    Colon cancer screening    Other conditions influencing health status 2018    History of cough    Other specified postprocedural states     History of surgery on arm    Overactive bladder     Pain in unspecified foot 2017    Acute foot pain    Personal history of other diseases of the circulatory system 2020    History of abnormal electrocardiography    Personal history of other diseases of the musculoskeletal system and connective tissue     History of arthritis    Personal history of other diseases of the respiratory system 2018    History of sinusitis    Personal history of other diseases of the respiratory system 2018    History of sore throat    Personal history of other specified conditions 2019    History of urinary urgency    Personal history of urinary calculi     History of renal calculi    Pure hypercholesterolemia, unspecified     High cholesterol    Sleep apnea     Varicella without complication     Varicella without complication       Past Surgical History:   Procedure Laterality Date    OTHER SURGICAL HISTORY  2020    Elbow surgery    OTHER SURGICAL HISTORY  2017    Repair Of Humerus / Arm       Patient  reports being sexually active.    Family History   Problem Relation Name Age of Onset    Arthritis Mother      Other (CARDIAC DISORDER) Mother      Other  (CVA) Mother      Hyperlipidemia Mother      Osteoporosis Mother      Other (HOCM) Mother      Arthritis Father      Other (CARDIAC DISORDER) Father      Other (CEREBROVASCULAR ACCIDENT) Father      Hyperlipidemia Father      Other (MI) Father      Stroke Father      Hyperlipidemia Sister      Hyperlipidemia Brother      Anxiety disorder Other MAT GM     Depression Other MAT GM     Breast cancer Other MAT AUNT     Other (UTERUS CANCER) Other MAT AUNT        Allergies   Allergen Reactions    Vancomycin Rash    Cephalexin Unknown and Hives    Penicillins Hives and Unknown    Ozempic [Semaglutide] Other     Myalgia, severe fatigue, vomiting    Tolterodine Unknown, GI Upset, Other and Nausea/vomiting       Prior to Admission medications    Medication Sig Start Date End Date Taking? Authorizing Provider   aspirin 81 mg EC tablet Take 1 tablet (81 mg) by mouth once daily. 3/18/15   Historical Provider, MD   baclofen (Lioresal) 10 mg tablet Take 1 tablet (10 mg) by mouth 3 times a day. 7/17/24   Boyd Do MD   busPIRone (Buspar) 10 mg tablet Take 2 tablets (20 mg) by mouth 2 times a day. 5/21/24 5/21/25  JOSE Alva   cholecalciferol (Vitamin D-3) 25 MCG (1000 UT) capsule Take 2 capsules (50 mcg) by mouth once daily.    Historical Provider, MD   ezetimibe (Zetia) 10 mg tablet Take 1 tablet (10 mg) by mouth once daily. 5/21/24 5/21/25  Mp Andersen MD   FLUoxetine (PROzac) 20 mg capsule TAKE ONE CAPSULE BY MOUTH ONCE DAILY 11/27/24   JOSE Alva   levothyroxine (Synthroid, Levoxyl) 75 mcg tablet Take 1 tablet (75 mcg) by mouth once daily. Take 1 tablet daily and double on sundays 4/25/24 4/25/25  JOSE Alva   mupirocin (Bactroban) 2 % ointment Apply topically once daily at bedtime.  Patient not taking: Reported on 11/12/2024 8/29/24 11/27/24  Boyd Do MD   omega-3 fatty acids-fish oil 340-1,000 mg capsule Take 1 capsule by mouth once daily in the evening.  4/13/15   Historical Provider, MD   potassium citrate CR (Urocit-K-10) 10 mEq ER tablet Take 1 tablet (10 mEq) by mouth 2 times a day with meals. Do not crush, chew, or split. 1/24/24 1/23/25  Ann Velasquez MD   rosuvastatin (Crestor) 40 mg tablet Take 1 tablet (40 mg) by mouth once daily.  Patient taking differently: Take 50 mg by mouth once daily. 11/7/24 11/7/25  Mp Andersen MD   FLUoxetine (PROzac) 20 mg capsule TAKE ONE CAPSULE BY MOUTH ONCE DAILY 8/19/24 11/27/24  Divya Aaron APRN-CNP      Visit Vitals  /71   Pulse 70   Temp 36.3 °C (97.3 °F) (Temporal)   Resp 14   Ht 1.524 m (5')   Wt 75 kg (165 lb 5.5 oz)   LMP  (LMP Unknown)   SpO2 97%   BMI 32.29 kg/m²   OB Status Postmenopausal   Smoking Status Never   BSA 1.78 m²         Review of Systems   Constitutional: Negative for chills, decreased appetite, diaphoresis, fever and malaise/fatigue.   Eyes:  Negative for blurred vision and double vision.   Cardiovascular:  Negative for chest pain, claudication, cyanosis, dyspnea on exertion, irregular heartbeat, leg swelling, near-syncope and palpitations.   Respiratory:  Negative for cough, hemoptysis, shortness of breath and wheezing.    Endocrine: Negative for cold intolerance, heat intolerance, polydipsia, polyphagia and polyuria.   Gastrointestinal:  Negative for abdominal pain, constipation, diarrhea, dysphagia, nausea and vomiting.   Genitourinary:  Negative for bladder incontinence, dysuria, hematuria, incomplete emptying, + nocturia, frequency, pelvic pain and urgency.   Neurological:  Negative for headaches, light-headedness, paresthesias, sensory change and weakness.   Psychiatric/Behavioral:  Negative for altered mental status.    Musculoskeletal: Negative for myalgias, arthralgias     Vitals and nursing note reviewed.     Physical exam  Constitutional:       Appearance: Normal appearance. She is Obese.   HENT:      Head: Normocephalic and atraumatic.      Mouth/Throat:      Mouth: Mucous  membranes are moist.      Pharynx: Oropharynx is clear.   Eyes:      Extraocular Movements: Extraocular movements intact.      Conjunctiva/sclera: Conjunctivae normal.      Pupils: Pupils are equal, round, and reactive to light.   Cardiovascular:      PMI at left midclavicular line. Normal rate. Regular rhythm. Normal S1. Normal S2.       Murmurs: There is no murmur.      No gallop.  No click. No rub.       No audible carotid bruit     No lower extremity edema on exam  Pulmonary:      Effort: Pulmonary effort is normal.      Breath sounds: Normal breath sounds.   Abdominal:      General: Abdomen is flat. Bowel sounds are normal.      Palpations: Abdomen is soft and non-tender  Musculoskeletal:      Cervical back: Normal range of motion and neck supple.   Skin:     General: Skin is warm and dry.      Capillary Refill: Capillary refill takes less than 2 seconds.   Neurological:      General: No focal deficit present.      Mental Status: She is alert and oriented to person, place, and time. Mental status is at baseline.   Psychiatric:         Mood and Affect: Mood normal.         Behavior: Behavior normal.         Thought Content: Thought content normal.         Judgment: Judgment normal.     Vitals and nursing note reviewed. Physical exam within normal limits.   DASI Risk Score      Flowsheet Row Pre-Admission Testing from 12/2/2024 in [facility]   Can you take care of yourself (eat, dress, bathe, or use toilet)?  2.75 filed at 12/02/2024 0822   Can you walk indoors, such as around your house? 1.75 filed at 12/02/2024 0822   Can you walk a block or two on level ground?  2.75 filed at 12/02/2024 0822   Can you climb a flight of stairs or walk up a hill? 5.5 filed at 12/02/2024 0822   Can you run a short distance? 8 filed at 12/02/2024 0822   Can you do light work around the house like dusting or washing dishes? 2.7 filed at 12/02/2024 0822   Can you do moderate work around the house like vacuuming,  sweeping floors or carrying groceries? 3.5 filed at 12/02/2024 0822   Can you do heavy work around the house like scrubbing floors or lifting and moving heavy furniture?  8 filed at 12/02/2024 0822   Can you do yard work like raking leaves, weeding or pushing a mower? 4.5 filed at 12/02/2024 0822   Can you have sexual relations? 5.25 filed at 12/02/2024 0822   Can you participate in moderate recreational activities like golf, bowling, dancing, doubles tennis or throwing a baseball or football? 6 filed at 12/02/2024 0822   Can you participate in strenous sports like swimming, singles tennis, football, basketball, or skiing? 0 filed at 12/02/2024 0822   DASI SCORE 50.7 filed at 12/02/2024 0822   METS Score (Will be calculated only when all the questions are answered) 9 filed at 12/02/2024 0822          Caprini DVT Assessment      Flowsheet Row Pre-Admission Testing from 12/2/2024 in [facility]   DVT Score 7 filed at 12/02/2024 0820   Surgical Factors Major surgery planned, including arthroscopic and laproscopic (1-2 hours) filed at 12/02/2024 0820   BMI 31-40 (Obesity) filed at 12/02/2024 0820          Modified Frailty Index    No data to display       CHADS2 Stroke Risk  Current as of 7 minutes ago        N/A 3 to 100%: High Risk   2 to < 3%: Medium Risk   0 to < 2%: Low Risk     Last Change: N/A          This score determines the patient's risk of having a stroke if the patient has atrial fibrillation.        This score is not applicable to this patient. Components are not calculated.          Revised Cardiac Risk Index      Flowsheet Row Pre-Admission Testing from 12/2/2024 in [facility]   High-Risk Surgery (Intraperitoneal, Intrathoracic,Suprainguinal vascular) 0 filed at 12/02/2024 0822   History of ischemic heart disease (History of MI, History of positive exercuse test, Current chest paint considered due to myocardial ischemia, Use of nitrate therapy, ECG with pathological Q  Waves) 0 filed at 12/02/2024 0822   History of congestive heart failure (pulmonary edemia, bilateral rales or S3 gallop, Paroxysmal nocturnal dyspnea, CXR showing pulmonary vascular redistribution) 0 filed at 12/02/2024 0822   History of cerebrovascular disease (Prior TIA or stroke) 0 filed at 12/02/2024 0822   Pre-operative insulin treatment 0 filed at 12/02/2024 0822   Pre-operative creatinine>2 mg/dl 0 filed at 12/02/2024 0822   Revised Cardiac Risk Calculator 0 filed at 12/02/2024 0822          Apfel Simplified Score    No data to display       Risk Analysis Index Results This Encounter    No data found in the last 10 encounters.       Stop Bang Score      Flowsheet Row Pre-Admission Testing from 12/2/2024 in [facility]   Do you snore loudly? 1  [not when using CPAP] filed at 12/02/2024 0748   Do you often feel tired or fatigued after your sleep? 0 filed at 12/02/2024 0748   Has anyone ever observed you stop breathing in your sleep? 1 filed at 12/02/2024 0748   Do you have or are you being treated for high blood pressure? 0 filed at 12/02/2024 0748   Recent BMI (Calculated) 32.3 filed at 12/02/2024 0748   Is BMI greater than 35 kg/m2? 0=No filed at 12/02/2024 0748   Age older than 50 years old? 1=Yes filed at 12/02/2024 0748   Is your neck circumference greater than 17 inches (Male) or 16 inches (Female)? 0 filed at 12/02/2024 0748   Gender - Male 0=No filed at 12/02/2024 0748   STOP-BANG Total Score 3 filed at 12/02/2024 0748          Prodigy: High Risk  Total Score: 8              Prodigy Age Score           ARISCAT Score for Postoperative Pulmonary Complications      Flowsheet Row Pre-Admission Testing from 12/2/2024 in [facility]   Age, years  3 filed at 12/02/2024 0823   Preoperative SpO2 0 filed at 12/02/2024 0823   Respiratory infection in the last month Either upper or lower (i.e., URI, bronchitis, pneumonia), with fever and antibiotic treatment 0 filed at 12/02/2024  0823   Preoperative anemoa (Hgb less than 10 g/dl) 0 filed at 12/02/2024 0823   Surgical incision  0 filed at 12/02/2024 0823   Duration of surgery  0 filed at 12/02/2024 0823   Emergency Procedure  0 filed at 12/02/2024 0823   ARISCAT Total Score  3 filed at 12/02/2024 0823          Ynoi Perioperative Risk for Myocardial Infarction or Cardiac Arrest (ALEXIS)      Flowsheet Row Pre-Admission Testing from 12/2/2024 in [facility]   Age 1.37 filed at 12/02/2024 0823   Functional Status  0 filed at 12/02/2024 0823   ASA Class  -3.29 filed at 12/02/2024 0823   Creatinine 0 filed at 12/02/2024 0823   Type of Procedure  -0.26 filed at 12/02/2024 0823   ALEXIS Total Score  -7.44 filed at 12/02/2024 0823   ALEXIS % 0.06 filed at 12/02/2024 0823          Assessment & Plan:    Neuro:  No diagnosis or significant findings on chart review or clinical presentation and evaluation.     HEENT/Airway:  OSWALDO on CPAP   STOP-BANG Score- pt has OSWALDO and uses CPAP     Mallampati::  II    TM distance::  >3 FB    Neck ROM::  Full  Dentures-denies  Crowns-reports  Implants-denies    Cardiovascular:  Septal hypertrophy with recent workup from cardiology. HTN, HLD   METS: 9  RCRI: 0 points, 3.9%  risk for postoperative MACE   ALEXIS: 0.06% risk for postoperative MACE  EKG -unchanged from previous tracings Rate- 68 No acute changes  Echo 3/18/24    1. Left ventricular systolic function is normal with a 65% estimated ejection fraction.   2. Spectral Doppler shows an impaired relaxation pattern of left ventricular diastolic filling.   3. Asymmetric hypertrophy of the interventricular septum (maximal anteroseptal thickness 1.4 cm).   4. No systolic anterior motion of the mitral valve/left ventricular outflow tract obstruction seen at rest or with Valsalva.   5. No significant changes compared to prior echocardiogram dated 9/9/2022.      Pulmonary:  No diagnosis or significant findings on chart review or clinical presentation and  evaluation.   ARISCAT: <26 points, 1.6% risk of in-hospital postoperative pulmonary complication  PRODIGY: Low risk for opioid induced respiratory depression  Smoking History-She has never smoked.  Discussed smoking cessation and deep breathing handout given    Renal/Urinary:    Renal stone however, the patient is at increased risk of perioperative renal complications secondary to age>/= 57. Preventative measures include BP monitoring, medication compliance, and hydration management.   CMP-reviewed  Creatinine-0.83  GFR-77    Endocrine:  DM2 diet controlled. Hypothyroidism (levothyroxine)    Hematologic/Immunology:  No diagnosis or significant findings on chart review or clinical presentation and evaluation.  The patient is not a Jehovah’s witness and will accept blood and blood products if medically indicated.   History of previous blood transfusions No  CBC-reviewed  H/H-13.9/42.6  Caprini Score 7, patient at Moderate for postoperative DVT. Pt supplied education/VTE handout  Anticoagulation use: Yes     Gastrointestinal:   No diagnosis or significant findings on chart review or clinical presentation and evaluation.   Recreational drug use: Drug use No  Alcohol use social drinker    Infectious disease:   No diagnosis or significant findings on chart review or clinical presentation and evaluation.     Musculoskeletal:   No diagnosis or significant findings on chart review or clinical presentation and evaluation.   JHFRAT score- 8 points. moderate risk for falls    Anesthesia:  ASA 2 - Patient with mild systemic disease with no functional limitations  Anticipated anesthesia-General  History of General anesthesia- yes  Complications- No anesthesia complications  No family history of anesthesia complications  Abnormalities noted on PAT evaluation: No    Labs & Imaging ordered:  CBC, CMP, HBA1C, EKG  Nickel/metal allergy-negative  Shellfish allergy-negative    Discussed with patient medication instructions, NPO  guidelines, and any questions or concerns.      time- 40 minutes

## 2024-12-02 NOTE — PREPROCEDURE INSTRUCTIONS
Medication List            Accurate as of December 2, 2024  8:00 AM. Always use your most recent med list.                aspirin 81 mg EC tablet  Additional Medication Adjustments for Surgery: Take last dose 7 days before surgery  Notes to patient: Last dose preoperatively on 12/4/24     baclofen 10 mg tablet  Commonly known as: Lioresal  Take 1 tablet (10 mg) by mouth 3 times a day.  Medication Adjustments for Surgery: Take/Use as prescribed     busPIRone 10 mg tablet  Commonly known as: Buspar  Take 2 tablets (20 mg) by mouth 2 times a day.  Medication Adjustments for Surgery: Take/Use as prescribed     cholecalciferol 25 MCG (1000 UT) capsule  Commonly known as: Vitamin D-3  Additional Medication Adjustments for Surgery: Take last dose 7 days before surgery  Notes to patient: Last dose preoperatively on 12/4/24     ezetimibe 10 mg tablet  Commonly known as: Zetia  Take 1 tablet (10 mg) by mouth once daily.  Medication Adjustments for Surgery: Take last dose 1 day (24 hours) before surgery  Notes to patient: Last dose preoperatively if taken in the evening then 12/11/24 if taken in the morning then last dose on 12/12/24     FLUoxetine 20 mg capsule  Commonly known as: PROzac  TAKE ONE CAPSULE BY MOUTH ONCE DAILY  Medication Adjustments for Surgery: Take/Use as prescribed     levothyroxine 75 mcg tablet  Commonly known as: Synthroid, Levoxyl  Take 1 tablet (75 mcg) by mouth once daily. Take 1 tablet daily and double on sundays  Medication Adjustments for Surgery: Take/Use as prescribed     omega-3 fatty acids-fish oil 340-1,000 mg capsule  Additional Medication Adjustments for Surgery: Take last dose 7 days before surgery  Notes to patient: Last dose preoperatively on 12/4/24     potassium citrate CR 10 mEq ER tablet  Commonly known as: Urocit-K-10  Take 1 tablet (10 mEq) by mouth 2 times a day with meals. Do not crush, chew, or split.  Medication Adjustments for Surgery: Do Not take on the morning of  surgery  Notes to patient: Hold the day of surgery      rosuvastatin 40 mg tablet  Commonly known as: Crestor  Take 1 tablet (40 mg) by mouth once daily.  Medication Adjustments for Surgery: Take/Use as prescribed            NPO Instructions:     Do not eat any food or drink liquid after midnight the night before your surgery/procedure.  Additional Instructions:      Seven/Six Days before Surgery:  Review your medication instructions, stop indicated medications  Five Days before Surgery:  Review your medication instructions, stop indicated medications  Three Days before Surgery:  Review your medication instructions, stop indicated medications  The Day before Surgery:  No smoking or alcohol use 24 hours before surgery  Review your medication instructions, stop indicated medications  You will be contacted regarding the time of your arrival to facility and surgery time  Do not eat any food after Midnight  Day of Surgery:  Review your medication instructions, take indicated medications  If you have diabetes, please check your fasting blood sugar upon awakening.  If fasting blood sugar is <80 mg/dl, drink 100 ml of apple juice, time limit of 2 hours before  Wear  comfortable loose fitting clothing  Do not use moisturizers, creams, lotions or perfume  All jewelry and valuables should be left at home     CONTACT SURGEON'S OFFICE IF YOU DEVELOP:  * Fever = 100.4 F   * New respiratory symptoms (e.g. cough, shortness of breath, respiratory distress, sore throat)  * Recent loss of taste or smell  *Flu like symptoms such as headache, fatigue or gastrointestinal symptoms  * You develop any open sores, shingles, burning or painful urination   AND/OR:  * You no longer wish to have the surgery.  * Any other personal circumstances change that may lead to the need to cancel or defer this surgery.  *You were admitted to any hospital within one week of your planned procedure.     SMOKING:  *Quitting smoking can make a huge difference  to your health and recovery from surgery.    *If you need help with quitting, call 9-555-QUIT-NOW.     THE DAY BEFORE SURGERY:  *Do not eat any food after midnight the night before your surgery.   SURGICAL TIME:  *You will be contacted between 2 p.m. and 3 p.m. the business day before your surgery with your arrival time.  *If you haven't received a call by 3pm, call [phone]  *Scheduled surgery times may change and you will be notified if this occurs-check your personal voicemail for any updates.     ON THE MORNING OF SURGERY:  *Wear comfortable, loose fitting clothing.   *Do not use moisturizers, creams, lotions or perfume.  *All jewelry and valuables should be left at home.  *Prosthetic devices such as contact lenses, hearing aids, dentures, eyelash extensions, hairpins and body piercing must be removed before surgery.     BRING WITH YOU:  *Photo ID and insurance card  *Current list of medications and allergies  *Pacemaker/Defibrillator/Heart stent cards  *CPAP machine and mask  *Slings/splints/crutches  *Copy of your complete Advanced Directive/DHPOA-if applicable  *Neurostimulator implant remote     PARKING AND ARRIVAL:  *Check in at the Main Entrance desk and let them know you are here for surgery.     IF YOU ARE HAVING OUTPATIENT/SAME DAY SURGERY:  *A responsible adult MUST accompany you at the time of discharge and stay with you for 24 hours after your surgery.  *You may NOT drive yourself home after surgery.  *You may use a taxi or ride sharing service ([employer] [facility] [employer] [facility], Uber) to return home ONLY if you are accompanied by a friend or family member.  *Instructions for resuming your medications will be provided by your surgeon.     Thank you for coming to Pre Admission testing.      If I have prescribed medication please don't forget to  at your pharmacy.      Any questions about today's visit call [phone] and leave a message in the general mailbox.     Patient instructed to ambulate as soon as possible  postoperatively to decrease thromboembolic risk.     HARDY Lozada-CNP     Thank you for visiting [facility].  If you have any changes to your health condition, please call the surgeons office to alert them and give them details of your symptoms.        Preoperative Fasting Guidelines     Why must I stop eating and drinking near surgery time?  With sedation, food or liquid in your stomach can enter your lungs causing serious complications  Increases nausea and vomiting     When do I need to stop eating and drinking before my surgery?  Do not eat any food after midnight the night before your surgery/procedure.        Additional Instructions:      The Day before Surgery:  -Review your medication instructions, stop indicated medications  -You will be contacted in the evening regarding the time of your arrival to facility and surgery time     Day of Surgery:  -Review your medication instructions, take indicated medications  -Wear comfortable loose fitting clothing  -Do not use moisturizers, creams, lotions or perfume  -All jewelry and valuables should be left at home                   Preoperative Brain Exercises     What are brain exercises?  A brain exercise is any activity that engages your thinking (cognitive) skills.     What types of activities are considered brain exercises?  Jigsaw puzzles, crossword puzzles, word jumble, memory games, word search, and many more.  Many can be found free online or on your phone via a mobile dejan.     Why should I do brain exercises before my surgery?  More recent research has shown brain exercise before surgery can lower the risk of postoperative delirium (confusion) which can be especially important for older adults.  Patients who did brain exercises for 5 to 10 hours the days before surgery, cut their risk of postoperative delirium in half up to 1 week after surgery.                         [facility]     Preoperative Deep  Breathing Exercises     Why it is important to do deep breathing exercises before my surgery?  Deep breathing exercises strengthen your breathing muscles.  This helps you to recover after your surgery and decreases the chance of breathing complications.        How are the deep breathing exercises done?  Sit straight with your back supported.  Breathe in deeply and slowly through your nose. Your lower rib cage should expand and your abdomen may move forward.  Hold that breath for 3 to 5 seconds.  Breathe out through pursed lips, slowly and completely.  Rest and repeat 10 times every hour while awake.  Rest longer if you become dizzy or lightheaded.                      [facility]     Preoperative Deep Breathing Exercises     Why it is important to do deep breathing exercises before my surgery?  Deep breathing exercises strengthen your breathing muscles.  This helps you to recover after your surgery and decreases the chance of breathing complications.        How are the deep breathing exercises done?  Sit straight with your back supported.  Breathe in deeply and slowly through your nose. Your lower rib cage should expand and your abdomen may move forward.  Hold that breath for 3 to 5 seconds.  Breathe out through pursed lips, slowly and completely.  Rest and repeat 10 times every hour while awake.  Rest longer if you become dizzy or lightheaded.        Patient Information: Incentive Spirometer  What is an incentive spirometer?  An incentive spirometer is a device used before and after surgery to “exercise” your lungs.  It helps you to take deeper breaths to expand your lungs.  Below is an example of a basic incentive spirometer.  The device you receive may differ slightly but they all function the same.    Why do I need to use an incentive spirometer?  Using your incentive spirometer prepares your lungs for surgery and helps prevent lung problems after surgery.  How do I use my incentive  spirometer?  When you're using your incentive spirometer, make sure to breathe through your mouth. If you breathe through your nose, the incentive spirometer won't work properly. You can hold your nose if you have trouble.  If you feel dizzy at any time, stop and rest. Try again at a later time.  Follow the steps below:  Set up your incentive spirometer, expand the flexible tubing and connect to the outlet.  Sit upright in a chair or bed. Hold the incentive spirometer at eye level.   Put the mouthpiece in your mouth and close your lips tightly around it. Slowly breathe out (exhale) completely.  Breathe in (inhale) slowly through your mouth as deeply as you can. As you take a breath, you will see the piston rise inside the large column. While the piston rises, the indicator should move upwards. It should stay in between the 2 arrows (see Figure).  Try to get the piston as high as you can, while keeping the indicator between the arrows.   If the indicator doesn't stay between the arrows, you're breathing either too fast or too slow.  When you get it as high as you can, hold your breath for 10 seconds, or as long as possible. While you're holding your breath, the piston will slowly fall to the base of the spirometer.  Once the piston reaches the bottom of the spirometer, breathe out slowly through your mouth. Rest for a few seconds.  Repeat 10 times. Try to get the piston to the same level with each breath.  Repeat every hour while awake  You can carefully clean the outside of the mouthpiece with an alcohol wipe or soap and water.       Patient and Family Education             Ways You Can Help Prevent Blood Clots                    This handout explains some simple things you can do to help prevent blood clots.      Blood clots are blockages that can form in the body's veins. When a blood clot forms in your deep veins, it may be called a deep vein thrombosis, or DVT for short. Blood clots can happen in any part of the  body where blood flows, but they are most common in the arms and legs. If a piece of a blood clot breaks free and travels to the lungs, it is called a pulmonary embolus (PE). A PE can be a very serious problem.         Being in the hospital or having surgery can raise your chances of getting a blood clot because you may not be well enough to move around as much as you normally do.         Ways you can help prevent blood clots in the hospital           Wearing SCDs. SCDs stands for Sequential Compression Devices.   SCDs are special sleeves that wrap around your legs  They attach to a pump that fills them with air to gently squeeze your legs every few minutes.   This helps return the blood in your legs to your heart.   SCDs should only be taken off when walking or bathing.   SCDs may not be comfortable, but they can help save your life.                                            Wearing compression stockings - if your doctor orders them. These special snug fitting stockings gently squeeze your legs to help blood flow.       Walking. Walking helps move the blood in your legs.   If your doctor says it is ok, try walking the halls at least   5 times a day. Ask us to help you get up, so you don't fall.      Taking any blood thinning medicines your doctor orders.        Page 1 of 2            [facility]; 3/23   Ways you can help prevent blood clots at home         Wearing compression stockings - if your doctor orders them. ? Walking - to help move the blood in your legs.       Taking any blood thinning medicines your doctor orders.      Signs of a blood clot or PE        Tell your doctor or nurse know right away if you have of the problems listed below.    If you are at home, seek medical care right away. Call 911 for chest pain or problems breathing.          Signs of a blood clot (DVT) - such as pain,  swelling, redness or warmth in your arm or leg      Signs of a pulmonary embolism (PE) - such as chest pain  or feeling short of breath

## 2024-12-02 NOTE — CPM/PAT H&P
CPM/PAT Evaluation       Name: Susan Rowan (Susan Rowan)  /Age: 1955/ y.o.     In-Person       Chief Complaint: Left renal stone     HPI  Patient is an alert and oriented 69 year old female scheduled for a  ESWL on 24 with Dr. Velasquez for  Left renal stone . PMHX includes OSWALDO, HLD, hypothyroidism, DM2. Presents to [facility] PAT today for perioperative risk stratification and optimization.       Past Medical History:   Diagnosis Date    Encounter for full-term uncomplicated delivery      (spontaneous vaginal delivery)    Encounter for screening for malignant neoplasm of colon 2017    Colon cancer screening    Other conditions influencing health status 2018    History of cough    Other specified postprocedural states     History of surgery on arm    Overactive bladder     Pain in unspecified foot 2017    Acute foot pain    Personal history of other diseases of the circulatory system 2020    History of abnormal electrocardiography    Personal history of other diseases of the musculoskeletal system and connective tissue     History of arthritis    Personal history of other diseases of the respiratory system 2018    History of sinusitis    Personal history of other diseases of the respiratory system 2018    History of sore throat    Personal history of other specified conditions 2019    History of urinary urgency    Personal history of urinary calculi     History of renal calculi    Pure hypercholesterolemia, unspecified     High cholesterol    Sleep apnea     Varicella without complication     Varicella without complication       Past Surgical History:   Procedure Laterality Date    OTHER SURGICAL HISTORY  2020    Elbow surgery    OTHER SURGICAL HISTORY  2017    Repair Of Humerus / Arm       Patient  reports being sexually active.    Family History   Problem Relation Name Age of Onset    Arthritis Mother      Other (CARDIAC DISORDER) Mother      Other  (CVA) Mother      Hyperlipidemia Mother      Osteoporosis Mother      Other (HOCM) Mother      Arthritis Father      Other (CARDIAC DISORDER) Father      Other (CEREBROVASCULAR ACCIDENT) Father      Hyperlipidemia Father      Other (MI) Father      Stroke Father      Hyperlipidemia Sister      Hyperlipidemia Brother      Anxiety disorder Other MAT GM     Depression Other MAT GM     Breast cancer Other MAT AUNT     Other (UTERUS CANCER) Other MAT AUNT        Allergies   Allergen Reactions    Vancomycin Rash    Cephalexin Unknown and Hives    Penicillins Hives and Unknown    Ozempic [Semaglutide] Other     Myalgia, severe fatigue, vomiting    Tolterodine Unknown, GI Upset, Other and Nausea/vomiting       Prior to Admission medications    Medication Sig Start Date End Date Taking? Authorizing Provider   aspirin 81 mg EC tablet Take 1 tablet (81 mg) by mouth once daily. 3/18/15   Historical Provider, MD   baclofen (Lioresal) 10 mg tablet Take 1 tablet (10 mg) by mouth 3 times a day. 7/17/24   Boyd Do MD   busPIRone (Buspar) 10 mg tablet Take 2 tablets (20 mg) by mouth 2 times a day. 5/21/24 5/21/25  JOSE Alva   cholecalciferol (Vitamin D-3) 25 MCG (1000 UT) capsule Take 2 capsules (50 mcg) by mouth once daily.    Historical Provider, MD   ezetimibe (Zetia) 10 mg tablet Take 1 tablet (10 mg) by mouth once daily. 5/21/24 5/21/25  Mp Andersen MD   FLUoxetine (PROzac) 20 mg capsule TAKE ONE CAPSULE BY MOUTH ONCE DAILY 11/27/24   JOSE Alva   levothyroxine (Synthroid, Levoxyl) 75 mcg tablet Take 1 tablet (75 mcg) by mouth once daily. Take 1 tablet daily and double on sundays 4/25/24 4/25/25  JOES Alva   mupirocin (Bactroban) 2 % ointment Apply topically once daily at bedtime.  Patient not taking: Reported on 11/12/2024 8/29/24 11/27/24  Boyd Do MD   omega-3 fatty acids-fish oil 340-1,000 mg capsule Take 1 capsule by mouth once daily in the evening.  4/13/15   Historical Provider, MD   potassium citrate CR (Urocit-K-10) 10 mEq ER tablet Take 1 tablet (10 mEq) by mouth 2 times a day with meals. Do not crush, chew, or split. 1/24/24 1/23/25  Ann Velasquez MD   rosuvastatin (Crestor) 40 mg tablet Take 1 tablet (40 mg) by mouth once daily.  Patient taking differently: Take 50 mg by mouth once daily. 11/7/24 11/7/25  Mp Andersen MD   FLUoxetine (PROzac) 20 mg capsule TAKE ONE CAPSULE BY MOUTH ONCE DAILY 8/19/24 11/27/24  Divya Aaron APRN-CNP      Visit Vitals  /71   Pulse 70   Temp 36.3 °C (97.3 °F) (Temporal)   Resp 14   Ht 1.524 m (5')   Wt 75 kg (165 lb 5.5 oz)   LMP  (LMP Unknown)   SpO2 97%   BMI 32.29 kg/m²   OB Status Postmenopausal   Smoking Status Never   BSA 1.78 m²         Review of Systems   Constitutional: Negative for chills, decreased appetite, diaphoresis, fever and malaise/fatigue.   Eyes:  Negative for blurred vision and double vision.   Cardiovascular:  Negative for chest pain, claudication, cyanosis, dyspnea on exertion, irregular heartbeat, leg swelling, near-syncope and palpitations.   Respiratory:  Negative for cough, hemoptysis, shortness of breath and wheezing.    Endocrine: Negative for cold intolerance, heat intolerance, polydipsia, polyphagia and polyuria.   Gastrointestinal:  Negative for abdominal pain, constipation, diarrhea, dysphagia, nausea and vomiting.   Genitourinary:  Negative for bladder incontinence, dysuria, hematuria, incomplete emptying, + nocturia, frequency, pelvic pain and urgency.   Neurological:  Negative for headaches, light-headedness, paresthesias, sensory change and weakness.   Psychiatric/Behavioral:  Negative for altered mental status.    Musculoskeletal: Negative for myalgias, arthralgias     Vitals and nursing note reviewed.     Physical exam  Constitutional:       Appearance: Normal appearance. She is Obese.   HENT:      Head: Normocephalic and atraumatic.      Mouth/Throat:      Mouth: Mucous  membranes are moist.      Pharynx: Oropharynx is clear.   Eyes:      Extraocular Movements: Extraocular movements intact.      Conjunctiva/sclera: Conjunctivae normal.      Pupils: Pupils are equal, round, and reactive to light.   Cardiovascular:      PMI at left midclavicular line. Normal rate. Regular rhythm. Normal S1. Normal S2.       Murmurs: There is no murmur.      No gallop.  No click. No rub.       No audible carotid bruit     No lower extremity edema on exam  Pulmonary:      Effort: Pulmonary effort is normal.      Breath sounds: Normal breath sounds.   Abdominal:      General: Abdomen is flat. Bowel sounds are normal.      Palpations: Abdomen is soft and non-tender  Musculoskeletal:      Cervical back: Normal range of motion and neck supple.   Skin:     General: Skin is warm and dry.      Capillary Refill: Capillary refill takes less than 2 seconds.   Neurological:      General: No focal deficit present.      Mental Status: She is alert and oriented to person, place, and time. Mental status is at baseline.   Psychiatric:         Mood and Affect: Mood normal.         Behavior: Behavior normal.         Thought Content: Thought content normal.         Judgment: Judgment normal.     Vitals and nursing note reviewed. Physical exam within normal limits.   DASI Risk Score      Flowsheet Row Pre-Admission Testing [facility] 12/2/2024 in [facility]   Can you take care of yourself (eat, dress, bathe, or use toilet)?  2.75 filed at 12/02/2024 0822   Can you walk indoors, such as around your house? 1.75 filed at 12/02/2024 0822   Can you walk a block or two on level ground?  2.75 filed at 12/02/2024 0822   Can you climb a flight of stairs or walk up a hill? 5.5 filed at 12/02/2024 0822   Can you run a short distance? 8 filed at 12/02/2024 0822   Can you do light work around the house like dusting or washing dishes? 2.7 filed at 12/02/2024 0822   Can you do moderate work around the house like vacuuming,  sweeping floors or carrying groceries? 3.5 filed at 12/02/2024 0822   Can you do heavy work around the house like scrubbing floors or lifting and moving heavy furniture?  8 filed at 12/02/2024 0822   Can you do yard work like raking leaves, weeding or pushing a mower? 4.5 filed at 12/02/2024 0822   Can you have sexual relations? 5.25 filed at 12/02/2024 0822   Can you participate in moderate recreational activities like golf, bowling, dancing, doubles tennis or throwing a baseball or football? 6 filed at 12/02/2024 0822   Can you participate in strenous sports like swimming, singles tennis, football, basketball, or skiing? 0 filed at 12/02/2024 0822   DASI SCORE 50.7 filed at 12/02/2024 0822   METS Score (Will be calculated only when all the questions are answered) 9 filed at 12/02/2024 0822          Caprini DVT Assessment      Flowsheet Row Pre-Admission Testing from 12/2/2024 in [facility]   DVT Score 7 filed at 12/02/2024 0820   Surgical Factors Major surgery planned, including arthroscopic and laproscopic (1-2 hours) filed at 12/02/2024 0820   BMI 31-40 (Obesity) filed at 12/02/2024 0820          Modified Frailty Index    No data to display       CHADS2 Stroke Risk  Current as of 7 minutes ago        N/A 3 to 100%: High Risk   2 to < 3%: Medium Risk   0 to < 2%: Low Risk     Last Change: N/A          This score determines the patient's risk of having a stroke if the patient has atrial fibrillation.        This score is not applicable to this patient. Components are not calculated.          Revised Cardiac Risk Index      Flowsheet Row Pre-Admission Testing from 12/2/2024 in [facility]   High-Risk Surgery (Intraperitoneal, Intrathoracic,Suprainguinal vascular) 0 filed at 12/02/2024 0822   History of ischemic heart disease (History of MI, History of positive exercuse test, Current chest paint considered due to myocardial ischemia, Use of nitrate therapy, ECG with pathological Q  Waves) 0 filed at 12/02/2024 0822   History of congestive heart failure (pulmonary edemia, bilateral rales or S3 gallop, Paroxysmal nocturnal dyspnea, CXR showing pulmonary vascular redistribution) 0 filed at 12/02/2024 0822   History of cerebrovascular disease (Prior TIA or stroke) 0 filed at 12/02/2024 0822   Pre-operative insulin treatment 0 filed at 12/02/2024 0822   Pre-operative creatinine>2 mg/dl 0 filed at 12/02/2024 0822   Revised Cardiac Risk Calculator 0 filed at 12/02/2024 0822          Apfel Simplified Score    No data to display       Risk Analysis Index Results This Encounter    No data found in the last 10 encounters.       Stop Bang Score      Flowsheet Row Pre-Admission Testing from 12/2/2024 in [facility]   Do you snore loudly? 1  [not when using CPAP] filed at 12/02/2024 0748   Do you often feel tired or fatigued after your sleep? 0 filed at 12/02/2024 0748   Has anyone ever observed you stop breathing in your sleep? 1 filed at 12/02/2024 0748   Do you have or are you being treated for high blood pressure? 0 filed at 12/02/2024 0748   Recent BMI (Calculated) 32.3 filed at 12/02/2024 0748   Is BMI greater than 35 kg/m2? 0=No filed at 12/02/2024 0748   Age older than 50 years old? 1=Yes filed at 12/02/2024 0748   Is your neck circumference greater than 17 inches (Male) or 16 inches (Female)? 0 filed at 12/02/2024 0748   Gender - Male 0=No filed at 12/02/2024 0748   STOP-BANG Total Score 3 filed at 12/02/2024 0748          Prodigy: High Risk  Total Score: 8              Prodigy Age Score           ARISCAT Score for Postoperative Pulmonary Complications      Flowsheet Row Pre-Admission Testing from 12/2/2024 in [facility]   Age, years  3 filed at 12/02/2024 0823   Preoperative SpO2 0 filed at 12/02/2024 0823   Respiratory infection in the last month Either upper or lower (i.e., URI, bronchitis, pneumonia), with fever and antibiotic treatment 0 filed at 12/02/2024  0823   Preoperative anemoa (Hgb less than 10 g/dl) 0 filed at 12/02/2024 0823   Surgical incision  0 filed at 12/02/2024 0823   Duration of surgery  0 filed at 12/02/2024 0823   Emergency Procedure  0 filed at 12/02/2024 0823   ARISCAT Total Score  3 filed at 12/02/2024 0823          Yoni Perioperative Risk for Myocardial Infarction or Cardiac Arrest (ALEXIS)      Flowsheet Row Pre-Admission Testing from 12/2/2024 in [facility]   Age 1.36 filed at 12/02/2024 0823   Functional Status  0 filed at 12/02/2024 0823   ASA Class  -3.29 filed at 12/02/2024 0823   Creatinine 0 filed at 12/02/2024 0823   Type of Procedure  -0.26 filed at 12/02/2024 0823   ALEXIS Total Score  -7.44 filed at 12/02/2024 0823   ALEXIS % 0.06 filed at 12/02/2024 0823          Assessment & Plan:    Neuro:  No diagnosis or significant findings on chart review or clinical presentation and evaluation.     HEENT/Airway:  OSWALDO on CPAP   STOP-BANG Score- pt has OSWALDO and uses CPAP     Mallampati::  II    TM distance::  >3 FB    Neck ROM::  Full  Dentures-denies  Crowns-reports  Implants-denies    Cardiovascular:  Septal hypertrophy with recent workup from cardiology. HTN, HLD   METS: 9  RCRI: 0 points, 3.9%  risk for postoperative MACE   ALEXIS: 0.06% risk for postoperative MACE  EKG -unchanged from previous tracings Rate- 68 No acute changes  Echo 3/18/24    1. Left ventricular systolic function is normal with a 65% estimated ejection fraction.   2. Spectral Doppler shows an impaired relaxation pattern of left ventricular diastolic filling.   3. Asymmetric hypertrophy of the interventricular septum (maximal anteroseptal thickness 1.4 cm).   4. No systolic anterior motion of the mitral valve/left ventricular outflow tract obstruction seen at rest or with Valsalva.   5. No significant changes compared to prior echocardiogram dated 9/9/2022.      Pulmonary:  No diagnosis or significant findings on chart review or clinical presentation and  evaluation.   ARISCAT: <26 points, 1.6% risk of in-hospital postoperative pulmonary complication  PRODIGY: Low risk for opioid induced respiratory depression  Smoking History-She has never smoked.  Discussed smoking cessation and deep breathing handout given    Renal/Urinary:    Renal stone however, the patient is at increased risk of perioperative renal complications secondary to age>/= 57. Preventative measures include BP monitoring, medication compliance, and hydration management.   CMP-reviewed  Creatinine-0.83  GFR-77    Endocrine:  DM2 diet controlled. Hypothyroidism (levothyroxine)    Hematologic/Immunology:  No diagnosis or significant findings on chart review or clinical presentation and evaluation.  The patient is not a Jehovah’s witness and will accept blood and blood products if medically indicated.   History of previous blood transfusions No  CBC-reviewed  H/H-13.9/42.6  Caprini Score 7, patient at Moderate for postoperative DVT. Pt supplied education/VTE handout  Anticoagulation use: Yes     Gastrointestinal:   No diagnosis or significant findings on chart review or clinical presentation and evaluation.   Recreational drug use: Drug use No  Alcohol use social drinker    Infectious disease:   No diagnosis or significant findings on chart review or clinical presentation and evaluation.     Musculoskeletal:   No diagnosis or significant findings on chart review or clinical presentation and evaluation.   JHFRAT score- 8 points. moderate risk for falls    Anesthesia:  ASA 2 - Patient with mild systemic disease with no functional limitations  Anticipated anesthesia-General  History of General anesthesia- yes  Complications- No anesthesia complications  No family history of anesthesia complications  Abnormalities noted on PAT evaluation: No    Labs & Imaging ordered:  CBC, CMP, HBA1C, EKG  Nickel/metal allergy-negative  Shellfish allergy-negative    Discussed with patient medication instructions, NPO  guidelines, and any questions or concerns.      time- 40 minutes

## 2024-12-03 ENCOUNTER — TELEPHONE (OUTPATIENT)
Dept: CARDIOLOGY | Facility: CLINIC | Age: 69
End: 2024-12-03
Payer: COMMERCIAL

## 2024-12-03 ENCOUNTER — PATIENT MESSAGE (OUTPATIENT)
Dept: CARDIOLOGY | Facility: CLINIC | Age: 69
End: 2024-12-03
Payer: COMMERCIAL

## 2024-12-03 DIAGNOSIS — E11.9 TYPE 2 DIABETES MELLITUS WITHOUT COMPLICATION, WITHOUT LONG-TERM CURRENT USE OF INSULIN (MULTI): ICD-10-CM

## 2024-12-03 DIAGNOSIS — E78.00 PURE HYPERCHOLESTEROLEMIA: Primary | ICD-10-CM

## 2024-12-03 LAB — BACTERIA UR CULT: NORMAL

## 2024-12-03 NOTE — TELEPHONE ENCOUNTER
Spoke with patient, reviewed notes.  ECG looks like abnormal lead placement with decreased anterior forces across the precordium, as compared to previous tracings.  Patient is asymptomatic.  She can proceed with surgery.  However, we will repeat an ECG when she is seen in March, 2025.  Ordered a hemoglobin A1c and fasting lipid panel prior to the office visit, since we recently increased rosuvastatin from 20 mg to 40 mg daily.

## 2024-12-13 ENCOUNTER — ANESTHESIA EVENT (OUTPATIENT)
Dept: OPERATING ROOM | Facility: HOSPITAL | Age: 69
End: 2024-12-13
Payer: COMMERCIAL

## 2024-12-13 ENCOUNTER — HOSPITAL ENCOUNTER (OUTPATIENT)
Facility: HOSPITAL | Age: 69
Setting detail: OUTPATIENT SURGERY
Discharge: HOME | End: 2024-12-13
Attending: UROLOGY | Admitting: UROLOGY
Payer: COMMERCIAL

## 2024-12-13 ENCOUNTER — ANESTHESIA (OUTPATIENT)
Dept: OPERATING ROOM | Facility: HOSPITAL | Age: 69
End: 2024-12-13
Payer: COMMERCIAL

## 2024-12-13 VITALS
SYSTOLIC BLOOD PRESSURE: 117 MMHG | HEART RATE: 94 BPM | RESPIRATION RATE: 16 BRPM | DIASTOLIC BLOOD PRESSURE: 82 MMHG | BODY MASS INDEX: 32.33 KG/M2 | HEIGHT: 60 IN | TEMPERATURE: 98.1 F | OXYGEN SATURATION: 94 % | WEIGHT: 164.68 LBS

## 2024-12-13 DIAGNOSIS — N20.0 LEFT RENAL STONE: Primary | ICD-10-CM

## 2024-12-13 LAB
GLUCOSE BLD MANUAL STRIP-MCNC: 106 MG/DL (ref 74–99)
GLUCOSE BLD MANUAL STRIP-MCNC: 148 MG/DL (ref 74–99)

## 2024-12-13 PROCEDURE — 3600000008 HC OR TIME - EACH INCREMENTAL 1 MINUTE - PROCEDURE LEVEL THREE: Performed by: UROLOGY

## 2024-12-13 PROCEDURE — 82947 ASSAY GLUCOSE BLOOD QUANT: CPT

## 2024-12-13 PROCEDURE — 2500000004 HC RX 250 GENERAL PHARMACY W/ HCPCS (ALT 636 FOR OP/ED): Performed by: UROLOGY

## 2024-12-13 PROCEDURE — 2500000004 HC RX 250 GENERAL PHARMACY W/ HCPCS (ALT 636 FOR OP/ED): Performed by: INTERNAL MEDICINE

## 2024-12-13 PROCEDURE — 7100000001 HC RECOVERY ROOM TIME - INITIAL BASE CHARGE: Performed by: UROLOGY

## 2024-12-13 PROCEDURE — 2500000001 HC RX 250 WO HCPCS SELF ADMINISTERED DRUGS (ALT 637 FOR MEDICARE OP): Performed by: INTERNAL MEDICINE

## 2024-12-13 PROCEDURE — 7100000010 HC PHASE TWO TIME - EACH INCREMENTAL 1 MINUTE: Performed by: UROLOGY

## 2024-12-13 PROCEDURE — 2500000002 HC RX 250 W HCPCS SELF ADMINISTERED DRUGS (ALT 637 FOR MEDICARE OP, ALT 636 FOR OP/ED): Performed by: STUDENT IN AN ORGANIZED HEALTH CARE EDUCATION/TRAINING PROGRAM

## 2024-12-13 PROCEDURE — 3600000003 HC OR TIME - INITIAL BASE CHARGE - PROCEDURE LEVEL THREE: Performed by: UROLOGY

## 2024-12-13 PROCEDURE — 7100000009 HC PHASE TWO TIME - INITIAL BASE CHARGE: Performed by: UROLOGY

## 2024-12-13 PROCEDURE — 3700000001 HC GENERAL ANESTHESIA TIME - INITIAL BASE CHARGE: Performed by: UROLOGY

## 2024-12-13 PROCEDURE — 3700000002 HC GENERAL ANESTHESIA TIME - EACH INCREMENTAL 1 MINUTE: Performed by: UROLOGY

## 2024-12-13 PROCEDURE — 7100000002 HC RECOVERY ROOM TIME - EACH INCREMENTAL 1 MINUTE: Performed by: UROLOGY

## 2024-12-13 PROCEDURE — 50590 FRAGMENTING OF KIDNEY STONE: CPT | Performed by: UROLOGY

## 2024-12-13 RX ORDER — SUCCINYLCHOLINE CHLORIDE 20 MG/ML INJECTION SOLUTION
SOLUTION AS NEEDED
Status: DISCONTINUED | OUTPATIENT
Start: 2024-12-13 | End: 2024-12-13

## 2024-12-13 RX ORDER — ALBUTEROL SULFATE 0.83 MG/ML
2.5 SOLUTION RESPIRATORY (INHALATION) ONCE AS NEEDED
Status: COMPLETED | OUTPATIENT
Start: 2024-12-13 | End: 2024-12-13

## 2024-12-13 RX ORDER — SOLIFENACIN SUCCINATE 10 MG/1
10 TABLET, FILM COATED ORAL DAILY
COMMUNITY

## 2024-12-13 RX ORDER — LABETALOL HYDROCHLORIDE 5 MG/ML
5 INJECTION, SOLUTION INTRAVENOUS ONCE AS NEEDED
Status: DISCONTINUED | OUTPATIENT
Start: 2024-12-13 | End: 2024-12-13 | Stop reason: HOSPADM

## 2024-12-13 RX ORDER — LIDOCAINE HYDROCHLORIDE 10 MG/ML
0.1 INJECTION, SOLUTION EPIDURAL; INFILTRATION; INTRACAUDAL; PERINEURAL ONCE
Status: DISCONTINUED | OUTPATIENT
Start: 2024-12-13 | End: 2024-12-13 | Stop reason: HOSPADM

## 2024-12-13 RX ORDER — ONDANSETRON HYDROCHLORIDE 2 MG/ML
4 INJECTION, SOLUTION INTRAVENOUS ONCE AS NEEDED
Status: DISCONTINUED | OUTPATIENT
Start: 2024-12-13 | End: 2024-12-13 | Stop reason: HOSPADM

## 2024-12-13 RX ORDER — ONDANSETRON HYDROCHLORIDE 2 MG/ML
INJECTION, SOLUTION INTRAVENOUS AS NEEDED
Status: DISCONTINUED | OUTPATIENT
Start: 2024-12-13 | End: 2024-12-13

## 2024-12-13 RX ORDER — LIDOCAINE HYDROCHLORIDE 20 MG/ML
INJECTION, SOLUTION INFILTRATION; PERINEURAL AS NEEDED
Status: DISCONTINUED | OUTPATIENT
Start: 2024-12-13 | End: 2024-12-13

## 2024-12-13 RX ORDER — DROPERIDOL 2.5 MG/ML
0.62 INJECTION, SOLUTION INTRAMUSCULAR; INTRAVENOUS ONCE AS NEEDED
Status: DISCONTINUED | OUTPATIENT
Start: 2024-12-13 | End: 2024-12-13 | Stop reason: HOSPADM

## 2024-12-13 RX ORDER — CIPROFLOXACIN 2 MG/ML
400 INJECTION, SOLUTION INTRAVENOUS ONCE
Status: COMPLETED | OUTPATIENT
Start: 2024-12-13 | End: 2024-12-13

## 2024-12-13 RX ORDER — ALBUTEROL SULFATE 90 UG/1
INHALANT RESPIRATORY (INHALATION) AS NEEDED
Status: DISCONTINUED | OUTPATIENT
Start: 2024-12-13 | End: 2024-12-13

## 2024-12-13 RX ORDER — PROPOFOL 10 MG/ML
INJECTION, EMULSION INTRAVENOUS AS NEEDED
Status: DISCONTINUED | OUTPATIENT
Start: 2024-12-13 | End: 2024-12-13

## 2024-12-13 RX ORDER — FENTANYL CITRATE 50 UG/ML
INJECTION, SOLUTION INTRAMUSCULAR; INTRAVENOUS AS NEEDED
Status: DISCONTINUED | OUTPATIENT
Start: 2024-12-13 | End: 2024-12-13

## 2024-12-13 RX ORDER — FENTANYL CITRATE 50 UG/ML
25 INJECTION, SOLUTION INTRAMUSCULAR; INTRAVENOUS EVERY 5 MIN PRN
Status: DISCONTINUED | OUTPATIENT
Start: 2024-12-13 | End: 2024-12-13 | Stop reason: HOSPADM

## 2024-12-13 RX ORDER — SODIUM CHLORIDE, SODIUM LACTATE, POTASSIUM CHLORIDE, CALCIUM CHLORIDE 600; 310; 30; 20 MG/100ML; MG/100ML; MG/100ML; MG/100ML
50 INJECTION, SOLUTION INTRAVENOUS CONTINUOUS
Status: DISCONTINUED | OUTPATIENT
Start: 2024-12-13 | End: 2024-12-13 | Stop reason: HOSPADM

## 2024-12-13 RX ORDER — FENTANYL CITRATE 50 UG/ML
12.5 INJECTION, SOLUTION INTRAMUSCULAR; INTRAVENOUS EVERY 5 MIN PRN
Status: DISCONTINUED | OUTPATIENT
Start: 2024-12-13 | End: 2024-12-13 | Stop reason: HOSPADM

## 2024-12-13 RX ORDER — PHENYLEPHRINE HCL IN 0.9% NACL 0.4MG/10ML
SYRINGE (ML) INTRAVENOUS AS NEEDED
Status: DISCONTINUED | OUTPATIENT
Start: 2024-12-13 | End: 2024-12-13

## 2024-12-13 RX ORDER — FENTANYL CITRATE 50 UG/ML
50 INJECTION, SOLUTION INTRAMUSCULAR; INTRAVENOUS EVERY 5 MIN PRN
Status: DISCONTINUED | OUTPATIENT
Start: 2024-12-13 | End: 2024-12-13 | Stop reason: HOSPADM

## 2024-12-13 RX ORDER — OXYCODONE AND ACETAMINOPHEN 5; 325 MG/1; MG/1
1 TABLET ORAL EVERY 4 HOURS PRN
Status: DISCONTINUED | OUTPATIENT
Start: 2024-12-13 | End: 2024-12-13 | Stop reason: HOSPADM

## 2024-12-13 SDOH — HEALTH STABILITY: MENTAL HEALTH: CURRENT SMOKER: 0

## 2024-12-13 ASSESSMENT — COLUMBIA-SUICIDE SEVERITY RATING SCALE - C-SSRS
6. HAVE YOU EVER DONE ANYTHING, STARTED TO DO ANYTHING, OR PREPARED TO DO ANYTHING TO END YOUR LIFE?: NO
1. IN THE PAST MONTH, HAVE YOU WISHED YOU WERE DEAD OR WISHED YOU COULD GO TO SLEEP AND NOT WAKE UP?: NO
2. HAVE YOU ACTUALLY HAD ANY THOUGHTS OF KILLING YOURSELF?: NO

## 2024-12-13 ASSESSMENT — PAIN - FUNCTIONAL ASSESSMENT
PAIN_FUNCTIONAL_ASSESSMENT: 0-10

## 2024-12-13 ASSESSMENT — PAIN SCALES - GENERAL
PAINLEVEL_OUTOF10: 0 - NO PAIN

## 2024-12-13 NOTE — ANESTHESIA PROCEDURE NOTES
Airway  Date/Time: 12/13/2024 1:18 PM  Urgency: elective    Airway not difficult    Staffing  Performed: CRNA   Authorized by: El Abdul MD    Performed by: HARDY Vickers-CNP, APRN-CRNA  Patient location during procedure: OR    Indications and Patient Condition  Indications for airway management: anesthesia  Spontaneous ventilation: present  Sedation level: deep  Preoxygenated: yes  Patient position: sniffing  MILS maintained throughout  Mask difficulty assessment: 1 - vent by mask  No planned trial extubation    Final Airway Details  Final airway type: endotracheal airway      Successful airway: ETT  Cuffed: yes   Successful intubation technique: direct laryngoscopy  Facilitating devices/methods: intubating stylet  Blade: Miriam  Blade size: #4  ETT size (mm): 7.5  Cormack-Lehane Classification: grade IIa - partial view of glottis  Placement verified by: capnometry   Measured from: teeth  ETT to teeth (cm): 23  Number of attempts at approach: 1    Additional Comments  Converted from LMA due to spasm

## 2024-12-13 NOTE — OP NOTE
ESWL ([facility]) (L) Operative Note     Date: 2024  OR Location: [facility]    Name: Susan Rowan, : 1955, Age: 69 y.o., MRN: [id number], Sex: female    Diagnosis  Pre-op Diagnosis      * Left renal stone [N20.0] Post-op Diagnosis     * Left renal stone [N20.0]     Procedures  ESWL ([facility])  76972 - CA LITHOTRIPSY XTRCORP SHOCK WAVE      Surgeons      * Ann Velasquez - Primary    Resident/Fellow/Other Assistant:  Surgeons and Role:  * No surgeons found with a matching role *    Staff:   Marloulator: Imelda Sellersulator: Rose Marie    Anesthesia Staff: Anesthesiologist: El Abdul MD  CRNA: Benigno Clancy, HARDY-CNP, APRN-CRNA    Procedure Summary  Anesthesia: General  ASA: II  Estimated Blood Loss: 0mL  Intra-op Medications:   Administrations occurring from 1220 to 1335 on 24:   Medication Name Total Dose   dexAMETHasone (Decadron) injection 4 mg/mL 8 mg   fentaNYL (Sublimaze) injection 50 mcg/mL 100 mcg   LR bolus Cannot be calculated   lidocaine (Xylocaine) injection 2 % 50 mg   ondansetron (Zofran) 2 mg/mL injection 4 mg   propofol (Diprivan) injection 10 mg/mL 200 mg   succinylcholine chloride injection syringe 200 mg/10 ml 100 mg   ciprofloxacin (Cipro) 400 mg in dextrose 5%  mL 400 mg              Anesthesia Record               Intraprocedure I/O Totals       None           Specimen: No specimens collected              Drains and/or Catheters: * None in log *    Tourniquet Times:         Implants:     Findings: left renal stone    Indications: Susan Rowan is an 69 y.o. female who is having surgery for Left renal stone [N20.0].     The patient was seen in the preoperative area. The risks, benefits, complications, treatment options, non-operative alternatives, expected recovery and outcomes were discussed with the patient. The possibilities of reaction to medication, pulmonary aspiration, injury to surrounding structures, bleeding, recurrent infection, the need for additional procedures,  failure to diagnose a condition, and creating a complication requiring transfusion or operation were discussed with the patient. The patient concurred with the proposed plan, giving informed consent.  The site of surgery was properly noted/marked if necessary per policy. The patient has been actively warmed in preoperative area. Preoperative antibiotics have been ordered and given within 1 hours of incision. Venous thrombosis prophylaxis have been ordered including bilateral sequential compression devices    Procedure Details: Patient was met in the preoperative area and consented to the procedure. The patient was taken back to the operating room and transferred to the operating table in supine position.  Allergies were reviewed. Preoperative antibiotics were administered. A timeout was performed and all were in agreement. Patient underwent general anesthesia without complication.   We then used fluoroscopy to identify an approximately  5mm calcific density overlying the location of the lower pole of the  left kidney.  At this point, the patient was then induced under anesthesia.  Appropriate antibiotics were administered at this time.  We then repositioned the patient in the correct positioning over the [place] Lithotripter.  Again, the stone was already previously identified and this was   localized and targeted for therapy.  We began the procedure by initiating lithotripsy at a rate of 60 shocks per minute.  We did perform a 3-minute pause after approximately the first 500 shocks.  The power was progressively increased and ramped from 1 to 5 and we did increase the rate up to 90 shocks per minute.  A total of 2000 shocks were delivered at the calcific density until the stone was completely radiolucent.  This concluded the procedure and the patient was awoken from general anesthesia and transferred to the PACU in stable condition.    Complications:  None; patient tolerated the procedure well.    Disposition: PACU  - hemodynamically stable.  Condition: stable                 Additional Details:     Attending Attestation: I was present and scrubbed for the entire procedure.    Ann Velasquez  Phone Number: [phone]

## 2024-12-13 NOTE — ANESTHESIA PREPROCEDURE EVALUATION
Patient: Susan Rowan    Procedure Information       Date/Time: 12/13/24 1220    Procedure: ESWL ([facility]) (Left)    Location: [facility] 02 / Virtual [facility]    Surgeons: Ann Velasquez MD            Relevant Problems   Cardiac  Hx of septal hypertrophy without DIONTE evidence of HERB or LVOT obstruction   (+) Hyperlipidemia   (+) Murmur      Pulmonary   (+) OSWALDO (obstructive sleep apnea)      Neuro   (+) Anxiety   (+) Cervical neuritis      /Renal   (+) Kidney stone   (+) Left renal stone   (+) Recurrent nephrolithiasis      Liver   (+) Liver lesion      Endocrine   (+) Class 1 obesity due to excess calories with body mass index (BMI) of 31.0 to 31.9 in adult   (+) Hypothyroid   (+) Type 2 diabetes mellitus without complication, without long-term current use of insulin (Multi)       Clinical information reviewed:   Tobacco  Allergies  Meds   Med Hx  Surg Hx  OB Status  Fam Hx  Soc   Hx        NPO Detail:  NPO/Void Status  Date of Last Liquid: 12/12/24  Time of Last Liquid: 2230  Date of Last Solid: 12/12/24  Time of Last Solid: 2100  Time of Last Void: 1120         Physical Exam    Airway  Mallampati: II  TM distance: >3 FB  Neck ROM: full     Cardiovascular - normal exam     Dental - normal exam     Pulmonary - normal exam     Abdominal - normal exam             Anesthesia Plan    History of general anesthesia?: yes  History of complications of general anesthesia?: no    ASA 2     general     The patient is not a current smoker.    intravenous induction   Postoperative administration of opioids is intended.  Trial extubation is planned.  Anesthetic plan and risks discussed with patient.  Use of blood products discussed with patient who consented to blood products.    Plan discussed with CRNA.

## 2024-12-13 NOTE — DISCHARGE INSTRUCTIONS
Your kidney stones have been treated with ESWL (Extracorporeal Shock Wave Lithotripsy). That means breaks the stones without placing any instruments inside the body. Once the stones are broken up they can travel down the ureter (the tube leading from the kidney to the bladder) and into the bladder more easily.    You can expect some frequency, urgency and burning with urination for a few days. You may take the medications prescribed. You may have some redness and bruising over the treatment area.  If the pain is not relieved with medicines, call the urology department.    Diet:  You may return to your normal diet immediately. Because of the raw urinary tract surfaces, alcohol, spicy foods, and drinks with caffeine may cause some irritation or frequency of urination and should be used in moderation. To keep your urine flowing freely and to avoid constipation, drink plenty of fluids during the day (8-10 glasses.) Water is best, but juices, coffee, tea, and soda are all acceptable.    Problems you should report to the Urology Dept (Fever greater than 100.5 degrees F  Heavy bleeding or clots  Inability to urinate  Reactions to medication (hives, rash, nausea, vomiting, diarrhea)

## 2024-12-13 NOTE — NURSING NOTE
1408: patient admitted to pacu. Patient needed to be advanced from a LMA to a ET tube in OR per anesthesia bedside report, d/t laryngospasm. Patient coughing upon wakening.  1420: albuterol treatment given

## 2024-12-13 NOTE — ANESTHESIA POSTPROCEDURE EVALUATION
Patient: Susan Rowan    Procedure Summary       Date: 12/13/24 Room / Location: [facility] / [facility]    Anesthesia Start: 1302 Anesthesia Stop: 1412    Procedure: ESWL ([place]) (Left) Diagnosis:       Left renal stone      (Left renal stone [N20.0])    Surgeons: Ann Velasquez MD Responsible Provider: El Abdul MD    Anesthesia Type: general ASA Status: 2            Anesthesia Type: general    Vitals Value Taken Time   /92 12/13/24 1418   Temp 35.9 °C (96.6 °F) 12/13/24 1408   Pulse 92 12/13/24 1418   Resp 14 12/13/24 1418   SpO2 95 % 12/13/24 1408       Anesthesia Post Evaluation    Patient location during evaluation: PACU  Patient participation: complete - patient participated  Level of consciousness: awake and alert  Pain management: adequate  Multimodal analgesia pain management approach  Airway patency: patent  Cardiovascular status: acceptable  Respiratory status: acceptable  Hydration status: acceptable  Postoperative Nausea and Vomiting: none        No notable events documented.

## 2024-12-19 ENCOUNTER — APPOINTMENT (OUTPATIENT)
Dept: ENDOCRINOLOGY | Facility: CLINIC | Age: 69
End: 2024-12-19
Payer: COMMERCIAL

## 2024-12-19 VITALS
HEIGHT: 60 IN | RESPIRATION RATE: 16 BRPM | DIASTOLIC BLOOD PRESSURE: 70 MMHG | WEIGHT: 166.2 LBS | HEART RATE: 62 BPM | BODY MASS INDEX: 32.63 KG/M2 | SYSTOLIC BLOOD PRESSURE: 110 MMHG

## 2024-12-19 DIAGNOSIS — E11.9 TYPE 2 DIABETES MELLITUS WITHOUT COMPLICATION, WITHOUT LONG-TERM CURRENT USE OF INSULIN (MULTI): Primary | ICD-10-CM

## 2024-12-19 LAB — POC HEMOGLOBIN A1C: 6.7 % (ref 4.2–6.5)

## 2024-12-19 PROCEDURE — 3008F BODY MASS INDEX DOCD: CPT | Performed by: INTERNAL MEDICINE

## 2024-12-19 PROCEDURE — 1160F RVW MEDS BY RX/DR IN RCRD: CPT | Performed by: INTERNAL MEDICINE

## 2024-12-19 PROCEDURE — 3078F DIAST BP <80 MM HG: CPT | Performed by: INTERNAL MEDICINE

## 2024-12-19 PROCEDURE — 3074F SYST BP LT 130 MM HG: CPT | Performed by: INTERNAL MEDICINE

## 2024-12-19 PROCEDURE — 3044F HG A1C LEVEL LT 7.0%: CPT | Performed by: INTERNAL MEDICINE

## 2024-12-19 PROCEDURE — 1036F TOBACCO NON-USER: CPT | Performed by: INTERNAL MEDICINE

## 2024-12-19 PROCEDURE — 83036 HEMOGLOBIN GLYCOSYLATED A1C: CPT | Performed by: INTERNAL MEDICINE

## 2024-12-19 PROCEDURE — 3048F LDL-C <100 MG/DL: CPT | Performed by: INTERNAL MEDICINE

## 2024-12-19 PROCEDURE — 99204 OFFICE O/P NEW MOD 45 MIN: CPT | Performed by: INTERNAL MEDICINE

## 2024-12-19 PROCEDURE — 1159F MED LIST DOCD IN RCRD: CPT | Performed by: INTERNAL MEDICINE

## 2024-12-19 PROCEDURE — 1157F ADVNC CARE PLAN IN RCRD: CPT | Performed by: INTERNAL MEDICINE

## 2024-12-19 ASSESSMENT — ENCOUNTER SYMPTOMS
NAUSEA: 0
VOMITING: 0
DIARRHEA: 0
FATIGUE: 0
SHORTNESS OF BREATH: 0
HEADACHES: 0
FEVER: 0
COUGH: 0
PALPITATIONS: 0
CHILLS: 0

## 2024-12-19 NOTE — PROGRESS NOTES
Endocrinology New Patient Consult  Subjective   Patient ID: Susan Rowan is a 69 y.o. female who presents for Diabetes. Patient is a self referral.     PCP: JOSE Alva    HPI  69-year-old self-referred for evaluation of type 2 diabetes.  Of note I see her  as well.  She was recently diagnosed with diabetes in August of this year with an A1c of 6.5.  She started checking her sugars but they were excellent so she has stopped.  She was given a prescription for metformin which she took for maybe a month and then stopped it.  She had no GI side effects on it.  She was then tried on Ozempic which she was only able to take for few weeks and had severe GI side effects so had to be discontinued.  She has not been on any other meds since.  She is very active and exercises 3 times a week but knows she could do better with her eating habits.  She has gained about 5 pounds in the last 6 months.  She does have regular eye exams and has no history of retinopathy.  She has no family history of type 2 diabetes that she is aware of.  She is still working and is very active.  She has sleep apnea but does wear her mask.      Review of Systems   Constitutional:  Negative for chills, fatigue and fever.   Respiratory:  Negative for cough and shortness of breath.    Cardiovascular:  Negative for chest pain and palpitations.   Gastrointestinal:  Negative for diarrhea, nausea and vomiting.   Neurological:  Negative for headaches.       Patient Active Problem List   Diagnosis    Abdominal pain    Abnormal mammogram of both breasts    Anxiety    Asymmetric septal hypertrophy    Breast hematoma, resolving    Calcification of left breast on mammography    Cardiac disease    Cervical neuritis    Chronic throat clearing    Elevated coronary artery calcium score    Female pelvic pain    Hyperlipidemia    Hypothyroid    Kidney stone    Labial lesion    Lipid disorder    Liver lesion    Mechanical low back pain    Murmur     Nasal dryness    Class 1 obesity due to excess calories with body mass index (BMI) of 31.0 to 31.9 in adult    Ovarian cyst    Reflux laryngitis    Skin mole    Snoring    Urinary frequency    UTI symptoms    Vitamin D deficiency    Vocal cord polyp    Urge incontinence    Recurrent nephrolithiasis    Pelvic floor weakness    OSWALDO (obstructive sleep apnea)    Atrophic vaginitis    BMI 32.0-32.9,adult    Urge urinary incontinence    Healthcare maintenance    Advance directive discussed with patient    Type 2 diabetes mellitus without complication, without long-term current use of insulin (Multi)    Chronic fatigue    Left renal stone       Past Medical History:   Diagnosis Date    Encounter for full-term uncomplicated delivery      (spontaneous vaginal delivery)    Encounter for screening for malignant neoplasm of colon 2017    Colon cancer screening    Other conditions influencing health status 2018    History of cough    Other specified postprocedural states     History of surgery on arm    Overactive bladder     Pain in unspecified foot 2017    Acute foot pain    Personal history of other diseases of the circulatory system 2020    History of abnormal electrocardiography    Personal history of other diseases of the musculoskeletal system and connective tissue     History of arthritis    Personal history of other diseases of the respiratory system 2018    History of sinusitis    Personal history of other diseases of the respiratory system 2018    History of sore throat    Personal history of other specified conditions 2019    History of urinary urgency    Personal history of urinary calculi     History of renal calculi    Pure hypercholesterolemia, unspecified     High cholesterol    Sleep apnea     Varicella without complication     Varicella without complication        Past Surgical History:   Procedure Laterality Date    OTHER SURGICAL HISTORY  2020    Elbow  surgery    OTHER SURGICAL HISTORY  03/22/2017    Repair Of Humerus / Arm        Social History     Tobacco Use   Smoking Status Never    Passive exposure: Never   Smokeless Tobacco Never      Social History     Substance and Sexual Activity   Alcohol Use Yes    Alcohol/week: 2.0 standard drinks of alcohol    Types: 1 Shots of liquor, 1 Standard drinks or equivalent per week      Marital status:   Employment: Progressive    Family History   Problem Relation Name Age of Onset    Arthritis Mother      Other (CARDIAC DISORDER) Mother      Other (CVA) Mother      Hyperlipidemia Mother      Osteoporosis Mother      Other (HOCM) Mother      Arthritis Father      Other (CARDIAC DISORDER) Father      Other (CEREBROVASCULAR ACCIDENT) Father      Hyperlipidemia Father      Other (MI) Father      Stroke Father      Hyperlipidemia Sister      Hyperlipidemia Brother      Anxiety disorder Other MAT GM     Depression Other MAT GM     Breast cancer Other MAT AUNT     Other (UTERUS CANCER) Other MAT AUNT         Home Meds:  Current Outpatient Medications   Medication Instructions    aspirin 81 mg EC tablet 1 tablet, Nightly    baclofen (LIORESAL) 10 mg, oral, 3 times daily    busPIRone (BUSPAR) 20 mg, oral, 2 times daily    cholecalciferol (VITAMIN D-3) 50 mcg, Daily    ezetimibe (ZETIA) 10 mg, oral, Daily    FLUoxetine (PROZAC) 20 mg, oral, Daily    levothyroxine (SYNTHROID, LEVOXYL) 75 mcg, oral, Daily, Take 1 tablet daily and double on sundays    omega-3 fatty acids-fish oil 340-1,000 mg capsule 1 capsule, Every evening    potassium citrate CR (Urocit-K-10) 10 mEq ER tablet 10 mEq, oral, 2 times daily (morning and late afternoon), Do not crush, chew, or split.    rosuvastatin (CRESTOR) 40 mg, oral, Daily    solifenacin (VESICARE) 10 mg, Daily        Allergies   Allergen Reactions    Vancomycin Rash    Cephalexin Unknown and Hives    Penicillins Hives and Unknown    Ozempic [Semaglutide] Other     Myalgia, severe fatigue,  "vomiting    Tolterodine Unknown, GI Upset, Other and Nausea/vomiting        Objective   Vitals:    12/19/24 1348   BP: 110/70   Pulse: 62   Resp: 16      Vitals:    12/19/24 1348   Weight: 75.4 kg (166 lb 3.2 oz)      Body mass index is 32.46 kg/m².   Physical Exam  Constitutional:       Appearance: Normal appearance. She is overweight.   HENT:      Head: Normocephalic and atraumatic.   Neck:      Thyroid: No thyroid mass, thyromegaly or thyroid tenderness.   Cardiovascular:      Rate and Rhythm: Normal rate and regular rhythm.      Heart sounds: No murmur heard.     No gallop.   Pulmonary:      Effort: Pulmonary effort is normal.      Breath sounds: Normal breath sounds.   Abdominal:      Palpations: Abdomen is soft.      Comments: benign   Neurological:      General: No focal deficit present.      Mental Status: She is alert and oriented to person, place, and time.      Deep Tendon Reflexes: Reflexes are normal and symmetric.   Psychiatric:         Behavior: Behavior is cooperative.         Labs:  Lab Results   Component Value Date    HGBA1C 6.5 (H) 08/09/2024    TSH 1.19 08/09/2024    FREET4 1.30 05/21/2022      No results found for: \"PR1\", \"THYROIDPAB\", \"TSI\"     Assessment/Plan   Assessment & Plan  Type 2 diabetes mellitus without complication, without long-term current use of insulin (Multi)    Orders:    POCT glycosylated hemoglobin (Hb A1C) manually resulted  69-year-old here for evaluation of type 2 diabetes.  Also with hyperlipidemia.  We reviewed her course.  We discussed medication options and parameters for type 2 diabetes.  We did start with an A1c in the office today which was up but still considered controlled at 6.7.  We discussed options including restarting metformin versus trying Mounjaro versus Jardiance.  She would like to try the metformin for a longer period of time since she did okay on it.  She does have it at home.  I will see her back in 3 to 4 months for recheck.  In the meantime we " discussed the importance of working on eating habits.  I encouraged her to call or message with any concerns or questions    Electronically signed by:  Evie Omer MD 12/19/24  1:48 PM

## 2024-12-19 NOTE — ASSESSMENT & PLAN NOTE
Orders:    POCT glycosylated hemoglobin (Hb A1C) manually resulted  69-year-old here for evaluation of type 2 diabetes.  Also with hyperlipidemia.  We reviewed her course.  We discussed medication options and parameters for type 2 diabetes.  We did start with an A1c in the office today which was up but still considered controlled at 6.7.  We discussed options including restarting metformin versus trying Mounjaro versus Jardiance.  She would like to try the metformin for a longer period of time since she did okay on it.  She does have it at home.  I will see her back in 3 to 4 months for recheck.  In the meantime we discussed the importance of working on eating habits.  I encouraged her to call or message with any concerns or questions

## 2024-12-19 NOTE — PATIENT INSTRUCTIONS
Order placed. Patient notified and verbalized understanding. No questions or concerns. Closing encounter.    Restart metformin  Work on eating habits and keep up the work with exercise  Please call or message with any concerns or questions  Follow-up in 3 to 4 months

## 2024-12-23 DIAGNOSIS — E11.9 TYPE 2 DIABETES MELLITUS WITHOUT COMPLICATION, WITHOUT LONG-TERM CURRENT USE OF INSULIN (MULTI): Primary | ICD-10-CM

## 2024-12-23 RX ORDER — METFORMIN HYDROCHLORIDE 500 MG/1
500 TABLET, EXTENDED RELEASE ORAL
Qty: 180 TABLET | Refills: 1 | Status: SHIPPED | OUTPATIENT
Start: 2024-12-23 | End: 2025-12-23

## 2025-01-10 DIAGNOSIS — R05.3 CHRONIC COUGH: ICD-10-CM

## 2025-01-10 RX ORDER — BACLOFEN 10 MG/1
10 TABLET ORAL 3 TIMES DAILY
Qty: 270 TABLET | Refills: 2 | Status: SHIPPED | OUTPATIENT
Start: 2025-01-10

## 2025-02-11 DIAGNOSIS — F41.9 ANXIETY: ICD-10-CM

## 2025-02-11 DIAGNOSIS — N20.0 RECURRENT NEPHROLITHIASIS: ICD-10-CM

## 2025-02-11 RX ORDER — FLUOXETINE HYDROCHLORIDE 20 MG/1
20 CAPSULE ORAL DAILY
Qty: 90 CAPSULE | Refills: 0 | Status: SHIPPED | OUTPATIENT
Start: 2025-02-11

## 2025-02-11 RX ORDER — POTASSIUM CITRATE 10 MEQ/1
TABLET, EXTENDED RELEASE ORAL
Qty: 180 TABLET | Refills: 0 | Status: SHIPPED | OUTPATIENT
Start: 2025-02-11

## 2025-02-19 ENCOUNTER — PATIENT MESSAGE (OUTPATIENT)
Dept: PRIMARY CARE | Facility: CLINIC | Age: 70
End: 2025-02-19
Payer: COMMERCIAL

## 2025-03-02 LAB
CHOLEST SERPL-MCNC: 119 MG/DL
CHOLEST/HDLC SERPL: 2 (CALC)
EST. AVERAGE GLUCOSE BLD GHB EST-MCNC: 146 MG/DL
EST. AVERAGE GLUCOSE BLD GHB EST-SCNC: 8.1 MMOL/L
HBA1C MFR BLD: 6.7 % OF TOTAL HGB
HDLC SERPL-MCNC: 59 MG/DL
LDLC SERPL CALC-MCNC: 46 MG/DL (CALC)
NONHDLC SERPL-MCNC: 60 MG/DL (CALC)
TRIGL SERPL-MCNC: 67 MG/DL

## 2025-03-03 ENCOUNTER — APPOINTMENT (OUTPATIENT)
Dept: CARDIOLOGY | Facility: CLINIC | Age: 70
End: 2025-03-03
Payer: COMMERCIAL

## 2025-03-03 ENCOUNTER — APPOINTMENT (OUTPATIENT)
Dept: CARDIOLOGY | Facility: HOSPITAL | Age: 70
End: 2025-03-03
Payer: COMMERCIAL

## 2025-03-06 ENCOUNTER — HOSPITAL ENCOUNTER (OUTPATIENT)
Dept: RADIOLOGY | Facility: HOSPITAL | Age: 70
Discharge: HOME | End: 2025-03-06
Payer: COMMERCIAL

## 2025-03-06 DIAGNOSIS — N20.0 LEFT RENAL STONE: ICD-10-CM

## 2025-03-06 PROCEDURE — 74018 RADEX ABDOMEN 1 VIEW: CPT

## 2025-03-09 NOTE — PROGRESS NOTES
Primary Care Physician: Divya Aaron, APRN-CNP  Date of Visit: 03/10/2025  8:20 AM EDT  Location of visit: [facility] [address]   Last office visit: 4/25/2024    Chief Complaint:     Follow-up hypertrophic cardiomyopathy, hyperlipidemia, type 2 diabetes.    HPI/Summary  Susan Rowan is a 69 y.o. female who presents for followup cardiology evaluation.     In 2017, we noted a coronary calcium score of 276, with calcification in the RCA.  A stress test in 2017 was negative at a workload of 9.3 METS.  A screening ECG on February 5, 2020 showed leftward axis but was otherwise normal.  In 2022, we reevaluated the patient after an echocardiogram was performed.  The patient's mother was diagnosed with hypertrophic cardiomyopathy many years earlier.  The echocardiogram showed a septal thickness of 15 mm, posterior wall thickness of 10 mm but no other pertinent findings.  Cardiac MRI on November 14, 2022 showed a small LV, normal systolic function with an ejection fraction of 69%, mild to moderate basal septal hypertrophy with a maximal septal thickness of 15 mm, and no abnormal delayed contrast enhancement to suggest scar.  A follow-up echocardiogram on March 18, 2024 showed asymmetric hypertrophy of the interventricular septum, with a maximum thickness of 14 mm, no HERB and no LVOT obstruction.  A 7-day Holter monitor showed rare ventricular and supraventricular premature beats, and a few brief episodes of SVT, the longest 19 beats.  She was seen by the hypertrophic cardiomyopathy center, thought to have a phenotypically mild form of nonobstructive HCM, and she was asymptomatic.  Recommended additional family screening and genetic testing.  The genetic testing showed a VUS no diagnostic findings.  A repeat coronary calcium score on November 4, 2024 was 589.  Rosuvastatin has been titrated upward.    The patient continues on low-dose aspirin, ezetimibe 10 mg daily, metformin 500 mg twice daily, and rosuvastatin 40 mg daily.   Metformin just instituted in December, 2024    Recent laboratory evaluation notable for hemoglobin A1c 6.7%.  Cholesterol 119, HDL 59, LDL 46, triglycerides 67, reflecting up titration of the rosuvastatin.    Works out 3 times weekly at a gym. Weight training. Has lost 5 pounds.  Remains asymptomatic, no side effects from high-dose rosuvastatin.  Did not do well on Ozempic, has appointment with endocrinology and may consider Mounjaro.    Specialty Problems          Cardiology Problems    Asymmetric septal hypertrophy    Cardiac disease    Elevated coronary artery calcium score    Hyperlipidemia    Lipid disorder    Murmur      Social History     Tobacco Use    Smoking status: Never     Passive exposure: Never    Smokeless tobacco: Never   Vaping Use    Vaping status: Never Used   Substance Use Topics    Alcohol use: Yes     Alcohol/week: 2.0 standard drinks of alcohol     Types: 1 Shots of liquor, 1 Standard drinks or equivalent per week    Drug use: Never      Allergies   Allergen Reactions    Vancomycin Rash    Cephalexin Unknown and Hives    Penicillins Hives and Unknown    Ozempic [Semaglutide] Other     Myalgia, severe fatigue, vomiting    Tolterodine Unknown, GI Upset, Other and Nausea/vomiting     Current Outpatient Medications   Medication Instructions    aspirin 81 mg EC tablet 1 tablet, Nightly    baclofen (LIORESAL) 10 mg, oral, 3 times daily    busPIRone (BUSPAR) 20 mg, oral, 2 times daily    cholecalciferol (VITAMIN D-3) 50 mcg, Daily    ezetimibe (ZETIA) 10 mg, oral, Daily    FLUoxetine (PROZAC) 20 mg, oral, Daily    levothyroxine (SYNTHROID, LEVOXYL) 75 mcg, oral, Daily, Take 1 tablet daily and double on sundays    metFORMIN XR (GLUCOPHAGE-XR) 500 mg, oral, 2 times daily (morning and late afternoon), Do not crush, chew, or split.    omega-3 fatty acids-fish oil 340-1,000 mg capsule 1 capsule, Every evening    potassium citrate CR (Urocit-K-10) 10 mEq ER tablet TAKE 1 TABLET BY MOUTH 2 TIMES A DAY WITH  "MEALS. DO NOT CRUSH, CHEW OR SPLIT.    rosuvastatin (CRESTOR) 40 mg, oral, Daily    solifenacin (VESICARE) 10 mg, Daily       Review of Systems   Cardiovascular:  Negative for chest pain, claudication, cyanosis, dyspnea on exertion, irregular heartbeat, leg swelling, near-syncope, orthopnea, palpitations, paroxysmal nocturnal dyspnea and syncope.       Vital Signs:  Vitals:    03/10/25 0834   BP: 120/79   BP Location: Left arm   Patient Position: Sitting   BP Cuff Size: Adult   Pulse: 74   SpO2: 94%   Weight: 72.9 kg (160 lb 12.8 oz)   Height: 1.499 m (4' 11\")     Wt Readings from Last 2 Encounters:   03/10/25 72.9 kg (160 lb 12.8 oz)   12/19/24 75.4 kg (166 lb 3.2 oz)     Body mass index is 32.48 kg/m².     Physical Exam:    On examination she appeared in good health and spirits. Vital signs as documented. Skin warm and dry and without overt rashes. Neck without JVD. Lungs clear. Heart exam notable for regular rhythm, normal sounds and absence of murmurs, rubs or gallops. Abdomen unremarkable and without evidence of organomegaly, masses, or abdominal aortic enlargement. Extremities nonedematous.     Lab Review:  CBC:  Lab Results   Component Value Date    WBC 5.4 12/02/2024    HGB 13.9 12/02/2024    HCT 42.6 12/02/2024    MCV 93 12/02/2024     12/02/2024       CMP:  Recent Labs     12/02/24  0758   GLUCOSE 120*      K 4.0      CO2 27   ANIONGAP 12   BUN 21   CREATININE 0.83   EGFR 77   ALBUMIN 4.0   ALKPHOS 54   PROT 6.3*   ALT 20   AST 17   BILITOT 0.8         LIPID PANEL:  Lab Results   Component Value Date    CHOL 119 03/01/2025    HDL 59 03/01/2025    CHHDL 2.0 03/01/2025    VLDL 28 08/09/2024    TRIG 67 03/01/2025    NHDL 60 03/01/2025       HEME/ENDO:  Lab Results   Component Value Date    HGBA1C 6.7 (H) 03/01/2025    HGBA1C 6.7 (A) 12/19/2024    TSH 1.19 08/09/2024         No results for input(s): \"BNP\" in the last 67560 hours.  Recent Cardiology Tests:    ECG:    Reviewed today's ECG, " normal sinus rhythm, normal ECG.  Heart rate 75/min.    Results for orders placed in visit on 12/02/24    ECG 12 lead    Narrative  Normal sinus rhythm  Possible Anterior infarct , age undetermined  Abnormal ECG  No previous ECGs available  Confirmed by Rahat Bowman ([id number]) on 12/2/2024 12:48:19 PM       Echo:  Echo Results:  Transthoracic Echo (TTE) Complete 03/18/2024    [facility], [address] [place] [address]  Tel [phone] and Fax [phone]    TRANSTHORACIC ECHOCARDIOGRAM REPORT      Patient Name:      STEPAN WATSON        Reading Physician:    [id number] Roe Weeks MD  Study Date:        3/18/2024            Ordering Provider:    [id number] CAROL WYATT  MRN/PID:           [id number]             Fellow:  Accession#:        [id number]         Nurse:  Date of Birth/Age: 1955 / 68 years Sonographer:          Juliet Watkins [facility]  Gender:            F                    Additional Staff:  Height:            149.86 cm            Admit Date:  Weight:            71.67 kg             Admission Status:     Outpatient  BSA / BMI:         1.67 m2 / 31.91      Encounter#:           [id number]  kg/m2  Department Location:  [facility]  Echo Lab  Blood Pressure: 134 /80 mmHg    Study Type:    TRANSTHORACIC ECHO (TTE) COMPLETE  Diagnosis/ICD: Other hypertrophic cardiomyopathy-I42.2  Indication:    Septal hypertrophy  CPT Code:      Echo Complete w Full [id number]    Patient History:  BMI:               Overweight 25 - 30  Pertinent History: Chest Pain. Septal hypertrophy,DLD.    Study Detail: The following Echo studies were performed: 2D, M-Mode, Doppler and  color flow.      PHYSICIAN INTERPRETATION:  Left Ventricle: The left ventricular systolic function is normal, with an estimated ejection fraction of 65%. There are no regional wall motion abnormalities. The left ventricular cavity size is normal. There is mild asymmetric left ventricular hypertrophy  involving the septal wall and basal wall. Spectral Doppler shows an impaired relaxation pattern of left ventricular diastolic filling.  Left Atrium: The left atrium is normal in size.  Right Ventricle: The right ventricle is normal in size. There is normal right ventricular global systolic function.  Right Atrium: The right atrium is normal in size.  Aortic Valve: The aortic valve is trileaflet. There is no evidence of aortic valve regurgitation. The peak instantaneous gradient of the aortic valve is 8.3 mmHg.  Mitral Valve: The mitral valve is normal in structure. There is trace mitral valve regurgitation.  Tricuspid Valve: The tricuspid valve is structurally normal. No evidence of tricuspid regurgitation.  Pulmonic Valve: The pulmonic valve is structurally normal. There is mild pulmonic valve regurgitation.  Pericardium: There is no pericardial effusion noted.  Aorta: The aortic root is normal.  Systemic Veins: The inferior vena cava appears dilated. There is IVC inspiratory collapse greater than 50%.      CONCLUSIONS:  1. Left ventricular systolic function is normal with a 65% estimated ejection fraction.  2. Spectral Doppler shows an impaired relaxation pattern of left ventricular diastolic filling.  3. Asymmetric hypertrophy of the interventricular septum (maximal anteroseptal thickness 1.4 cm).  4. No systolic anterior motion of the mitral valve/left ventricular outflow tract obstruction seen at rest or with Valsalva.  5. No significant changes compared to prior echocardiogram dated 9/9/2022.    QUANTITATIVE DATA SUMMARY:  2D MEASUREMENTS:  Normal Ranges:  LAs:           4.76 cm   (2.7-4.0cm)  IVSd:          1.32 cm   (0.6-1.1cm)  LVPWd:         0.91 cm   (0.6-1.1cm)  LVIDd:         3.27 cm   (3.9-5.9cm)  LVIDs:         2.10 cm  LV Mass Index: 66.0 g/m2  LV % FS        35.7 %    LA VOLUME:  Normal Ranges:  LA Vol A4C:        35.0 ml    (22+/-6mL/m2)  LA Vol A2C:        29.9 ml  LA Vol BP:         35.0 ml  LA  Vol Index A4C:  21.0 ml/m2  LA Vol Index A2C:  17.9 ml/m2  LA Vol Index BP:   21.0 ml/m2  LA Area A4C:       13.0 cm2  LA Area A2C:       13.0 cm2  LA Major Axis A4C: 4.1 cm  LA Major Axis A2C: 4.8 cm  LA Volume Index:   21.0 ml/m2  LA Vol A4C:        34.6 ml  LA Vol A2C:        28.0 ml    M-MODE MEASUREMENTS:  Normal Ranges:  Ao Root: 3.10 cm (2.0-3.7cm)  LAs:     4.80 cm (2.7-4.0cm)    AORTA MEASUREMENTS:  Normal Ranges:  Asc Ao, d: 3.60 cm (2.1-3.4cm)  Ao Arch:   2.80 cm (2.0-3.6cm)    LV SYSTOLIC FUNCTION BY 2D PLANIMETRY (MOD):  Normal Ranges:  EF-A4C View: 60.5 % (>=55%)  EF-A2C View: 65.7 %  EF-Biplane:  62.4 %    LV DIASTOLIC FUNCTION:  Normal Ranges:  MV Peak E:        0.62 m/s    (0.7-1.2 m/s)  MV Peak A:        0.90 m/s    (0.42-0.7 m/s)  E/A Ratio:        0.69        (1.0-2.2)  MV e'             0.09 m/s    (>8.0)  MV lateral e'     0.11 m/s  MV medial e'      0.07 m/s  MV A Dur:         121.79 msec  E/e' Ratio:       6.88        (<8.0)  PulmV Sys Diony:    43.79 cm/s  PulmV Roche Diony:   26.56 cm/s  PulmV S/D Diony:    1.65  PulmV A Revs Diony: 24.26 cm/s  PulmV A Revs Dur: 105.61 msec    MITRAL VALVE:  Normal Ranges:  MV DT: 204 msec (150-240msec)    AORTIC VALVE:  Normal Ranges:  AoV Vmax:      1.44 m/s (<=1.7m/s)  AoV Peak P.3 mmHg (<20mmHg)  LVOT Max Diony:  1.14 m/s (<=1.1m/s)  LVOT VTI:      22.23 cm  LVOT Diameter: 1.90 cm  (1.8-2.4cm)  AoV Area,Vmax: 2.24 cm2 (2.5-4.5cm2)      RIGHT VENTRICLE:  RV Basal 2.80 cm  RV Mid   1.60 cm  RV Major 5.4 cm  TAPSE:   19.0 mm  RV s'    0.11 m/s    TRICUSPID VALVE/RVSP:  Normal Ranges:  IVC Diam: 2.20 cm    PULMONIC VALVE:  Normal Ranges:  PV Accel Time: 108 msec (>120ms)  PV Max Diony:    1.1 m/s  (0.6-0.9m/s)  PV Max P.9 mmHg    Pulmonary Veins:  PulmV A Revs Dur: 105.61 msec  PulmV A Revs Diony: 24.26 cm/s  PulmV Roche Diony:   26.56 cm/s  PulmV S/D Diony:    1.65  PulmV Sys Diony:    43.79 cm/s      [id number] Roe Weeks MD  Electronically signed on 3/18/2024 at  11:26:19 AM        ** Final **       Cath:      Stress Test:  Stress Results:  No results found for this or any previous visit from the past 365 days.         Cardiac Imaging:        Assessment/Plan   The patient is doing very well at this time.  She does have a mild form of hypertrophic cardiomyopathy, genetic testing shows no diagnostic marker, and imaging does not suggest a high scar burden.  No significant arrhythmias have been detected.  Blood pressures are well-controlled without the need for any medication.  Lipids are now at goal with up titration of rosuvastatin.  She is exercising regularly, and has lost a few pounds.  She has follow-up with endocrinology and also plans to be seen by nephrology for evaluation/treatment of for lithiasis.  A urine albumin/creatinine should be obtained.  We have no other cardiology suggestions at this time.  Recommend follow-up with HCM center yearly, and also she can return to our office in 6 to 12 months or as clinically indicated    Orders:  Orders Placed This Encounter   Procedures    ECG 12 lead (Clinic Performed)      Followup Appts:  Future Appointments   Date Time Provider Department Center   3/14/2025  4:00 PM HARDY Morales-CNP [id number] [facility]   3/18/2025 10:20 AM Ann Velasquez MD [id number] [facility]   4/15/2025 10:45 AM Evie Omer MD [id number] [facility]   5/8/2025  8:00 AM Curt Rodriguez MD [id number] [facility]   [id number]   10/13/2025  8:00 AM María Elena Flores PA-C [id number] [facility]           ____________________________________________________________  Mp Andersen MD    Senior Attending Physician  [facility]  [facility] Chair for Cardiovascular Excellence  [employer]

## 2025-03-10 ENCOUNTER — OFFICE VISIT (OUTPATIENT)
Dept: CARDIOLOGY | Facility: CLINIC | Age: 70
End: 2025-03-10
Payer: COMMERCIAL

## 2025-03-10 VITALS
SYSTOLIC BLOOD PRESSURE: 120 MMHG | BODY MASS INDEX: 32.42 KG/M2 | OXYGEN SATURATION: 94 % | DIASTOLIC BLOOD PRESSURE: 79 MMHG | WEIGHT: 160.8 LBS | HEART RATE: 74 BPM | HEIGHT: 59 IN

## 2025-03-10 DIAGNOSIS — R93.1 ELEVATED CORONARY ARTERY CALCIUM SCORE: ICD-10-CM

## 2025-03-10 DIAGNOSIS — I42.2 HYPERTROPHIC CARDIOMYOPATHY (MULTI): Primary | ICD-10-CM

## 2025-03-10 DIAGNOSIS — E78.5 HYPERLIPIDEMIA, UNSPECIFIED HYPERLIPIDEMIA TYPE: ICD-10-CM

## 2025-03-10 PROCEDURE — 3078F DIAST BP <80 MM HG: CPT | Performed by: INTERNAL MEDICINE

## 2025-03-10 PROCEDURE — G2211 COMPLEX E/M VISIT ADD ON: HCPCS | Performed by: INTERNAL MEDICINE

## 2025-03-10 PROCEDURE — 3074F SYST BP LT 130 MM HG: CPT | Performed by: INTERNAL MEDICINE

## 2025-03-10 PROCEDURE — 3008F BODY MASS INDEX DOCD: CPT | Performed by: INTERNAL MEDICINE

## 2025-03-10 PROCEDURE — 1036F TOBACCO NON-USER: CPT | Performed by: INTERNAL MEDICINE

## 2025-03-10 PROCEDURE — 93010 ELECTROCARDIOGRAM REPORT: CPT | Performed by: INTERNAL MEDICINE

## 2025-03-10 PROCEDURE — 1160F RVW MEDS BY RX/DR IN RCRD: CPT | Performed by: INTERNAL MEDICINE

## 2025-03-10 PROCEDURE — 93005 ELECTROCARDIOGRAM TRACING: CPT | Performed by: INTERNAL MEDICINE

## 2025-03-10 PROCEDURE — 1157F ADVNC CARE PLAN IN RCRD: CPT | Performed by: INTERNAL MEDICINE

## 2025-03-10 PROCEDURE — 1126F AMNT PAIN NOTED NONE PRSNT: CPT | Performed by: INTERNAL MEDICINE

## 2025-03-10 PROCEDURE — 99214 OFFICE O/P EST MOD 30 MIN: CPT | Performed by: INTERNAL MEDICINE

## 2025-03-10 PROCEDURE — 1159F MED LIST DOCD IN RCRD: CPT | Performed by: INTERNAL MEDICINE

## 2025-03-10 PROCEDURE — 99214 OFFICE O/P EST MOD 30 MIN: CPT | Mod: 25 | Performed by: INTERNAL MEDICINE

## 2025-03-10 RX ORDER — EZETIMIBE 10 MG/1
10 TABLET ORAL DAILY
Qty: 90 TABLET | Refills: 3 | Status: SHIPPED | OUTPATIENT
Start: 2025-03-10 | End: 2026-03-10

## 2025-03-10 ASSESSMENT — ENCOUNTER SYMPTOMS
NEAR-SYNCOPE: 0
LOSS OF SENSATION IN FEET: 0
DYSPNEA ON EXERTION: 0
PND: 0
PALPITATIONS: 0
ORTHOPNEA: 0
CLAUDICATION: 0
SYNCOPE: 0
OCCASIONAL FEELINGS OF UNSTEADINESS: 0
DEPRESSION: 0
IRREGULAR HEARTBEAT: 0

## 2025-03-10 ASSESSMENT — COLUMBIA-SUICIDE SEVERITY RATING SCALE - C-SSRS
2. HAVE YOU ACTUALLY HAD ANY THOUGHTS OF KILLING YOURSELF?: NO
6. HAVE YOU EVER DONE ANYTHING, STARTED TO DO ANYTHING, OR PREPARED TO DO ANYTHING TO END YOUR LIFE?: NO
1. IN THE PAST MONTH, HAVE YOU WISHED YOU WERE DEAD OR WISHED YOU COULD GO TO SLEEP AND NOT WAKE UP?: NO

## 2025-03-10 ASSESSMENT — PAIN SCALES - GENERAL: PAINLEVEL_OUTOF10: 0-NO PAIN

## 2025-03-11 LAB
ATRIAL RATE: 75 BPM
P AXIS: 66 DEGREES
P OFFSET: 183 MS
P ONSET: 134 MS
PR INTERVAL: 158 MS
Q ONSET: 213 MS
QRS COUNT: 12 BEATS
QRS DURATION: 80 MS
QT INTERVAL: 436 MS
QTC CALCULATION(BAZETT): 486 MS
QTC FREDERICIA: 469 MS
R AXIS: -22 DEGREES
T AXIS: 39 DEGREES
T OFFSET: 431 MS
VENTRICULAR RATE: 75 BPM

## 2025-03-12 DIAGNOSIS — E78.00 PURE HYPERCHOLESTEROLEMIA: ICD-10-CM

## 2025-03-12 RX ORDER — ROSUVASTATIN CALCIUM 40 MG/1
40 TABLET, COATED ORAL DAILY
Qty: 90 TABLET | Refills: 3 | Status: SHIPPED | OUTPATIENT
Start: 2025-03-12 | End: 2026-03-12

## 2025-03-14 ENCOUNTER — APPOINTMENT (OUTPATIENT)
Dept: NEPHROLOGY | Facility: CLINIC | Age: 70
End: 2025-03-14
Payer: COMMERCIAL

## 2025-03-14 VITALS
BODY MASS INDEX: 33.12 KG/M2 | TEMPERATURE: 98 F | WEIGHT: 164 LBS | OXYGEN SATURATION: 92 % | DIASTOLIC BLOOD PRESSURE: 72 MMHG | SYSTOLIC BLOOD PRESSURE: 117 MMHG | HEART RATE: 81 BPM

## 2025-03-14 DIAGNOSIS — R82.991 HYPOCITRATURIA: Primary | ICD-10-CM

## 2025-03-14 DIAGNOSIS — N20.0 RECURRENT NEPHROLITHIASIS: ICD-10-CM

## 2025-03-14 PROCEDURE — 3074F SYST BP LT 130 MM HG: CPT | Performed by: NURSE PRACTITIONER

## 2025-03-14 PROCEDURE — 99204 OFFICE O/P NEW MOD 45 MIN: CPT | Performed by: NURSE PRACTITIONER

## 2025-03-14 PROCEDURE — 1157F ADVNC CARE PLAN IN RCRD: CPT | Performed by: NURSE PRACTITIONER

## 2025-03-14 PROCEDURE — 1126F AMNT PAIN NOTED NONE PRSNT: CPT | Performed by: NURSE PRACTITIONER

## 2025-03-14 PROCEDURE — 3078F DIAST BP <80 MM HG: CPT | Performed by: NURSE PRACTITIONER

## 2025-03-14 ASSESSMENT — PAIN SCALES - GENERAL: PAINLEVEL_OUTOF10: 0-NO PAIN

## 2025-03-15 LAB
25(OH)D3+25(OH)D2 SERPL-MCNC: 98 NG/ML (ref 30–100)
PTH-INTACT SERPL-MCNC: 35 PG/ML (ref 16–77)
URATE SERPL-MCNC: 3.2 MG/DL (ref 2.5–7)

## 2025-03-18 ENCOUNTER — APPOINTMENT (OUTPATIENT)
Dept: UROLOGY | Facility: CLINIC | Age: 70
End: 2025-03-18
Payer: COMMERCIAL

## 2025-03-18 VITALS — TEMPERATURE: 96.6 F

## 2025-03-18 DIAGNOSIS — N20.0 RECURRENT NEPHROLITHIASIS: Primary | ICD-10-CM

## 2025-03-18 PROCEDURE — 1159F MED LIST DOCD IN RCRD: CPT | Performed by: UROLOGY

## 2025-03-18 PROCEDURE — 99214 OFFICE O/P EST MOD 30 MIN: CPT | Performed by: UROLOGY

## 2025-03-18 PROCEDURE — 1036F TOBACCO NON-USER: CPT | Performed by: UROLOGY

## 2025-03-18 PROCEDURE — 1157F ADVNC CARE PLAN IN RCRD: CPT | Performed by: UROLOGY

## 2025-03-18 PROCEDURE — 1126F AMNT PAIN NOTED NONE PRSNT: CPT | Performed by: UROLOGY

## 2025-03-18 ASSESSMENT — PAIN SCALES - GENERAL: PAINLEVEL_OUTOF10: 0-NO PAIN

## 2025-03-18 NOTE — PROGRESS NOTES
Subjective   Susan Rowan is a 69 y.o. female with history of urge incontinence and recurrent nephrolithiasis s/p L ESWL on 2024; presenting today for a follow up visit. Patient is following up with nephrology, currently on K citrate 10 mEq BID. Denies any recent gross hematuria, fevers, chills, urinary retention, intractable flank or abdominal pain, nausea or vomiting.            Past Medical History:   Diagnosis Date    Encounter for full-term uncomplicated delivery      (spontaneous vaginal delivery)    Encounter for screening for malignant neoplasm of colon 2017    Colon cancer screening    Other conditions influencing health status 2018    History of cough    Other specified postprocedural states     History of surgery on arm    Overactive bladder     Pain in unspecified foot 2017    Acute foot pain    Personal history of other diseases of the circulatory system 2020    History of abnormal electrocardiography    Personal history of other diseases of the musculoskeletal system and connective tissue     History of arthritis    Personal history of other diseases of the respiratory system 2018    History of sinusitis    Personal history of other diseases of the respiratory system 2018    History of sore throat    Personal history of other specified conditions 2019    History of urinary urgency    Personal history of urinary calculi     History of renal calculi    Pure hypercholesterolemia, unspecified     High cholesterol    Sleep apnea     Varicella without complication     Varicella without complication     Past Surgical History:   Procedure Laterality Date    OTHER SURGICAL HISTORY  2020    Elbow surgery    OTHER SURGICAL HISTORY  2017    Repair Of Humerus / Arm     Family History   Problem Relation Name Age of Onset    Arthritis Mother      Other (CARDIAC DISORDER) Mother      Other (CVA) Mother      Hyperlipidemia Mother      Osteoporosis Mother       Other (HOCM) Mother      Arthritis Father      Other (CARDIAC DISORDER) Father      Other (CEREBROVASCULAR ACCIDENT) Father      Hyperlipidemia Father      Other (MI) Father      Stroke Father      Hyperlipidemia Sister      Hyperlipidemia Brother      Anxiety disorder Other MAT GM     Depression Other MAT GM     Breast cancer Other MAT AUNT     Other (UTERUS CANCER) Other MAT AUNT      Current Outpatient Medications   Medication Sig Dispense Refill    aspirin 81 mg EC tablet Take 1 tablet (81 mg) by mouth once daily at bedtime.      baclofen (Lioresal) 10 mg tablet Take 1 tablet (10 mg) by mouth 3 times a day. 270 tablet 2    busPIRone (Buspar) 10 mg tablet Take 2 tablets (20 mg) by mouth 2 times a day. 360 tablet 3    cholecalciferol (Vitamin D-3) 25 MCG (1000 UT) capsule Take 2 capsules (50 mcg) by mouth once daily.      ezetimibe (Zetia) 10 mg tablet Take 1 tablet (10 mg) by mouth once daily. 90 tablet 3    FLUoxetine (PROzac) 20 mg capsule TAKE ONE CAPSULE BY MOUTH ONCE DAILY 90 capsule 0    levothyroxine (Synthroid, Levoxyl) 75 mcg tablet Take 1 tablet (75 mcg) by mouth once daily. Take 1 tablet daily and double on sundays 90 tablet 3    metFORMIN XR (Glucophage-XR) 500 mg 24 hr tablet Take 1 tablet (500 mg) by mouth 2 times daily (morning and late afternoon). Do not crush, chew, or split. 180 tablet 1    potassium citrate CR (Urocit-K-10) 10 mEq ER tablet TAKE 1 TABLET BY MOUTH 2 TIMES A DAY WITH MEALS. DO NOT CRUSH, CHEW OR SPLIT. 180 tablet 0    rosuvastatin (Crestor) 40 mg tablet Take 1 tablet (40 mg) by mouth once daily. 90 tablet 3    solifenacin (VESIcare) 10 mg tablet Take 1 tablet (10 mg) by mouth once daily. Swallow tablet whole; do not crush, chew, or split.       No current facility-administered medications for this visit.     Allergies   Allergen Reactions    Vancomycin Rash    Cephalexin Unknown and Hives    Penicillins Hives and Unknown    Ozempic [Semaglutide] Other     Myalgia, severe  fatigue, vomiting    Tolterodine Unknown, GI Upset, Other and Nausea/vomiting     Social History     Socioeconomic History    Marital status:      Spouse name: Not on file    Number of children: Not on file    Years of education: Not on file    Highest education level: Not on file   Occupational History    Not on file   Tobacco Use    Smoking status: Never     Passive exposure: Never    Smokeless tobacco: Never   Vaping Use    Vaping status: Never Used   Substance and Sexual Activity    Alcohol use: Yes     Alcohol/week: 2.0 standard drinks of alcohol     Types: 1 Shots of liquor, 1 Standard drinks or equivalent per week    Drug use: Never    Sexual activity: Yes   Other Topics Concern    Not on file   Social History Narrative    Not on file     Social Drivers of Health     Financial Resource Strain: Not on file   Food Insecurity: Not on file   Transportation Needs: Not on file   Physical Activity: Not on file   Stress: Not on file   Social Connections: Not on file   Intimate Partner Violence: Not on file   Housing Stability: Not on file       Review of Systems  Pertinent items are noted in HPI.    Objective       Lab Review  Lab Results   Component Value Date    WBC 5.4 12/02/2024    RBC 4.56 12/02/2024    HGB 13.9 12/02/2024    HCT 42.6 12/02/2024     12/02/2024      Lab Results   Component Value Date    BUN 21 12/02/2024    CREATININE 0.83 12/02/2024          Assessment/Plan   Diagnoses and all orders for this visit:  Recurrent nephrolithiasis  -     XR abdomen 1 view; Future  -     US renal complete; Future    Nephrolithiasis s/p L ESWL on 12/13/2024    I reviewed KUB from 3/6/2025 which showed complete resolution of nephrolithiasis.  Reviewed personally.      We will follow up annually with KUB and renal US prior.       All questions were answered to the patient's satisfaction. Patient agrees with the plan and wishes to proceed. Follow-up will be scheduled appropriately.     Scribed for Dr. Velasquez  by Elva Roach . I , Dr Velasquez, have personally reviewed and agreed with the information entered by the Virtual Scribe.

## 2025-03-20 ASSESSMENT — ENCOUNTER SYMPTOMS
CARDIOVASCULAR NEGATIVE: 1
CONSTITUTIONAL NEGATIVE: 1
MUSCULOSKELETAL NEGATIVE: 1
HEMATOLOGIC/LYMPHATIC NEGATIVE: 1
RESPIRATORY NEGATIVE: 1
NEUROLOGICAL NEGATIVE: 1
EYES NEGATIVE: 1
GASTROINTESTINAL NEGATIVE: 1
ENDOCRINE NEGATIVE: 1
PSYCHIATRIC NEGATIVE: 1

## 2025-03-20 NOTE — PROGRESS NOTES
History Of Present Illness  Susan Rowan is a 69 y.o. female with medical history significant for urge incontinence and recurrent nephrolithiasis s/p L ESWL (12/13/2024) who presents for an initial evaluation for recurrent kidney stones.     Past Medical History  As above.    Surgical History  She has a past surgical history that includes Other surgical history (03/06/2020) and Other surgical history (03/22/2017).     Social History  She reports that she has never smoked. She has never been exposed to tobacco smoke. She has never used smokeless tobacco. She reports current alcohol use of about 2.0 standard drinks of alcohol per week. She reports that she does not use drugs.    Family History  Family History   Problem Relation Name Age of Onset    Arthritis Mother      Other (CARDIAC DISORDER) Mother      Other (CVA) Mother      Hyperlipidemia Mother      Osteoporosis Mother      Other (HOCM) Mother      Arthritis Father      Other (CARDIAC DISORDER) Father      Other (CEREBROVASCULAR ACCIDENT) Father      Hyperlipidemia Father      Other (MI) Father      Stroke Father      Hyperlipidemia Sister      Hyperlipidemia Brother      Anxiety disorder Other MAT GM     Depression Other MAT GM     Breast cancer Other MAT AUNT     Other (UTERUS CANCER) Other MAT AUNT         Allergies  Vancomycin, Cephalexin, Penicillins, Ozempic [semaglutide], and Tolterodine    Review of Systems   Constitutional: Negative.    HENT: Negative.     Eyes: Negative.    Respiratory: Negative.     Cardiovascular: Negative.    Gastrointestinal: Negative.    Endocrine: Negative.    Genitourinary:  Positive for urgency.   Musculoskeletal: Negative.    Skin: Negative.    Neurological: Negative.    Hematological: Negative.    Psychiatric/Behavioral: Negative.          Physical Exam  Vitals reviewed.   Constitutional:       Appearance: Normal appearance.   HENT:      Mouth/Throat:      Mouth: Mucous membranes are moist.   Cardiovascular:      Rate and  Rhythm: Normal rate and regular rhythm.      Pulses: Normal pulses.      Heart sounds: Normal heart sounds.   Pulmonary:      Effort: Pulmonary effort is normal.      Breath sounds: Normal breath sounds.   Musculoskeletal:         General: Normal range of motion.   Skin:     General: Skin is warm and dry.   Neurological:      General: No focal deficit present.      Mental Status: She is alert and oriented to person, place, and time.   Psychiatric:         Mood and Affect: Mood normal.         Behavior: Behavior normal.         Thought Content: Thought content normal.         Judgment: Judgment normal.          Last Recorded Vitals  Blood pressure 117/72, pulse 81, temperature 36.7 °C (98 °F), temperature source Temporal, weight 74.4 kg (164 lb), SpO2 92%, not currently breastfeeding.    Relevant Results  Kidney stone urine panels (24 hr urine collection) (2/20/23):   - Urine volume: 1.53 L/24 hr.  - SS CaOx: 6.60.  - Urine Calcium: 99 mg/24 hr.  - Urine Oxalate: 32 mg/24 hr.  - Urine Citrate: 283 mg/24 hr. (Reference range: male > 450; female > 550)  - SS CaP: 0.89.  - 24 hr urine pH: 6.326.  - SS uric acid: 0.29.  - Urine uric acid: 0.404 g/24 hr.     Assessment/Plan     69 y.o. female with medical history significant for urge incontinence and recurrent nephrolithiasis s/p L ESWL (12/13/2024) who presents for an initial evaluation for recurrent kidney stones.    # Kidney stones: recurrent. Has had several episodes of kidney stones. s/p L ESWL (12/13/2024). Follows up with Urology regularly with annual renal imaging. Previous kidney stone urine panels (24 hr urine collection) (2/20/23) showed suboptimal urine volume (1.53 L/24 hr), marked hypocitraturia, borderline high urine pH, and mild CaOx stone risk. Patient reports no strong family history of kidney stones. Patient denies any digestive diseases and surgeries such as inflammatory bowel disease (IBD), ulcerative colitis, crohn’s disease, or gastric bypass  "surgeries. Patient denies parathyroid/thyroid surgeries. Patient does not take calcium supplements. Patient reports that she has done dietary modifications since the first episode but she \"could do better job\".     # Hypocitraturia: per previous kidney stone urine panels (24 hr urine collection) (23). Currently on potassium citrate 10 mEq BID.    Plan:    - Labs: uric acid, PTH, Vit D.    - Continue potassium citrate.    - Reviewed dietary modifications required to help prevent recurrent stone formation includin) Daily water intake of 2.5 - 3 L.      2) Dietary modification: low animal protein (limiting intake to <8 ounces/daily); low sodium diet (< or = 2000mg/day); low oxalate diet; high fiber diet; citric acid supplementation in the form of lemon juice; avoid high doses of vitamin C supplements (>500mg); avoid drinking large amounts of orange juice.      3) Avoid high oxalate foods (dark green leafy vegetables, nuts, rhubarb, chocolate, tea, etc.).      4) Avoid carbonated drinks that contain phosphoric acid such as dark krystin.      5) Moderate daily calcium intake (8073-3021 mg/day).   - FUV: in 2-3 months or sooner if concerns arise. Will do 24 urine supersaturation profile test.    Patient verbalized understanding of above. She will not hesitate to contact Division of Nephrology at [phone] with concerns/questions.     I spent 45 minutes in the professional and overall care of this patient.      Jorgito Leung, APRN-CNP    " no

## 2025-04-07 ENCOUNTER — APPOINTMENT (OUTPATIENT)
Dept: PRIMARY CARE | Facility: CLINIC | Age: 70
End: 2025-04-07
Payer: COMMERCIAL

## 2025-04-07 VITALS
WEIGHT: 160 LBS | SYSTOLIC BLOOD PRESSURE: 117 MMHG | BODY MASS INDEX: 32.25 KG/M2 | DIASTOLIC BLOOD PRESSURE: 80 MMHG | HEIGHT: 59 IN | OXYGEN SATURATION: 96 % | HEART RATE: 71 BPM

## 2025-04-07 DIAGNOSIS — R53.82 CHRONIC FATIGUE: Primary | ICD-10-CM

## 2025-04-07 DIAGNOSIS — I42.2 HYPERTROPHIC CARDIOMYOPATHY (MULTI): ICD-10-CM

## 2025-04-07 DIAGNOSIS — E11.9 TYPE 2 DIABETES MELLITUS WITHOUT COMPLICATION, WITHOUT LONG-TERM CURRENT USE OF INSULIN: ICD-10-CM

## 2025-04-07 DIAGNOSIS — E03.9 HYPOTHYROIDISM, UNSPECIFIED TYPE: ICD-10-CM

## 2025-04-07 DIAGNOSIS — F41.9 ANXIETY: ICD-10-CM

## 2025-04-07 DIAGNOSIS — E78.00 PURE HYPERCHOLESTEROLEMIA: ICD-10-CM

## 2025-04-07 PROCEDURE — 1160F RVW MEDS BY RX/DR IN RCRD: CPT

## 2025-04-07 PROCEDURE — 99214 OFFICE O/P EST MOD 30 MIN: CPT

## 2025-04-07 PROCEDURE — 3008F BODY MASS INDEX DOCD: CPT

## 2025-04-07 PROCEDURE — 1036F TOBACCO NON-USER: CPT

## 2025-04-07 PROCEDURE — 3079F DIAST BP 80-89 MM HG: CPT

## 2025-04-07 PROCEDURE — 1123F ACP DISCUSS/DSCN MKR DOCD: CPT

## 2025-04-07 PROCEDURE — 1159F MED LIST DOCD IN RCRD: CPT

## 2025-04-07 PROCEDURE — 1157F ADVNC CARE PLAN IN RCRD: CPT

## 2025-04-07 PROCEDURE — 1158F ADVNC CARE PLAN TLK DOCD: CPT

## 2025-04-07 PROCEDURE — 3074F SYST BP LT 130 MM HG: CPT

## 2025-04-07 RX ORDER — LANOLIN ALCOHOL/MO/W.PET/CERES
1000 CREAM (GRAM) TOPICAL DAILY
COMMUNITY

## 2025-04-07 ASSESSMENT — PATIENT HEALTH QUESTIONNAIRE - PHQ9
SUM OF ALL RESPONSES TO PHQ9 QUESTIONS 1 AND 2: 0
1. LITTLE INTEREST OR PLEASURE IN DOING THINGS: NOT AT ALL
2. FEELING DOWN, DEPRESSED OR HOPELESS: NOT AT ALL

## 2025-04-07 ASSESSMENT — ENCOUNTER SYMPTOMS
OCCASIONAL FEELINGS OF UNSTEADINESS: 0
LOSS OF SENSATION IN FEET: 0
DEPRESSION: 0

## 2025-04-07 NOTE — PROGRESS NOTES
"Primary Care Provider: Divya Aaron, APRN-CNP    Subjective   Susan Rowan is a 69 y.o. female who presents for Follow-up (Constantly fatigue ).    HPI   PMH of DM II, HLD, Cardiomyopathy, Hypothyroidism, nephrolithiasis s/p L ESWL (12/13/2024) on potassium citrate, anxiety. Colonoscopy last 10/14/22- repeat 5 year 10/14/2027. Healthcare POA- , Juno Rowan.    FUV    Has been feeling fatigue for years now  She noticed she has been more tired since she started on baclofen - TID, pretty much take BID though    OSWALDO  On CPAP, wears all night    Anxiety  On Prozac 20mg nightly & Buspar 20mg BID    Cardiomyopathy  Stable  No SOB, no chest pain    Hypothyroidism  On Levothyroxine 75mcg  Lab Results   Component Value Date    TSH 1.19 08/09/2024     DM II  On metformin 500mg BID  Hx of being on Ozempic- could not tolerate SE  Lab Results   Component Value Date    HGBA1C 6.7 (H) 03/01/2025     BMI 32-32.9  4/7/2025: BMI 32.32, weight 160lbs  Exercises 4-5 days a week; 30mins cardio and weight lifting  Hx of being on Ozempic- could not tolerate SE  Diet: well-balanced    HLD  On rosuvastatin 40mg daily and Zetia 10mg daily  Lab Results   Component Value Date    CHOL 119 03/01/2025    CHOL 170 08/09/2024    CHOL 153 05/21/2022     Lab Results   Component Value Date    HDL 59 03/01/2025    HDL 57.6 08/09/2024    HDL 62.2 05/21/2022     Lab Results   Component Value Date    LDLCALC 46 03/01/2025    LDLCALC 84 08/09/2024     Lab Results   Component Value Date    TRIG 67 03/01/2025    TRIG 140 08/09/2024    TRIG 108 05/21/2022     No components found for: \"CHOLHDL\"        Review of Systems  The remainder of the ROS was negative unless otherwise stated in the HPI.       Objective   /80   Pulse 71   Ht 1.499 m (4' 11\")   Wt 72.6 kg (160 lb)   LMP  (LMP Unknown)   SpO2 96%   BMI 32.32 kg/m²     Physical Exam  Vitals reviewed.   Constitutional:       General: She is not in acute distress.     Appearance: " Normal appearance. She is normal weight. She is not ill-appearing, toxic-appearing or diaphoretic.   HENT:      Head: Normocephalic and atraumatic.      Nose: Nose normal.   Eyes:      Conjunctiva/sclera: Conjunctivae normal.   Cardiovascular:      Rate and Rhythm: Normal rate and regular rhythm.      Pulses: Normal pulses.      Heart sounds: Normal heart sounds. No murmur heard.     No friction rub. No gallop.   Pulmonary:      Effort: Pulmonary effort is normal. No respiratory distress.      Breath sounds: Normal breath sounds.   Abdominal:      General: Abdomen is flat. Bowel sounds are normal.      Palpations: Abdomen is soft.   Skin:     General: Skin is warm and dry.   Neurological:      General: No focal deficit present.      Mental Status: She is alert and oriented to person, place, and time. Mental status is at baseline.   Psychiatric:         Mood and Affect: Mood normal.         Behavior: Behavior normal.         Thought Content: Thought content normal.         Judgment: Judgment normal.         Assessment/Plan       PMH of DM II, HLD, Cardiomyopathy, Hypothyroidism, nephrolithiasis s/p L ESWL (12/13/2024) on potassium citrate, anxiety. Colonoscopy last 10/14/22- repeat 5 year 10/14/2027. Healthcare POA- , Juno Rowan.    FUV    Chronic fatigue  persisting  Has been feeling fatigue for years now  She noticed she has been more tired since she started on baclofen - TID, pretty much take BID though  Trial baclofen at bedtime  And add vit b12 daily    OSWALDO  stable  On CPAP, wears all night    Anxiety  stable  C/w Prozac 20mg nightly & Buspar 20mg BID    Cardiomyopathy  Stable  No SOB, no chest pain  Continue following with cardiology    Hypothyroidism  Stable  Recheck TSH level  On Levothyroxine 75mcg  Lab Results   Component Value Date    TSH 1.19 08/09/2024     DM II  stable  On metformin 500mg BID  Hx of being on Ozempic- could not tolerate SE  Lab Results   Component Value Date    HGBA1C 6.7 (H)  "03/01/2025   Blood work ordered: CMP & albumin-creatine ratio     BMI 32-32.9  Persisting   4/7/2025: BMI 32.32, weight 160lbs  Exercises 4-5 days a week; 30mins cardio and weight lifting  Hx of being on Ozempic- could not tolerate SE  Diet: well-balanced  Continue with regular exercise and healthier eating, she is going to follow up with her endocrinologist as well    HLD  stable  On rosuvastatin 40mg daily and Zetia 10mg daily  Lab Results   Component Value Date    CHOL 119 03/01/2025    CHOL 170 08/09/2024    CHOL 153 05/21/2022     Lab Results   Component Value Date    HDL 59 03/01/2025    HDL 57.6 08/09/2024    HDL 62.2 05/21/2022     Lab Results   Component Value Date    LDLCALC 46 03/01/2025    LDLCALC 84 08/09/2024     Lab Results   Component Value Date    TRIG 67 03/01/2025    TRIG 140 08/09/2024    TRIG 108 05/21/2022     No components found for: \"CHOLHDL\"      Blood work ordered      Follow up in 6 months or sooner if needed  "

## 2025-04-15 ENCOUNTER — APPOINTMENT (OUTPATIENT)
Dept: ENDOCRINOLOGY | Facility: CLINIC | Age: 70
End: 2025-04-15
Payer: COMMERCIAL

## 2025-04-15 VITALS
HEART RATE: 76 BPM | WEIGHT: 161.8 LBS | SYSTOLIC BLOOD PRESSURE: 120 MMHG | HEIGHT: 59 IN | RESPIRATION RATE: 16 BRPM | DIASTOLIC BLOOD PRESSURE: 78 MMHG | BODY MASS INDEX: 32.62 KG/M2

## 2025-04-15 DIAGNOSIS — N20.0 RECURRENT NEPHROLITHIASIS: ICD-10-CM

## 2025-04-15 DIAGNOSIS — E78.5 HYPERLIPIDEMIA, UNSPECIFIED HYPERLIPIDEMIA TYPE: ICD-10-CM

## 2025-04-15 DIAGNOSIS — E11.9 TYPE 2 DIABETES MELLITUS WITHOUT COMPLICATION, WITHOUT LONG-TERM CURRENT USE OF INSULIN: Primary | ICD-10-CM

## 2025-04-15 PROCEDURE — 1036F TOBACCO NON-USER: CPT | Performed by: INTERNAL MEDICINE

## 2025-04-15 PROCEDURE — 1160F RVW MEDS BY RX/DR IN RCRD: CPT | Performed by: INTERNAL MEDICINE

## 2025-04-15 PROCEDURE — 99213 OFFICE O/P EST LOW 20 MIN: CPT | Performed by: INTERNAL MEDICINE

## 2025-04-15 PROCEDURE — 1159F MED LIST DOCD IN RCRD: CPT | Performed by: INTERNAL MEDICINE

## 2025-04-15 PROCEDURE — 3078F DIAST BP <80 MM HG: CPT | Performed by: INTERNAL MEDICINE

## 2025-04-15 PROCEDURE — 3008F BODY MASS INDEX DOCD: CPT | Performed by: INTERNAL MEDICINE

## 2025-04-15 PROCEDURE — 1157F ADVNC CARE PLAN IN RCRD: CPT | Performed by: INTERNAL MEDICINE

## 2025-04-15 PROCEDURE — 1123F ACP DISCUSS/DSCN MKR DOCD: CPT | Performed by: INTERNAL MEDICINE

## 2025-04-15 PROCEDURE — 3074F SYST BP LT 130 MM HG: CPT | Performed by: INTERNAL MEDICINE

## 2025-04-15 RX ORDER — TIRZEPATIDE 2.5 MG/.5ML
2.5 INJECTION, SOLUTION SUBCUTANEOUS
Qty: 2 ML | Refills: 0 | Status: SHIPPED | OUTPATIENT
Start: 2025-04-15 | End: 2026-04-15

## 2025-04-15 RX ORDER — POTASSIUM CITRATE 1080 MG/1
20 TABLET, EXTENDED RELEASE ORAL
Qty: 360 TABLET | Refills: 3 | Status: SHIPPED | OUTPATIENT
Start: 2025-04-15 | End: 2026-04-15

## 2025-04-15 ASSESSMENT — ENCOUNTER SYMPTOMS
NAUSEA: 0
HEADACHES: 0
SHORTNESS OF BREATH: 0
COUGH: 0
VOMITING: 0
PALPITATIONS: 0
DIARRHEA: 0
CHILLS: 0
FEVER: 0
FATIGUE: 0

## 2025-04-15 NOTE — PROGRESS NOTES
Endocrinology: Follow up visit  Subjective   Patient ID: Susan Rowan is a 69 y.o. female who presents for Diabetes (Type 2).    PCP: JOSE Alva    HPI  Dm2  Metformin 500 bid  Ozempic intolerant  Since last visit doing fine back on metformin  Tolerating it fine  Down 5 lbs  A1c stable at 6.7  Still very frustrated by weight   is doing great  on mounjaro and would like to try it    Review of Systems   Constitutional:  Negative for chills, fatigue and fever.   Respiratory:  Negative for cough and shortness of breath.    Cardiovascular:  Negative for chest pain and palpitations.   Gastrointestinal:  Negative for diarrhea, nausea and vomiting.   Neurological:  Negative for headaches.       Patient Active Problem List   Diagnosis    Abdominal pain    Abnormal mammogram of both breasts    Anxiety    Asymmetric septal hypertrophy    Breast hematoma, resolving    Calcification of left breast on mammography    Cardiac disease    Cervical neuritis    Chronic throat clearing    Elevated coronary artery calcium score    Female pelvic pain    Hyperlipidemia    Hypothyroid    Kidney stone    Labial lesion    Liver lesion    Mechanical low back pain    Murmur    Nasal dryness    Ovarian cyst    Reflux laryngitis    Skin mole    Snoring    Urinary frequency    UTI symptoms    Vitamin D deficiency    Vocal cord polyp    Urge incontinence    Recurrent nephrolithiasis    Pelvic floor weakness    OSWALDO (obstructive sleep apnea)    Atrophic vaginitis    BMI 32.0-32.9,adult    Urge urinary incontinence    Healthcare maintenance    Advance directive discussed with patient    Type 2 diabetes mellitus without complication, without long-term current use of insulin    Chronic fatigue    Left renal stone    Hypertrophic cardiomyopathy (Multi)        Home Meds:  Current Outpatient Medications   Medication Instructions    aspirin 81 mg EC tablet 1 tablet, Nightly    baclofen (LIORESAL) 10 mg, oral, 3 times daily     busPIRone (BUSPAR) 20 mg, oral, 2 times daily    cholecalciferol (VITAMIN D-3) 25 mcg, Daily    cyanocobalamin (VITAMIN B-12) 1,000 mcg, Daily    ezetimibe (ZETIA) 10 mg, oral, Daily    FLUoxetine (PROZAC) 20 mg, oral, Daily    levothyroxine (SYNTHROID, LEVOXYL) 75 mcg, oral, Daily, Take 1 tablet daily and double on sundays    metFORMIN XR (GLUCOPHAGE-XR) 500 mg, oral, 2 times daily (morning and late afternoon), Do not crush, chew, or split.    potassium citrate CR (Urocit-K-10) 10 mEq ER tablet TAKE 1 TABLET BY MOUTH 2 TIMES A DAY WITH MEALS. DO NOT CRUSH, CHEW OR SPLIT.    rosuvastatin (CRESTOR) 40 mg, oral, Daily    solifenacin (VESICARE) 10 mg, Daily        Allergies   Allergen Reactions    Vancomycin Rash    Cephalexin Unknown and Hives    Penicillins Hives and Unknown    Ozempic [Semaglutide] Other     Myalgia, severe fatigue, vomiting    Tolterodine Unknown, GI Upset, Other and Nausea/vomiting        Objective   Vitals:    04/15/25 1048   BP: 120/78   Pulse: 76   Resp: 16      Vitals:    04/15/25 1048   Weight: 73.4 kg (161 lb 12.8 oz)      Body mass index is 32.68 kg/m².   Physical Exam  Constitutional:       Appearance: Normal appearance. She is overweight.   HENT:      Head: Normocephalic and atraumatic.   Neck:      Thyroid: No thyroid mass, thyromegaly or thyroid tenderness.   Cardiovascular:      Rate and Rhythm: Normal rate and regular rhythm.      Heart sounds: No murmur heard.     No gallop.   Pulmonary:      Effort: Pulmonary effort is normal.      Breath sounds: Normal breath sounds.   Abdominal:      Palpations: Abdomen is soft.      Comments: benign   Neurological:      General: No focal deficit present.      Mental Status: She is alert and oriented to person, place, and time.      Deep Tendon Reflexes: Reflexes are normal and symmetric.   Psychiatric:         Behavior: Behavior is cooperative.         Labs:  Lab Results   Component Value Date    HGBA1C 6.7 (H) 03/01/2025    TSH 1.19 08/09/2024  "   FREET4 1.30 05/21/2022      No results found for: \"PR1\", \"THYROIDPAB\", \"TSI\"     Assessment/Plan   Assessment & Plan  Type 2 diabetes mellitus without complication, without long-term current use of insulin    Orders:    tirzepatide (Mounjaro) 2.5 mg/0.5 mL pen injector; Inject 2.5 mg under the skin every 7 days.  discussed course  Ok to try mounjaro  Watch for gi side effects  Continue metformin  Hyperlipidemia, unspecified hyperlipidemia type    Stable on statin      Electronically signed by:  Evie Omer MD 04/15/25 10:56 AM              "

## 2025-04-15 NOTE — ASSESSMENT & PLAN NOTE
Orders:    tirzepatide (Mounjaro) 2.5 mg/0.5 mL pen injector; Inject 2.5 mg under the skin every 7 days.  discussed course  Ok to try mounjaro  Watch for gi side effects  Continue metformin

## 2025-05-02 ENCOUNTER — APPOINTMENT (OUTPATIENT)
Dept: NEPHROLOGY | Facility: CLINIC | Age: 70
End: 2025-05-02
Payer: COMMERCIAL

## 2025-05-02 VITALS
TEMPERATURE: 97.9 F | OXYGEN SATURATION: 94 % | BODY MASS INDEX: 31.51 KG/M2 | DIASTOLIC BLOOD PRESSURE: 72 MMHG | SYSTOLIC BLOOD PRESSURE: 112 MMHG | HEART RATE: 89 BPM | WEIGHT: 156 LBS

## 2025-05-02 DIAGNOSIS — F41.9 ANXIETY: ICD-10-CM

## 2025-05-02 DIAGNOSIS — N20.0 KIDNEY STONE: Primary | ICD-10-CM

## 2025-05-02 DIAGNOSIS — R82.991 HYPOCITRATURIA: ICD-10-CM

## 2025-05-02 PROCEDURE — 1157F ADVNC CARE PLAN IN RCRD: CPT | Performed by: NURSE PRACTITIONER

## 2025-05-02 PROCEDURE — 3078F DIAST BP <80 MM HG: CPT | Performed by: NURSE PRACTITIONER

## 2025-05-02 PROCEDURE — 1159F MED LIST DOCD IN RCRD: CPT | Performed by: NURSE PRACTITIONER

## 2025-05-02 PROCEDURE — 1123F ACP DISCUSS/DSCN MKR DOCD: CPT | Performed by: NURSE PRACTITIONER

## 2025-05-02 PROCEDURE — 99212 OFFICE O/P EST SF 10 MIN: CPT | Performed by: NURSE PRACTITIONER

## 2025-05-02 PROCEDURE — 3074F SYST BP LT 130 MM HG: CPT | Performed by: NURSE PRACTITIONER

## 2025-05-02 ASSESSMENT — ENCOUNTER SYMPTOMS
HEMATOLOGIC/LYMPHATIC NEGATIVE: 1
PSYCHIATRIC NEGATIVE: 1
GASTROINTESTINAL NEGATIVE: 1
RESPIRATORY NEGATIVE: 1
ENDOCRINE NEGATIVE: 1
NEUROLOGICAL NEGATIVE: 1
EYES NEGATIVE: 1
MUSCULOSKELETAL NEGATIVE: 1
CARDIOVASCULAR NEGATIVE: 1
CONSTITUTIONAL NEGATIVE: 1

## 2025-05-02 NOTE — PROGRESS NOTES
History Of Present Illness  Susan Rowan is a 69 y.o. female with medical history significant for recurrent nephrolithiasis s/p L ESWL (12/13/2024) who presents for a 2-month fuv for recurrent kidney stones.     Past Medical History  As above.    Surgical History  She has a past surgical history that includes Other surgical history (03/06/2020) and Other surgical history (03/22/2017).     Social History  She reports that she has never smoked. She has never been exposed to tobacco smoke. She has never used smokeless tobacco. She reports current alcohol use of about 2.0 standard drinks of alcohol per week. She reports that she does not use drugs.    Family History  Family History   Problem Relation Name Age of Onset    Arthritis Mother      Other (CARDIAC DISORDER) Mother      Other (CVA) Mother      Hyperlipidemia Mother      Osteoporosis Mother      Other (HOCM) Mother      Arthritis Father      Other (CARDIAC DISORDER) Father      Other (CEREBROVASCULAR ACCIDENT) Father      Hyperlipidemia Father      Other (MI) Father      Stroke Father      Hyperlipidemia Sister      Hyperlipidemia Brother      Anxiety disorder Other MAT GM     Depression Other MAT GM     Breast cancer Other MAT AUNT     Other (UTERUS CANCER) Other MAT AUNT         Allergies  Vancomycin, Cephalexin, Penicillins, Ozempic [semaglutide], and Tolterodine    Review of Systems   Constitutional: Negative.    HENT: Negative.     Eyes: Negative.    Respiratory: Negative.     Cardiovascular: Negative.    Gastrointestinal: Negative.    Endocrine: Negative.    Genitourinary:  Positive for urgency.   Musculoskeletal: Negative.    Skin: Negative.    Neurological: Negative.    Hematological: Negative.    Psychiatric/Behavioral: Negative.          Physical Exam  Vitals reviewed.   Constitutional:       Appearance: Normal appearance.   HENT:      Mouth/Throat:      Mouth: Mucous membranes are moist.   Cardiovascular:      Rate and Rhythm: Normal rate and regular  "rhythm.      Pulses: Normal pulses.      Heart sounds: Normal heart sounds.   Pulmonary:      Effort: Pulmonary effort is normal.      Breath sounds: Normal breath sounds.   Musculoskeletal:         General: Normal range of motion.   Skin:     General: Skin is warm and dry.   Neurological:      General: No focal deficit present.      Mental Status: She is alert and oriented to person, place, and time.   Psychiatric:         Mood and Affect: Mood normal.         Behavior: Behavior normal.         Thought Content: Thought content normal.         Judgment: Judgment normal.          Last Recorded Vitals  Blood pressure 112/72, pulse 89, temperature 36.6 °C (97.9 °F), temperature source Temporal, weight 70.8 kg (156 lb), SpO2 94%, not currently breastfeeding.    Relevant Results  Kidney stone urine panels (24 hr urine collection) (2/20/23):   - Urine volume: 1.53 L/24 hr.  - SS CaOx: 6.60.  - Urine Calcium: 99 mg/24 hr.  - Urine Oxalate: 32 mg/24 hr.  - Urine Citrate: 283 mg/24 hr. (Reference range: male > 450; female > 550)  - SS CaP: 0.89.  - 24 hr urine pH: 6.326.  - SS uric acid: 0.29.  - Urine uric acid: 0.404 g/24 hr.     Assessment/Plan     69 y.o. female with medical history significant for recurrent nephrolithiasis s/p L ESWL (12/13/2024) who presents for a 2-month fuv for recurrent kidney stones.    # Kidney stones: recurrent. Has had several episodes of kidney stones. s/p L ESWL (12/13/2024). Follows up with Urology regularly with annual renal imaging. Most recent visit with urology was on 3/18/25, and it is noted that \"KUB from 3/6/2025 showed complete resolution of nephrolithiasis\". Previous kidney stone urine panels (24 hr urine collection) (2/20/23) showed suboptimal urine volume (1.53 L/24 hr), marked hypocitraturia, borderline high urine pH, and mild CaOx stone risk. Patient reports no strong family history of kidney stones. Patient denies any digestive diseases and surgeries such as inflammatory bowel " "disease (IBD), ulcerative colitis, crohn’s disease, or gastric bypass surgeries. Patient denies parathyroid/thyroid surgeries. No hyperuricemia. No hyperparathyroidism. Vit  d level was WNL (3/14/25). Patient does not take calcium supplements. Patient reports that she has done dietary modifications since the first episode but she \"could do better job\".     # Hypocitraturia: per previous kidney stone urine panels (24 hr urine collection) (23). Currently on potassium citrate 20 mEq BID.    Plan:    - Continue potassium citrate.    - Reviewed dietary modifications required to help prevent recurrent stone formation includin) Daily water intake of 2.5 - 3 L.      2) Dietary modification: low animal protein (limiting intake to <8 ounces/daily); low sodium diet (< or = 2000mg/day); low oxalate diet; high fiber diet; citric acid supplementation in the form of lemon juice; avoid high doses of vitamin C supplements (>500mg); avoid drinking large amounts of orange juice.      3) Avoid high oxalate foods (dark green leafy vegetables, nuts, rhubarb, chocolate, tea, etc.).      4) Avoid carbonated drinks that contain phosphoric acid such as dark krystin.      5) Moderate daily calcium intake (5447-9641 mg/day).   - FUV: in 1 year or sooner if concerns arise. Will do 24 urine supersaturation profile test as needed.    Patient verbalized understanding of above. She will not hesitate to contact Division of Nephrology at [phone] with concerns/questions.     I spent 15 minutes in the professional and overall care of this patient.      Jorgito Leung, APRN-CNP    "

## 2025-05-05 ENCOUNTER — TELEPHONE (OUTPATIENT)
Dept: CARDIOLOGY | Facility: HOSPITAL | Age: 70
End: 2025-05-05
Payer: COMMERCIAL

## 2025-05-05 ENCOUNTER — PATIENT MESSAGE (OUTPATIENT)
Dept: PRIMARY CARE | Facility: CLINIC | Age: 70
End: 2025-05-05
Payer: COMMERCIAL

## 2025-05-05 DIAGNOSIS — F41.9 ANXIETY: ICD-10-CM

## 2025-05-05 RX ORDER — FLUOXETINE HYDROCHLORIDE 20 MG/1
20 CAPSULE ORAL DAILY
Qty: 90 CAPSULE | Refills: 3 | Status: SHIPPED | OUTPATIENT
Start: 2025-05-05

## 2025-05-05 RX ORDER — FLUOXETINE HYDROCHLORIDE 20 MG/1
20 CAPSULE ORAL DAILY
Qty: 90 CAPSULE | Refills: 0 | Status: SHIPPED | OUTPATIENT
Start: 2025-05-05 | End: 2025-05-05 | Stop reason: SDUPTHER

## 2025-05-06 NOTE — PROGRESS NOTES
[facility]   [facility]    Primary Care Physician: HARDY Alva-CNP  Primary Cardiologist:  Dr. Mp Andersen  Date of Visit: 05/08/2025  8:00 AM EDT     HPI / Summary:   Susan Rowan is a 69 y.o. female with hypertrophic cardiomyopathy who presents for follow up.    Her mother has HCM and had an alcohol septal ablation about 25 years ago.  has had an echocardiogram and a cardiac MRI which showed normal LV function and basal septal hypertrophy up to 1.5 cm.     She has been taking Tirzepatide for two weeks and has lost ~5lbs. She had a negative reaction to Ozempic, and reports that she would feel herself falling asleep and had nausea. She does not have these symptoms while on Tirzepatide, but she does have mild constipation and acid reflux symptoms.     She has exercise classes~3 times per week at lunch while at [employer]. No chest pain, SOB or syncope during these classes.    She reports that all of her siblings have been informed about the necessity of screening for HCM, and both of her siblings have been checked.     continues to takes care of her parents. Her mother has HCM. 's  had NPH. He is disabled and this places a large caregiver burden on her.    Since last visit, Holter showed no high risk features. 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    HCM History  The patient was initially diagnosed in 2022 when her echocardiogram and CMR suggested asymmetric LVH.   Family History of HCM: Mother (had an alcohol septal ablation)  NYHA Class: 1  Wall thickness: 1.5 cm, CMR 2022  EF: 65%  LVOT obstruction: No  ICD or PPM: No  Prior septal reduction therapy: No  Genetic Testing: VUS in SOS1 p.L6P  Parents: Mom has HCM and had an ASA. Also has HLD. Dad has HLD. Both have PPM  Children: Caryl (feli 1983) - unclear if heart issues, very active and exercises  often  Siblings: Lizbeth (b ) - seeing a cardiologist but supposedly no HCM; Ean (b ) - has been screened and is negative for HCM.     Sudden Cardiac Arrest Risk Factors:   No syncope  No wall thickness above 30 mm.   No apical aneurysm  No NSVT by ambulatory monitor   No EF less than 50%  No LGE by CMR  No family history of SCA in a family member with HCM  SCA risk: likely low     ROS: Relevant review of symptoms of negative unless discussed above.     Problems:   Patient Active Problem List   Diagnosis    Abdominal pain    Abnormal mammogram of both breasts    Anxiety    Asymmetric septal hypertrophy    Breast hematoma, resolving    Calcification of left breast on mammography    Cardiac disease    Cervical neuritis    Chronic throat clearing    Elevated coronary artery calcium score    Female pelvic pain    Hyperlipidemia    Hypothyroid    Kidney stone    Labial lesion    Liver lesion    Mechanical low back pain    Murmur    Nasal dryness    Ovarian cyst    Reflux laryngitis    Skin mole    Snoring    Urinary frequency    UTI symptoms    Vitamin D deficiency    Vocal cord polyp    Urge incontinence    Recurrent nephrolithiasis    Pelvic floor weakness    OSWALDO (obstructive sleep apnea)    Atrophic vaginitis    BMI 32.0-32.9,adult    Urge urinary incontinence    Healthcare maintenance    Advance directive discussed with patient    Type 2 diabetes mellitus without complication, without long-term current use of insulin    Chronic fatigue    Left renal stone    Hypertrophic cardiomyopathy (Multi)       Medical History:   Past Medical History:   Diagnosis Date    Encounter for full-term uncomplicated delivery      (spontaneous vaginal delivery)    Encounter for screening for malignant neoplasm of colon 2017    Colon cancer screening    Other conditions influencing health status 2018    History of cough    Other specified postprocedural states     History of surgery on arm    Overactive bladder   "   Pain in unspecified foot 09/19/2017    Acute foot pain    Personal history of other diseases of the circulatory system 03/06/2020    History of abnormal electrocardiography    Personal history of other diseases of the musculoskeletal system and connective tissue     History of arthritis    Personal history of other diseases of the respiratory system 05/16/2018    History of sinusitis    Personal history of other diseases of the respiratory system 05/16/2018    History of sore throat    Personal history of other specified conditions 06/13/2019    History of urinary urgency    Personal history of urinary calculi     History of renal calculi    Pure hypercholesterolemia, unspecified     High cholesterol    Sleep apnea     Varicella without complication     Varicella without complication       Surgical Hx:   Past Surgical History:   Procedure Laterality Date    OTHER SURGICAL HISTORY  03/06/2020    Elbow surgery    OTHER SURGICAL HISTORY  03/22/2017    Repair Of Humerus / Arm        Family Hx:   Family History   Problem Relation Name Age of Onset    Arthritis Mother      Other (CARDIAC DISORDER) Mother      Other (CVA) Mother      Hyperlipidemia Mother      Osteoporosis Mother      Other (HOCM) Mother      Arthritis Father      Other (CARDIAC DISORDER) Father      Other (CEREBROVASCULAR ACCIDENT) Father      Hyperlipidemia Father      Other (MI) Father      Stroke Father      Hyperlipidemia Sister      Hyperlipidemia Brother      Anxiety disorder Other MAT GM     Depression Other MAT GM     Breast cancer Other MAT AUNT     Other (UTERUS CANCER) Other MAT AUNT    Mother  -has HCM. Had an ASA. Has a \"pacemaker\"  Father  -HLD, \"PPM\"  Kids  Caryl (b 1983, tough relationship) - has two kids Gail and Kvng    Social Hx:  Fun: likes to work out. Loves going to restaurants. Likes [place], [place], [place]. She cooks 99% of the time.   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   EtOH/Smoking/Drugs: No " "smoking, rare alcohol, no drugs    Medications  Current Outpatient Medications   Medication Instructions    aspirin 81 mg EC tablet 1 tablet, Nightly    baclofen (LIORESAL) 10 mg, oral, 3 times daily    busPIRone (BUSPAR) 20 mg, oral, 2 times daily    cholecalciferol (VITAMIN D-3) 25 mcg, Daily    cyanocobalamin (VITAMIN B-12) 1,000 mcg, Daily    ezetimibe (ZETIA) 10 mg, oral, Daily    FLUoxetine (PROZAC) 20 mg, oral, Daily    levothyroxine (SYNTHROID, LEVOXYL) 75 mcg, oral, Daily, Take 1 tablet daily and double on sundays    metFORMIN XR (GLUCOPHAGE-XR) 500 mg, oral, 2 times daily (morning and late afternoon), Do not crush, chew, or split.    Mounjaro 2.5 mg, subcutaneous, Every 7 days    potassium citrate CR (Urocit-K-10) 10 mEq ER tablet 20 mEq, oral, 2 times daily (morning and late afternoon), Do not crush, chew, or split.    rosuvastatin (CRESTOR) 40 mg, oral, Daily    solifenacin (VESICARE) 10 mg, Daily       Allergies  Vancomycin, Cephalexin, Penicillins, Ozempic [semaglutide], and Tolterodine    Exam:   Vitals: /74 (BP Location: Left arm, Patient Position: Sitting, BP Cuff Size: Large adult)   Pulse 79   Ht 1.499 m (4' 11\")   Wt 70.6 kg (155 lb 11.2 oz)   LMP  (LMP Unknown)   SpO2 95%   BMI 31.45 kg/m²   Wt Readings from Last 5 Encounters:   05/08/25 70.6 kg (155 lb 11.2 oz)   05/02/25 70.8 kg (156 lb)   04/15/25 73.4 kg (161 lb 12.8 oz)   04/07/25 72.6 kg (160 lb)   03/14/25 74.4 kg (164 lb)     GEN: Pleasant, well-appearing, no acute distress.  HEENT: JVP not elevated  CHEST: Clear to auscultation, No wheeze, good air movement.  CV: normal rate, regular rhythm, no murmurs or rubs at rest, with valsalva or with squat to stand  EXT: Warm, well perfused, No LE edema.   NEURO: grossly non focal  SKIN: No obvious rashes     Labs:   Lipids  Lab Results   Component Value Date    CHOL 119 03/01/2025    CHOL 170 08/09/2024    CHOL 153 05/21/2022     Lab Results   Component Value Date    HDL 59 " "03/01/2025    HDL 57.6 08/09/2024    HDL 62.2 05/21/2022     Lab Results   Component Value Date    LDLCALC 46 03/01/2025    LDLCALC 84 08/09/2024     Lab Results   Component Value Date    TRIG 67 03/01/2025    TRIG 140 08/09/2024    TRIG 108 05/21/2022     No components found for: \"CHOLHDL\"    Hemoglobin A1C  Lab Results   Component Value Date    HGBA1C 6.7 (H) 03/01/2025       BMP  Lab Results   Component Value Date    GLUCOSE 120 (H) 12/02/2024    CALCIUM 9.4 12/02/2024     12/02/2024    K 4.0 12/02/2024    CO2 27 12/02/2024     12/02/2024    BUN 21 12/02/2024    CREATININE 0.83 12/02/2024         Notable Studies: imaging personally reviewed and summarized by me below  EKG:  -3/11/2024: Normal sinus rhythm, normal ECG, no ST or T wave changes.    Echo:  -3/18/2024: LVEF 65%, asymmetric septal hypertrophy up to 1.4 cm, normal RV size and function, no significant valvular abnormalities, normal aortic root no pericardial effusion.  No systolic anterior motion of the mitral valve or LVOT obstruction at rest or with Valsalva.  -9/9/2022: LVEF 70%, septal wall thickness is moderately increased, no LVOT obstruction, no significant systolic anterior motion of mitral valve.    Ambulatory Rhythm Monitor:   -4/25/2024: Holter showed max , min HR 57 and average HR 86. <1% PAC and PVC burden. 8 short episodes of SVT (fastest 157, longest 19 beats). 0 patient triggers.     Cardiac MRI:  -11/14/2022: LVEF 69%, maximal basal septal hypertrophy 1.5 cm, no suggestion of LVOT obstruction or HERB, no LGE.    Cardiac CT:  -2/2017: , LM 7, LAD 8, Lcx 20,     Assessment and Plan  Susan Rowan is a 69 y.o. female with a family history of hypertrophic cardiomyopathy in her mother who presents for HCM evaluation.    Overall,  has a maximal wall thickness of 1.5 cm with basal septal predominance.  As such, given her family history, she has a phenotypically mild form of non obstructive hypertrophic " cardiomyopathy.  We discussed this diagnosis explicitly.     #Symptoms: Fortunately she essentially has no symptoms from HCM.  She has unlimited exercise capacity and is NYHA class I.  On exam she has no suggestion of obstruction at rest, with valsalva or with [id number] cardiac auscultation.    #Family Screening: Mother has HCM and had an alcohol septal ablation in her 60s-70s. Supposedly her siblings have been screened and do not have HCM.  She has a VUS in SOS1. We recommended that all of her first-degree relatives be screened for HCM explicitly.  She has a daughter Caryl who is approximately 40 years old.     #SCA risk:  Holter in 2024 without high risk features. She does not have any other high risk features for sudden cardiac death from hypertrophic cardiomyopathy.  With the currently available information, she is likely low risk for sudden cardiac death.  -We will do a Holter monitor today for 7 days to assess for subclinical ventricular arrhythmias or undetected atrial fibrillation.   -Will repeat TTE next year, can switch to every other year if she continues to be asymptomatic    Follow up in 1 year for HCM re-evaluation. She will follow up with Dr. Andersen longitudinally.     Curt Rodriguez MD  Director, Sports Cardiology  [facility]    Part of this note was completed using dictation and voice recognition software. Please excuse minor errors and typos.

## 2025-05-07 ENCOUNTER — APPOINTMENT (OUTPATIENT)
Dept: OBSTETRICS AND GYNECOLOGY | Facility: CLINIC | Age: 70
End: 2025-05-07
Payer: COMMERCIAL

## 2025-05-08 ENCOUNTER — OFFICE VISIT (OUTPATIENT)
Dept: CARDIOLOGY | Facility: CLINIC | Age: 70
End: 2025-05-08
Payer: COMMERCIAL

## 2025-05-08 ENCOUNTER — HOSPITAL ENCOUNTER (OUTPATIENT)
Dept: CARDIOLOGY | Facility: CLINIC | Age: 70
Discharge: HOME | End: 2025-05-08
Payer: COMMERCIAL

## 2025-05-08 VITALS
HEIGHT: 59 IN | OXYGEN SATURATION: 95 % | WEIGHT: 155.7 LBS | HEART RATE: 79 BPM | BODY MASS INDEX: 31.39 KG/M2 | DIASTOLIC BLOOD PRESSURE: 74 MMHG | SYSTOLIC BLOOD PRESSURE: 109 MMHG

## 2025-05-08 DIAGNOSIS — I42.2 HYPERTROPHIC CARDIOMYOPATHY (MULTI): ICD-10-CM

## 2025-05-08 DIAGNOSIS — I42.2 HYPERTROPHIC CARDIOMYOPATHY (MULTI): Primary | ICD-10-CM

## 2025-05-08 LAB — BODY SURFACE AREA: 1.71 M2

## 2025-05-08 PROCEDURE — 3008F BODY MASS INDEX DOCD: CPT | Performed by: INTERNAL MEDICINE

## 2025-05-08 PROCEDURE — 99212 OFFICE O/P EST SF 10 MIN: CPT | Performed by: INTERNAL MEDICINE

## 2025-05-08 PROCEDURE — 1126F AMNT PAIN NOTED NONE PRSNT: CPT | Performed by: INTERNAL MEDICINE

## 2025-05-08 PROCEDURE — 1036F TOBACCO NON-USER: CPT | Performed by: INTERNAL MEDICINE

## 2025-05-08 PROCEDURE — 1159F MED LIST DOCD IN RCRD: CPT | Performed by: INTERNAL MEDICINE

## 2025-05-08 PROCEDURE — 99214 OFFICE O/P EST MOD 30 MIN: CPT | Performed by: INTERNAL MEDICINE

## 2025-05-08 PROCEDURE — 3078F DIAST BP <80 MM HG: CPT | Performed by: INTERNAL MEDICINE

## 2025-05-08 PROCEDURE — 93242 EXT ECG>48HR<7D RECORDING: CPT

## 2025-05-08 PROCEDURE — 3074F SYST BP LT 130 MM HG: CPT | Performed by: INTERNAL MEDICINE

## 2025-05-08 ASSESSMENT — PATIENT HEALTH QUESTIONNAIRE - PHQ9
SUM OF ALL RESPONSES TO PHQ9 QUESTIONS 1 AND 2: 0
2. FEELING DOWN, DEPRESSED OR HOPELESS: NOT AT ALL
1. LITTLE INTEREST OR PLEASURE IN DOING THINGS: NOT AT ALL

## 2025-05-08 ASSESSMENT — ENCOUNTER SYMPTOMS
DEPRESSION: 0
OCCASIONAL FEELINGS OF UNSTEADINESS: 0
LOSS OF SENSATION IN FEET: 0

## 2025-05-08 ASSESSMENT — PAIN SCALES - GENERAL: PAINLEVEL_OUTOF10: 0-NO PAIN

## 2025-05-09 DIAGNOSIS — N39.41 URGE INCONTINENCE: Primary | ICD-10-CM

## 2025-05-09 LAB
ALBUMIN SERPL-MCNC: 4.2 G/DL (ref 3.6–5.1)
ALP SERPL-CCNC: 56 U/L (ref 37–153)
ALT SERPL-CCNC: 33 U/L (ref 6–29)
ANION GAP SERPL CALCULATED.4IONS-SCNC: 6 MMOL/L (CALC) (ref 7–17)
AST SERPL-CCNC: 27 U/L (ref 10–35)
BILIRUB SERPL-MCNC: 0.7 MG/DL (ref 0.2–1.2)
BUN SERPL-MCNC: 16 MG/DL (ref 7–25)
CALCIUM SERPL-MCNC: 9.9 MG/DL (ref 8.6–10.4)
CHLORIDE SERPL-SCNC: 104 MMOL/L (ref 98–110)
CO2 SERPL-SCNC: 30 MMOL/L (ref 20–32)
CREAT SERPL-MCNC: 0.7 MG/DL (ref 0.5–1.05)
EGFRCR SERPLBLD CKD-EPI 2021: 94 ML/MIN/1.73M2
GLUCOSE SERPL-MCNC: 81 MG/DL (ref 65–99)
POTASSIUM SERPL-SCNC: 4.9 MMOL/L (ref 3.5–5.3)
PROT SERPL-MCNC: 6.1 G/DL (ref 6.1–8.1)
SODIUM SERPL-SCNC: 140 MMOL/L (ref 135–146)
TSH SERPL-ACNC: 3.4 MIU/L (ref 0.4–4.5)

## 2025-05-09 RX ORDER — SOLIFENACIN SUCCINATE 10 MG/1
10 TABLET, FILM COATED ORAL DAILY
Qty: 90 TABLET | Refills: 0 | Status: SHIPPED | OUTPATIENT
Start: 2025-05-09

## 2025-05-12 ENCOUNTER — PATIENT MESSAGE (OUTPATIENT)
Dept: PRIMARY CARE | Facility: CLINIC | Age: 70
End: 2025-05-12
Payer: COMMERCIAL

## 2025-05-12 DIAGNOSIS — E11.9 TYPE 2 DIABETES MELLITUS WITHOUT COMPLICATION, WITHOUT LONG-TERM CURRENT USE OF INSULIN: ICD-10-CM

## 2025-05-12 RX ORDER — TIRZEPATIDE 2.5 MG/.5ML
2.5 INJECTION, SOLUTION SUBCUTANEOUS
Qty: 2 ML | Refills: 0 | Status: SHIPPED
Start: 2025-05-12 | End: 2025-05-12

## 2025-05-12 RX ORDER — TIRZEPATIDE 2.5 MG/.5ML
2.5 INJECTION, SOLUTION SUBCUTANEOUS
Qty: 2 ML | Refills: 0 | Status: SHIPPED | OUTPATIENT
Start: 2025-05-12 | End: 2026-05-12

## 2025-05-14 DIAGNOSIS — E11.9 TYPE 2 DIABETES MELLITUS WITHOUT COMPLICATION, WITHOUT LONG-TERM CURRENT USE OF INSULIN: Primary | ICD-10-CM

## 2025-05-14 RX ORDER — TIRZEPATIDE 5 MG/.5ML
5 INJECTION, SOLUTION SUBCUTANEOUS
Qty: 2 ML | Refills: 0 | Status: SHIPPED | OUTPATIENT
Start: 2025-05-14

## 2025-05-16 DIAGNOSIS — R74.01 ELEVATED ALT MEASUREMENT: Primary | ICD-10-CM

## 2025-05-27 LAB — BODY SURFACE AREA: 1.71 M2

## 2025-05-28 ENCOUNTER — TELEPHONE (OUTPATIENT)
Dept: CARDIOLOGY | Facility: HOSPITAL | Age: 70
End: 2025-05-28
Payer: COMMERCIAL

## 2025-05-28 NOTE — TELEPHONE ENCOUNTER
Left VM for patient informing her that holter did not reveal any concerning arrhythmias requiring immediate attention. No atrial fibrillation.  Patient should continue same medications.   Dr Rodriguez will review further upon his return. Informed we will phone her back if any additional recommendations are made.

## 2025-05-30 DIAGNOSIS — R74.01 ELEVATED ALT MEASUREMENT: ICD-10-CM

## 2025-06-11 DIAGNOSIS — E11.9 TYPE 2 DIABETES MELLITUS WITHOUT COMPLICATION, WITHOUT LONG-TERM CURRENT USE OF INSULIN: Primary | ICD-10-CM

## 2025-06-11 RX ORDER — TIRZEPATIDE 7.5 MG/.5ML
7.5 INJECTION, SOLUTION SUBCUTANEOUS
Qty: 2 ML | Refills: 0 | Status: SHIPPED | OUTPATIENT
Start: 2025-06-11

## 2025-06-20 DIAGNOSIS — G47.33 OSA (OBSTRUCTIVE SLEEP APNEA): Primary | ICD-10-CM

## 2025-06-20 LAB
ALBUMIN SERPL-MCNC: 4.2 G/DL (ref 3.6–5.1)
ALP SERPL-CCNC: 50 U/L (ref 37–153)
ALT SERPL-CCNC: 18 U/L (ref 6–29)
ANION GAP SERPL CALCULATED.4IONS-SCNC: 8 MMOL/L (CALC) (ref 7–17)
AST SERPL-CCNC: 16 U/L (ref 10–35)
BILIRUB SERPL-MCNC: 0.7 MG/DL (ref 0.2–1.2)
BUN SERPL-MCNC: 14 MG/DL (ref 7–25)
CALCIUM SERPL-MCNC: 9.5 MG/DL (ref 8.6–10.4)
CHLORIDE SERPL-SCNC: 106 MMOL/L (ref 98–110)
CO2 SERPL-SCNC: 28 MMOL/L (ref 20–32)
CREAT SERPL-MCNC: 0.8 MG/DL (ref 0.5–1.05)
EGFRCR SERPLBLD CKD-EPI 2021: 80 ML/MIN/1.73M2
GLUCOSE SERPL-MCNC: 118 MG/DL (ref 65–99)
POTASSIUM SERPL-SCNC: 4.8 MMOL/L (ref 3.5–5.3)
PROT SERPL-MCNC: 5.9 G/DL (ref 6.1–8.1)
SODIUM SERPL-SCNC: 142 MMOL/L (ref 135–146)

## 2025-06-23 ENCOUNTER — HOSPITAL ENCOUNTER (OUTPATIENT)
Dept: RADIOLOGY | Facility: CLINIC | Age: 70
End: 2025-06-23
Payer: COMMERCIAL

## 2025-06-30 ENCOUNTER — HOSPITAL ENCOUNTER (OUTPATIENT)
Dept: RADIOLOGY | Facility: CLINIC | Age: 70
Discharge: HOME | End: 2025-06-30
Payer: COMMERCIAL

## 2025-06-30 VITALS — WEIGHT: 145 LBS | BODY MASS INDEX: 29.23 KG/M2 | HEIGHT: 59 IN

## 2025-06-30 DIAGNOSIS — Z12.31 ENCOUNTER FOR SCREENING MAMMOGRAM FOR MALIGNANT NEOPLASM OF BREAST: ICD-10-CM

## 2025-06-30 PROCEDURE — 77063 BREAST TOMOSYNTHESIS BI: CPT

## 2025-06-30 PROCEDURE — 77067 SCR MAMMO BI INCL CAD: CPT | Performed by: STUDENT IN AN ORGANIZED HEALTH CARE EDUCATION/TRAINING PROGRAM

## 2025-06-30 PROCEDURE — 77063 BREAST TOMOSYNTHESIS BI: CPT | Performed by: STUDENT IN AN ORGANIZED HEALTH CARE EDUCATION/TRAINING PROGRAM

## 2025-07-01 ENCOUNTER — PATIENT MESSAGE (OUTPATIENT)
Dept: PRIMARY CARE | Facility: CLINIC | Age: 70
End: 2025-07-01
Payer: COMMERCIAL

## 2025-07-01 DIAGNOSIS — N39.41 URGE INCONTINENCE: ICD-10-CM

## 2025-07-01 DIAGNOSIS — E11.9 TYPE 2 DIABETES MELLITUS WITHOUT COMPLICATION, WITHOUT LONG-TERM CURRENT USE OF INSULIN: ICD-10-CM

## 2025-07-01 RX ORDER — TIRZEPATIDE 7.5 MG/.5ML
7.5 INJECTION, SOLUTION SUBCUTANEOUS
Qty: 2 ML | Refills: 0 | Status: SHIPPED | OUTPATIENT
Start: 2025-07-01

## 2025-07-01 RX ORDER — SOLIFENACIN SUCCINATE 10 MG/1
10 TABLET, FILM COATED ORAL DAILY
Qty: 90 TABLET | Refills: 0 | Status: SHIPPED | OUTPATIENT
Start: 2025-07-01

## 2025-07-02 ENCOUNTER — APPOINTMENT (OUTPATIENT)
Dept: OBSTETRICS AND GYNECOLOGY | Facility: CLINIC | Age: 70
End: 2025-07-02
Payer: COMMERCIAL

## 2025-07-10 NOTE — ADDENDUM NOTE
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]     Procedures  Laparoscopic Sacrocolpopexy  14557 - MD LAPAROSCOPY COLPOPEXY SUSPENSION VAGINAL APEX    Posterior Colporrhaphy  96398 - MD POST COLPORRHAPHY RECTOCELE W/WO PERINEORRHAPHY    Cystoscopy  [id number] - MD CYSTOURETHROSCOPY    Extensive lysis of adhesions    Surgeons      * Tri Condon - Primary    Resident/Fellow/Other Assistant:  Surgeons and Role:  * No surgeons found with a matching role *  Janey Lai, PGY5  Gwen Starks, PGY2  Staff:   Circulator: Deanna  Scrub Person: Laura  Scrub Person: Patricia  Circulator: Bonnie  Relief Scrub: Kim  Relief Circulator: Nelly    Anesthesia Staff: Anesthesiologist: Ajay Aponte MD  CRNA: STEVENSON Henderson-CRNA  C-AA: JOYCELYN Alan  Frontline Breaker: JOYCELYN Pitts    Procedure Summary  Anesthesia: General  ASA: II  Estimated Blood Loss: 25mL  Intra-op Medications:   Administrations occurring from 0715 to 1045 on 07/10/25:   Medication Name Total Dose   sodium chloride 0.9 % irrigation solution 1,000 mL   vasopressin (Vasostrict) injection 1.85 mL   BUPivacaine HCl (Marcaine) 0.25 % (2.5 mg/mL) injection 9 mL   ceFAZolin (Ancef) vial 1 g 2 g   dexAMETHasone (Decadron) 4 mg/mL IV Syringe 2 mL 8 mg   fentaNYL (Sublimaze) injection 50 mcg/mL 100 mcg   lactated Ringer's infusion Cannot be calculated   lidocaine PF (Xylocaine-MPF) local injection 2 % 60 mg   lubricating eye drops ophthalmic solution 2 drop   midazolam PF (Versed) injection 1 mg/mL 2 mg   phenylephrine 100 mcg/mL syringe 10 mL (prefilled) 100 mcg   propofol (Diprivan) injection 10 mg/mL 180 mg   rocuronium (ZeMuron) 50 mg/5 mL injection 90 mg              Anesthesia Record               Intraprocedure I/O Totals          Intake     lactated Ringer's 1250.00 mL    Total Intake 1250 mL       Output    Urine 300 mL    Est. Blood Loss 25 mL    Total Output 325 mL       Net    Net Volume 925 mL          Specimen: No specimens collected              Procedure Details:  MRN: [id number]  Patient Name: Nargis Hay  YOB: 1961  Date: 7/10/2025     Surgeons      * Tri Condon - Primary    Resident/Fellow/Other Assistant:  Janey Lai, PGY5  Gwen Starks, PGY2     PREOPERATIVE DIAGNOSIS: Stage 2 Cystocele     PROCEDURE: Laparoscopic sacrocolpopexy, posterior repair PERINEORRHAPHY, cystoscopy     ANESTHESIA: general     EBL: 25 ml.      SPECIMEN: None.      DRAINS:  Perera catheter    VAGINAL PACKING? no    COMPLICATIONS:   Vaginal colpotomy, repaired, see below    FINDINGS: Vaginal apex -5 with strain. prolapse was anterior predominant + 1. Vaginal tissues were mildly atrophic.  At the end of the surgery, vaginal length was at least 8 cm and all compartments were well-supported. With subsequent posterior repair and PERINEORRHAPHY. Cystoscopy revealed normal bladder mucosa without evidence of trauma and bilateral ureteral efflux. Pelvic exam showed normal liver, normal gallbladder, normal ovaries bilaterally, and fallopian tubes present bilaterally.   Dense and filmy adhesions of the sigmoid colon and small bowel were encountered at the vaginal cuff, bladder and posterior cul-de sac. Significant adhesiolysis required to be able to identify the vagina and sacral promontory.     CONDITION: Stable     INDICATIONS FOR PROCEDURE: 64 y.o.  with stage 2 anterior prolapse. History notable for prior hysterectomy and MUS. She desired surgical management of this.     PROCEDURE: A time out was taken verifying patient name, position, and procedure to be performed.   General anesthesia was administered.  The patient was prepped and draped in the normal sterile fashion in yellow fin stirrups.      A Veress needle was placed through an incision in  the umbilicus.  Pneumoperitoneum was established with CO2 gas.  An 5mm port was placed through an umbilical incision and intraperitoneal placement was confirmed.  Under direct visualization, a 12mm port was placed in the right lower quadrant, and two 5mm accessory ports were placed to the left  of the umbilicus.     Dense and filmy Adhesions from the sigmoid colon and small bowel were noted and extensive adhesiolysis with traction, cold scissors and monopolor was needed to identify the vagina and provide good visualizaiton of the sacral promontory. This took greater than 30 minutes.     Once free of adhesions,The sigmoid colon was retracted laterally using an endoloop around an epiploica to identify the sacral promontory, iliac vessels and right ureter.      The left and right tubes and ovaries were normal appearing and left in situ per patient's wishes according to preoperative discussion.     The peritoneum over the sacrum was opened with cautery and the dissection was carried to the anterior longitudinal ligament.     The bladder and rectum and their relationships to the apex of the vagina were identified. The bladder and its peritoneal reflections were dissected free of the anterior vaginal wall and the peritoneum of the posterior vaginal wall was also dissected free. There were dense adhesions between the vaginal apex and the bladder and extensive careful dissection was required in this area to avoid a cystotomy. During this dissection, a 1 cm colpotomy was made in the vagina. This was repaired with 2-0 stratfix. The bladder was also backfilled and then subsequently drained during the section to ascertain clear borders between the bladder and anterior wall of the vagina. The Vertessa lite Y-mesh was secured to the anterior vaginal wall with 6 sutures of 0-PDS. 6 of the same suture sutures were placed to secure the mesh to the posterior vaginal wall.  The vagina was then elevated.  Two Goretex sutures were  placed through the anterior longitudinal ligament and mesh without complications after the tail of the mesh was trimmed so there was not any excess. These were then tied down to the sacrum with good elevation of the vaginal vault. Cystoscopy was done with good efflux from both ureters and no sutures seen.  The abdomen was inspected, and hemostasis was assured -- monopolar cautery was used on a small vessel over the anterior longitudinal ligament.  The peritoneum over the mesh was reapproximated with stratafix in running fashion.      Vaginal examination revealed well-supported anterior compartment with no need for concomitant anterior repair, but decision was made for posterior repair for additional support. Cystoscopy was done, confirming normal bladder without evidence of injury and efflux of urine from each UO was seen.     Using the [employer] needle  and an endoclose needle, the 12mm port was closed with 0 Vicryl suture.The ports were removed under direct vision.    The skin was closed with 4-0 Monocryl interrupted sutures and steri strips.    Attention was then turned to the posterior repair/perineorrhaphy. This was performed by excising a triangular-shaped wedge of posterior vaginal wall, after infiltration of dilute Vasopressin and marcaine. The rectovaginal fascia was reapproximated with four interrupted 2-0 PDS sutures and the bulbocavernosus muscles were reapproximated with a 2-0 PDS suture. The vaginal epithelium was reapproximated with a 2-0 Vicryl in a running fashion. She had adequate vaginal girth without stricture at the end of the procedure. Rectal exam confirmed no injury or suture present.       All sponge, lap and needle counts were correct at the end of the case. The patient tolerated the procedure well and was taken to recovery in stable condition.         Complications:  None; patient tolerated the procedure well.     Disposition: PACU - hemodynamically stable.  Condition:  stable    I was present for the entirety of the procedure(s).     Tri Condon MD MPH

## 2025-07-30 DIAGNOSIS — E03.9 HYPOTHYROIDISM, UNSPECIFIED TYPE: ICD-10-CM

## 2025-08-01 RX ORDER — LEVOTHYROXINE SODIUM 75 UG/1
TABLET ORAL
Qty: 90 TABLET | Refills: 3 | Status: SHIPPED | OUTPATIENT
Start: 2025-08-01

## 2025-08-04 DIAGNOSIS — N39.41 URGE INCONTINENCE: ICD-10-CM

## 2025-08-04 RX ORDER — SOLIFENACIN SUCCINATE 10 MG/1
TABLET, FILM COATED ORAL
Qty: 30 TABLET | Refills: 0 | Status: SHIPPED | OUTPATIENT
Start: 2025-08-04

## 2025-08-05 NOTE — PROGRESS NOTES
Referred by: Self     PCP  HARDY Alva-JASON         CHIEF COMPLAINT:  OAB/UUI         HISTORY OF PRESENT ILLNESS:  This is a  69 y.o. y.o. female who presents with history of OAB. Had significant weight loss recently with mounjaro and noted improvement. Solifenacin had been helping but does get dry mouth.     The following were reviewed to gain additional history:  External notes:   Dr. [facility] note 2/15/24, notes previously referred to Dr. Grey and was evaluated with UDS, recommended interstim  Dr. Grey note 2/8/24: h/o UUI, recurrent nephrolithiasis, vaginal atrophy, s/p botox 100U 1/11/24  Test results:   UDS (Dr. Grey) 2/6/24, bladder hypersensitivity with detrusor overactivity and what appears to be cough induced detrusor overactivity          Specifically, she describes the following pelvic floor symptoms:          Prolapse: No       - Splinting to urinate: No       - Splinting for bowel movement/stool trapping: No              Incontinence:  Yes             Urge  GIOVANNI subsided since weight loss              Urinary Symptoms:       - Frequency:  No             # Voids:         - Nocturia: Yes             # Voids:  1-2 times       - Urgency:  No       - Incomplete emptying:  No       - Hesitancy:  No       - Pain with voiding:  No       - Excessive fluid intake:                History:       - Recurrent UTI:  No       - Hematuria:  No       - Stones:  Yes - follows with Dr. Velasquez, had lithotripsy, and taking potassium citrate       - Kidney Disease:  No                  Bowel Symptoms:       - Regular: Yes       - Diarrhea:No       - Constipation: Yes, manages with miralax       - Fecal Incontinence:  No       - Flatus Incontinence:  No       - Fecal urgency:   No    Past medical and surgical hx reviewed - pertinent for   PMH hypertrophic cardiomyopathy, arthritis, high cholesterol, sleep apnea on CPAP  PSH no prior abdominal surgeries, lithotripsy as above  Smoking history:  denies        Gyn History:  - Postmenopause: Yes           Postmenopausal bleeding: No  - Pap up to date: Yes - , negative  - Sexually active:  No, partner issues  Dyspareunia: No   Other issues:   - Number of prior vaginal deliveries: 1   Number of prior operative deliveries:    Prior OASI?   - Number of prior c-sections: 0    - Colonoscopy up to date: Yes    OB History          1    Para   1    Term   1            AB        Living   1         SAB        IAB        Ectopic        Multiple        Live Births   1                       PHYSICAL EXAMINATION:  No LMP recorded (lmp unknown). Patient is postmenopausal.  Body mass index is 27.94 kg/m².  /78   Pulse 106   Wt 62.8 kg (138 lb 6.4 oz)   LMP  (LMP Unknown)   BMI 27.94 kg/m²   General Appearance: well appearing  Neuro: Alert and oriented   HEENT: mucous membranes moist, neck supple  Resp: No respiratory distress, normal work of breathing  MSK: normal range of motion, gait appropriate    Pelvic:  Genitourinary: normal external genitalia, Bartholin's glands negative, Olivia's glands negative  Urethra: normal meatus, non-tender, no periurethral mass  Vaginal mucosa  normal  Cervix  normal  Uterus  normal size, non-tender, mobile  Adnexae  negative nontender, no masses  Atrophy negative    CST negative    POP-Q (in supine position):  No significant prolapse    Rectal: no hemorrhoids, fissures or masses    PVR (by Ultrasound):          IMPRESSION AND PLAN:  Susan Rowan is a 69 y.o. who presents with OAB/UUI    OAB/UUI  - s/p UDS with Dr. Grey as above, +DO, hypersensitivity  - previously tried:  oxybutynin (improved bladder symptoms, but dry mouth), trospium (improvement, but still leakage), some improvement with solifenacin  - gemtesa and myrbetriq were cost-prohibitive  - s/p botox 100U 24 without significant improvement (of note patient states she did not try botox)  - symptoms significantly improved with weight loss,  congratulated on these efforts  - advised can trial discontinuing solifenacin for 1 week and determine if bladder symptoms recur, if not, ok to discontinue  - if she has recurrence of OAB/UUI advised her to reach out and will place pharmacy consult for gemtesa cost assistance given she has experienced dry mouth with solifenacin    All questions and concerns were answered and addressed.  The patient expressed understanding and agrees with the plan.     RTC 1 year    8/6/2025

## 2025-08-06 ENCOUNTER — OFFICE VISIT (OUTPATIENT)
Dept: OBSTETRICS AND GYNECOLOGY | Facility: CLINIC | Age: 70
End: 2025-08-06
Payer: COMMERCIAL

## 2025-08-06 VITALS
WEIGHT: 138.4 LBS | BODY MASS INDEX: 27.94 KG/M2 | DIASTOLIC BLOOD PRESSURE: 78 MMHG | HEART RATE: 106 BPM | SYSTOLIC BLOOD PRESSURE: 132 MMHG

## 2025-08-06 DIAGNOSIS — N32.81 OAB (OVERACTIVE BLADDER): Primary | ICD-10-CM

## 2025-08-06 DIAGNOSIS — E11.9 TYPE 2 DIABETES MELLITUS WITHOUT COMPLICATION, WITHOUT LONG-TERM CURRENT USE OF INSULIN: Primary | ICD-10-CM

## 2025-08-06 PROCEDURE — 99204 OFFICE O/P NEW MOD 45 MIN: CPT | Performed by: STUDENT IN AN ORGANIZED HEALTH CARE EDUCATION/TRAINING PROGRAM

## 2025-08-06 PROCEDURE — 1159F MED LIST DOCD IN RCRD: CPT | Performed by: STUDENT IN AN ORGANIZED HEALTH CARE EDUCATION/TRAINING PROGRAM

## 2025-08-06 PROCEDURE — 3075F SYST BP GE 130 - 139MM HG: CPT | Performed by: STUDENT IN AN ORGANIZED HEALTH CARE EDUCATION/TRAINING PROGRAM

## 2025-08-06 PROCEDURE — 99214 OFFICE O/P EST MOD 30 MIN: CPT | Performed by: STUDENT IN AN ORGANIZED HEALTH CARE EDUCATION/TRAINING PROGRAM

## 2025-08-06 PROCEDURE — 3078F DIAST BP <80 MM HG: CPT | Performed by: STUDENT IN AN ORGANIZED HEALTH CARE EDUCATION/TRAINING PROGRAM

## 2025-08-06 RX ORDER — TIRZEPATIDE 10 MG/.5ML
10 INJECTION, SOLUTION SUBCUTANEOUS
Qty: 2 ML | Refills: 0 | Status: SHIPPED | OUTPATIENT
Start: 2025-08-10 | End: 2026-08-10

## 2025-09-03 DIAGNOSIS — E11.9 TYPE 2 DIABETES MELLITUS WITHOUT COMPLICATION, WITHOUT LONG-TERM CURRENT USE OF INSULIN: ICD-10-CM

## 2025-09-03 RX ORDER — TIRZEPATIDE 10 MG/.5ML
10 INJECTION, SOLUTION SUBCUTANEOUS
Qty: 6 ML | Refills: 1 | Status: SHIPPED | OUTPATIENT
Start: 2025-09-07 | End: 2026-09-07

## 2025-09-05 ENCOUNTER — CLINICAL SUPPORT (OUTPATIENT)
Dept: SLEEP MEDICINE | Facility: CLINIC | Age: 70
End: 2025-09-05
Payer: COMMERCIAL

## 2025-09-05 DIAGNOSIS — G47.33 OSA (OBSTRUCTIVE SLEEP APNEA): ICD-10-CM

## 2025-09-11 ENCOUNTER — APPOINTMENT (OUTPATIENT)
Dept: ENDOCRINOLOGY | Facility: CLINIC | Age: 70
End: 2025-09-11
Payer: COMMERCIAL

## 2025-10-13 ENCOUNTER — APPOINTMENT (OUTPATIENT)
Dept: SLEEP MEDICINE | Facility: CLINIC | Age: 70
End: 2025-10-13
Payer: COMMERCIAL

## 2025-10-24 ENCOUNTER — APPOINTMENT (OUTPATIENT)
Dept: SLEEP MEDICINE | Facility: CLINIC | Age: 70
End: 2025-10-24
Payer: COMMERCIAL

## 2026-01-07 ENCOUNTER — APPOINTMENT (OUTPATIENT)
Facility: CLINIC | Age: 71
End: 2026-01-07
Payer: COMMERCIAL

## 2026-01-19 ENCOUNTER — APPOINTMENT (OUTPATIENT)
Dept: OBSTETRICS AND GYNECOLOGY | Facility: CLINIC | Age: 71
End: 2026-01-19
Payer: COMMERCIAL

## 2026-03-09 ENCOUNTER — APPOINTMENT (OUTPATIENT)
Dept: CARDIOLOGY | Facility: CLINIC | Age: 71
End: 2026-03-09
Payer: COMMERCIAL

## 2026-03-20 ENCOUNTER — APPOINTMENT (OUTPATIENT)
Dept: UROLOGY | Facility: CLINIC | Age: 71
End: 2026-03-20
Payer: COMMERCIAL

## (undated) DEVICE — MASK, AURASTRAIGHT, LARYN, SIZE 3

## (undated) DEVICE — BITE BLOCK, SOFT, MOUTH, 3/4 X 4, LARGE, WHITE

## (undated) DEVICE — MASK, AURASTRAIGHT, LARYN, SIZE 5

## (undated) DEVICE — MASK, AURAGAIN, LARYN, SIZE 4.0